# Patient Record
Sex: FEMALE | Race: WHITE | NOT HISPANIC OR LATINO | Employment: FULL TIME | URBAN - METROPOLITAN AREA
[De-identification: names, ages, dates, MRNs, and addresses within clinical notes are randomized per-mention and may not be internally consistent; named-entity substitution may affect disease eponyms.]

---

## 2020-10-05 ENCOUNTER — DOCUMENTATION ONLY (OUTPATIENT)
Dept: GYNECOLOGIC ONCOLOGY | Facility: CLINIC | Age: 55
End: 2020-10-05

## 2020-10-05 NOTE — PROGRESS NOTES
"55 yr old para referred from Dr. Stearns at [facility] for a recent diagnosis of at least a IIIb rectal carcinoma and subsequent finding of a rectovaginal mass with VB that has resolved.    Per Dr. Stearns's note of 10/2/2020, "patient has been having occasional constipation and associated rectal pain/fullness over the last few years. Screening colonoscopy 8/28/2020 revealed a single sessile 1.4 cm nonbleeding polyp in the mid rectum at 8 cm from the anus. There was an infiltrative, ulcerated and fungating mass at the distal rectum, measuring 3 cm, causing partial obstruction. Polyp showed tubular adenoma with focal HG dysplasia. Distal rectal mass biopsy c/w adenocarcinoma with ulceration".     MMR normal/proficient.    "Endorses mild vaginal spotting about 2 weeks ago which progressed to vaginal bleeding x 4-5 days. Not post coital and she is ."    CEA is 31.3    9/15/2020 CT C/A/P  4mm R lung nodule and additional region of somewhat nodular atelectasis in the LLL up to 5mm.  2 cm ill-defined region in posterior R lobe of liver present on prior CT imaging 2017. 3.5 cm regional fatty sparing in posteroinferior most aspect of the R lobe of liver. No significant lymph node enlargement in upper abdomen. Few stable lymph nodes in central mesentery unchanged from 2017.  Impression, no CT evidence for metastatic disease.    9/29/2020 MRI pelvis  Low stenotic, partially circumferential rectal carcinoma measuring 3.6 cm in length with max thickness 1.7 cm. Distal portion of the tumor from the anal verge measured approx 3.2 cm with partial involvement of the muscularis propria. No evidence of fat plane separation within the posterior wall of the vagina. Largest R presacral node measuring approx 1 x 0.7 cm, another 7 x 5 mm, another 8 x 5 mm.  Uterus 6 x 2.9 x 5.6 cm with 2.1 cm fibroid.  In the posterior wall of the vagina is a mass measuring approx 4.8 x 2.2 x 3.2 cm without connection with the rectal mass. Differential " includes vaginal leiomyoma but cannot rule out vaginal carcinoma or LMS.    She is referred today for evaluation and possible biopsy of rectovaginal mass. Referring provider plans for chemo RT and she has been referred to Rad Onc (Dr. Martino).    Medical comorbidities include DM, HTN, hyperthyroidism and sleep apnea (CPAP). BMI 34    No prior pelvic or abdominal surgeries.    Family history for father - prostate cancer (dec). Mother with breast cancer (44, dec). MGM and PGM also with unknown cancer.

## 2020-10-06 ENCOUNTER — OFFICE VISIT (OUTPATIENT)
Dept: GYNECOLOGIC ONCOLOGY | Facility: CLINIC | Age: 55
End: 2020-10-06
Payer: COMMERCIAL

## 2020-10-06 VITALS
SYSTOLIC BLOOD PRESSURE: 139 MMHG | BODY MASS INDEX: 34.11 KG/M2 | DIASTOLIC BLOOD PRESSURE: 74 MMHG | HEART RATE: 78 BPM | WEIGHT: 205 LBS

## 2020-10-06 DIAGNOSIS — E11.9 TYPE 2 DIABETES MELLITUS WITHOUT COMPLICATION, WITHOUT LONG-TERM CURRENT USE OF INSULIN: ICD-10-CM

## 2020-10-06 DIAGNOSIS — E89.0 POSTOPERATIVE HYPOTHYROIDISM: ICD-10-CM

## 2020-10-06 DIAGNOSIS — N89.8 VAGINAL MASS: ICD-10-CM

## 2020-10-06 DIAGNOSIS — C20 RECTAL CANCER: ICD-10-CM

## 2020-10-06 DIAGNOSIS — E05.90 HYPERTHYROIDISM: ICD-10-CM

## 2020-10-06 PROBLEM — I10 HYPERTENSIVE DISORDER: Status: ACTIVE | Noted: 2018-02-23

## 2020-10-06 PROCEDURE — 99999 PR PBB SHADOW E&M-EST. PATIENT-LVL III: ICD-10-PCS | Mod: PBBFAC,,, | Performed by: OBSTETRICS & GYNECOLOGY

## 2020-10-06 PROCEDURE — 3008F BODY MASS INDEX DOCD: CPT | Mod: CPTII,S$GLB,, | Performed by: OBSTETRICS & GYNECOLOGY

## 2020-10-06 PROCEDURE — 99999 PR PBB SHADOW E&M-EST. PATIENT-LVL III: CPT | Mod: PBBFAC,,, | Performed by: OBSTETRICS & GYNECOLOGY

## 2020-10-06 PROCEDURE — 3008F PR BODY MASS INDEX (BMI) DOCUMENTED: ICD-10-PCS | Mod: CPTII,S$GLB,, | Performed by: OBSTETRICS & GYNECOLOGY

## 2020-10-06 PROCEDURE — 99205 PR OFFICE/OUTPT VISIT, NEW, LEVL V, 60-74 MIN: ICD-10-PCS | Mod: S$GLB,,, | Performed by: OBSTETRICS & GYNECOLOGY

## 2020-10-06 PROCEDURE — 99205 OFFICE O/P NEW HI 60 MIN: CPT | Mod: S$GLB,,, | Performed by: OBSTETRICS & GYNECOLOGY

## 2020-10-06 NOTE — LETTER
October 6, 2020      Cayetano Stearns MD  [address]  [place] [address]           [facility] 2nd [facility]  [address]  [place] [address]  Phone: [phone]          Patient: Meron Lamar   MR Number: [id number]   YOB: 1965   Date of Visit: 10/6/2020       Dear Dr. Cayetano Stearns:    Thank you for referring Meron Lamar to me for evaluation. Attached you will find relevant portions of my assessment and plan of care.    If you have questions, please do not hesitate to call me. I look forward to following Meron Lamar along with you.    Sincerely,    Abimael Nelson MD    Enclosure  CC:  No Recipients    If you would like to receive this communication electronically, please contact [email or web address] or [phone] to request more information on [employer] Link access.    For providers and/or their staff who would like to refer a patient to [facility], please contact us through our one-stop-shop provider referral line, [facility], at [phone].    If you feel you have received this communication in error or would no longer like to receive these types of communications, please e-mail [email or web address]

## 2020-10-06 NOTE — PROGRESS NOTES
"Subjective:       Patient ID: Meron Lamar is a 55 y.o. female.    Chief Complaint: vaginal mass (Referred by Daryn)    HPI     55 yr old para referred from Dr. Cayetano Stearns at [facility] for a recent diagnosis of at least a IIIb rectal carcinoma and subsequent finding of a rectovaginal mass with VB that has resolved.    Seen by her Ob-Gyn Dr. Javan Moore in July 24, 2020 for [employer] and exam was painful and she had vaginal discharge.   Pap showed atypical endometrial cells. Was to have a hysteroscopic D&C.     Then colonoscopy was done.      Per Dr. Stearns's note of 10/2/2020, "patient has been having occasional constipation and associated rectal pain/fullness over the last few years. Screening colonoscopy 8/28/2020 revealed a single sessile 1.4 cm nonbleeding polyp in the mid rectum at 8 cm from the anus. There was an infiltrative, ulcerated and fungating mass at the distal rectum, measuring 3 cm, causing partial obstruction. Polyp showed tubular adenoma with focal HG dysplasia. Distal rectal mass biopsy c/w adenocarcinoma with ulceration".      MMR normal/proficient.     "Endorses mild vaginal spotting about 2 weeks ago which progressed to vaginal bleeding x 4-5 days. Not post coital and she is ."     CEA is 31.3     9/15/2020 CT C/A/P  4mm R lung nodule and additional region of somewhat nodular atelectasis in the LLL up to 5mm.  2 cm ill-defined region in posterior R lobe of liver present on prior CT imaging 2017. 3.5 cm regional fatty sparing in posteroinferior most aspect of the R lobe of liver. No significant lymph node enlargement in upper abdomen. Few stable lymph nodes in central mesentery unchanged from 2017.  Impression, no CT evidence for metastatic disease.     9/29/2020 MRI pelvis  Low stenotic, partially circumferential rectal carcinoma measuring 3.6 cm in length with max thickness 1.7 cm. Distal portion of the tumor from the anal verge measured approx 3.2 cm with partial involvement of the muscularis " propria. No evidence of fat plane separation within the posterior wall of the vagina. Largest R presacral node measuring approx 1 x 0.7 cm, another 7 x 5 mm, another 8 x 5 mm.  Uterus 6 x 2.9 x 5.6 cm with 2.1 cm fibroid.  In the posterior wall of the vagina is a mass measuring approx 4.8 x 2.2 x 3.2 cm without connection with the rectal mass. Differential includes vaginal leiomyoma but cannot rule out vaginal carcinoma or LMS.     She is referred today for evaluation and possible biopsy of rectovaginal mass. Referring provider plans for chemo RT and she has been referred to Rad Onc (Dr. Matrino).     Medical comorbidities include DM, HTN, hyperthyroidism and sleep apnea (CPAP). BMI 34     No prior pelvic or abdominal surgeries.     Family history for father - prostate cancer (dec). Mother with breast cancer (44, dec). MGM and PGM also with unknown cancer.    Nulliparous. Not sexually active at present.      Review of Systems   Constitutional: Positive for appetite change (decreased). Negative for chills, fatigue and fever.   Eyes: Negative for visual disturbance.   Respiratory: Negative for cough, shortness of breath and wheezing.    Cardiovascular: Negative for chest pain, palpitations and leg swelling.   Gastrointestinal: Positive for abdominal pain and constipation. Negative for abdominal distention, diarrhea, nausea and vomiting.   Genitourinary: Negative for difficulty urinating, dysuria, frequency, genital sores, hematuria, pelvic pain, urgency, vaginal bleeding, vaginal discharge and vaginal pain.   Musculoskeletal: Positive for back pain. Negative for gait problem and neck stiffness.   Skin: Negative for rash.   Neurological: Negative for seizures and weakness.   Hematological: Negative for adenopathy. Does not bruise/bleed easily.   Psychiatric/Behavioral: The patient is nervous/anxious.        Objective:   /74   Pulse 78   Wt 93 kg (205 lb)   BMI 34.11 kg/m²      Physical Exam  Constitutional:        Appearance: Normal appearance.   HENT:      Head: Normocephalic.   Eyes:      Conjunctiva/sclera: Conjunctivae normal.   Cardiovascular:      Rate and Rhythm: Normal rate and regular rhythm.   Pulmonary:      Effort: Pulmonary effort is normal.      Breath sounds: Normal breath sounds.   Abdominal:      General: Abdomen is flat. There is no distension.      Palpations: Abdomen is soft. There is no mass.      Tenderness: There is no abdominal tenderness. There is no guarding or rebound.      Hernia: No hernia is present.   Genitourinary:     Comments: Bimanual exam:  Vulva: no lesions. Normal appearance  Urethra: Normal size and location. No lesions  Bladder: No masses or tenderness.  Vagina: friable, very vascular mass at intoitus. Unable to insert speculum. Bright red bleeding which stopped.   Cervix: unable to visualize  Uterus: unable to palpate. Unable to perform BM/RV exam  Adnexa: no masses.  Rectovaginal: Unable to perform. Tight anus.       Skin:     General: Skin is warm and dry.   Neurological:      Mental Status: She is alert and oriented to person, place, and time.   Psychiatric:         Attention and Perception: Attention normal.         Mood and Affect: Affect is flat.         Speech: Speech normal.         Behavior: Behavior normal.         Thought Content: Thought content normal.         Assessment:       1. Rectal cancer    2. Vaginal mass    3. Type 2 diabetes mellitus without complication, without long-term current use of insulin    4. Hyperthyroidism    5. Postoperative hypothyroidism        Plan:   Rectal cancer    Vaginal mass    Type 2 diabetes mellitus without complication, without long-term current use of insulin    Hyperthyroidism    Postoperative hypothyroidism      I told the patient that this is a very vascular friable mass.  I suspect that it is metastatic from her rectal cancer.  However, I told her that the only way I can be certain will be within exam under anesthesia and a  biopsy.    I suspect that the atypical endometrial cells were actually from a posterior vaginal wall mass that was un appreciated at the time of her exam.  The patient has a very narrow introitus and requires a narrow speculum with her exams.  I suspect that this is why an office endometrial biopsy was not performed and the plan was for the patient to go to the operating room for a hysteroscopic D and C for atypical endometrial cells.    Consents were signed.    I have also spoken with Dr. Mele Jacobson [facility], who has also spoken with the referring physician Dr. Cayetano Stearns.  Our plans will be for an exam under anesthesia with biopsies of the vaginal mass as well as biopsies of the rectal mass.    This will be coordinated with Dr. Jacobson in his OR.    I also called and spoke with Dr. Cayetano Stearns and explained to him that I suspect that this vaginal mass is metastatic from her colon cancer.      Total encounter time of 60 minutes.  45 minutes of face to face time spent in examining and counseling the patient and answering questions. 15 minutes spent in review of records prior to seeing patient.

## 2020-10-07 ENCOUNTER — PATIENT MESSAGE (OUTPATIENT)
Dept: SURGERY | Facility: CLINIC | Age: 55
End: 2020-10-07

## 2020-10-07 DIAGNOSIS — N89.8 VAGINAL MASS: ICD-10-CM

## 2020-10-07 DIAGNOSIS — Z01.818 PRE-OP EVALUATION: Primary | ICD-10-CM

## 2020-10-07 DIAGNOSIS — C20 RECTAL CANCER: Primary | ICD-10-CM

## 2020-10-08 ENCOUNTER — TELEPHONE (OUTPATIENT)
Dept: SURGERY | Facility: CLINIC | Age: 55
End: 2020-10-08

## 2020-10-09 ENCOUNTER — TELEPHONE (OUTPATIENT)
Dept: SURGERY | Facility: CLINIC | Age: 55
End: 2020-10-09

## 2020-10-09 ENCOUNTER — OFFICE VISIT (OUTPATIENT)
Dept: SURGERY | Facility: CLINIC | Age: 55
End: 2020-10-09
Payer: COMMERCIAL

## 2020-10-09 ENCOUNTER — LAB VISIT (OUTPATIENT)
Dept: LAB | Facility: HOSPITAL | Age: 55
End: 2020-10-09
Attending: COLON & RECTAL SURGERY
Payer: COMMERCIAL

## 2020-10-09 ENCOUNTER — LAB VISIT (OUTPATIENT)
Dept: SURGERY | Facility: CLINIC | Age: 55
End: 2020-10-09
Payer: COMMERCIAL

## 2020-10-09 DIAGNOSIS — C20 RECTAL CANCER: ICD-10-CM

## 2020-10-09 DIAGNOSIS — C20 RECTAL CANCER: Primary | ICD-10-CM

## 2020-10-09 DIAGNOSIS — Z01.818 PRE-OP EVALUATION: ICD-10-CM

## 2020-10-09 LAB
CEA SERPL-MCNC: 32.9 NG/ML (ref 0–5)
SARS-COV-2 RNA RESP QL NAA+PROBE: NOT DETECTED

## 2020-10-09 PROCEDURE — 99999 PR PBB SHADOW E&M-EST. PATIENT-LVL I: CPT | Mod: PBBFAC,,, | Performed by: COLON & RECTAL SURGERY

## 2020-10-09 PROCEDURE — 36415 COLL VENOUS BLD VENIPUNCTURE: CPT

## 2020-10-09 PROCEDURE — 99205 OFFICE O/P NEW HI 60 MIN: CPT | Mod: S$GLB,,, | Performed by: COLON & RECTAL SURGERY

## 2020-10-09 PROCEDURE — 99999 PR PBB SHADOW E&M-EST. PATIENT-LVL I: ICD-10-PCS | Mod: PBBFAC,,, | Performed by: COLON & RECTAL SURGERY

## 2020-10-09 PROCEDURE — 99205 PR OFFICE/OUTPT VISIT, NEW, LEVL V, 60-74 MIN: ICD-10-PCS | Mod: S$GLB,,, | Performed by: COLON & RECTAL SURGERY

## 2020-10-09 PROCEDURE — U0003 INFECTIOUS AGENT DETECTION BY NUCLEIC ACID (DNA OR RNA); SEVERE ACUTE RESPIRATORY SYNDROME CORONAVIRUS 2 (SARS-COV-2) (CORONAVIRUS DISEASE [COVID-19]), AMPLIFIED PROBE TECHNIQUE, MAKING USE OF HIGH THROUGHPUT TECHNOLOGIES AS DESCRIBED BY CMS-2020-01-R: HCPCS

## 2020-10-09 PROCEDURE — 82378 CARCINOEMBRYONIC ANTIGEN: CPT

## 2020-10-09 RX ORDER — ACETAMINOPHEN 325 MG/1
325 TABLET ORAL EVERY 6 HOURS PRN
COMMUNITY
End: 2022-09-30

## 2020-10-09 NOTE — H&P (VIEW-ONLY)
"CRS Office Visit History and Physical    Referring Md:   Aaareferral Self  No address on file    SUBJECTIVE:     Chief Complaint: rectal cancer    History of Present Illness:  The patient is new patient to this practice.   Course is as follows:  Patient is a 55 y.o. female presents with locally advanced rectal cancer.     8/28/20:  Colonoscopy revealed a single sessile 1.4 cm nonbleeding polyp in the mid rectum at 8 cm from the anus. There was an infiltrative, ulcerated and fungating mass at the distal rectum, measuring 3 cm, causing partial obstruction. This was her first colonoscopy  Pathology: Polyp showed tubular adenoma with focal HG dysplasia. Distal rectal mass biopsy c/w adenocarcinoma with ulceration"   - MMR normal/proficient.     Staging:  - CEA is 31.3  - 9/15/2020 CT C/A/P  4mm R lung nodule and additional region of somewhat nodular atelectasis in the LLL up to 5mm.  2 cm ill-defined region in posterior R lobe of liver present on prior CT imaging 2017. 3.5 cm regional fatty sparing in posteroinferior most aspect of the R lobe of liver. No significant lymph node enlargement in upper abdomen. Few stable lymph nodes in central mesentery unchanged from 2017.  Impression, no CT evidence for metastatic disease.   - 9/29/2020 MRI pelvis  Low stenotic, partially circumferential rectal carcinoma measuring 3.6 cm in length with max thickness 1.7 cm. Distal portion of the tumor from the anal verge measured approx 3.2 cm with partial involvement of the muscularis propria. No evidence of fat plane separation within the posterior wall of the vagina. Largest R presacral node measuring approx 1 x 0.7 cm, another 7 x 5 mm, another 8 x 5 mm.  Uterus 6 x 2.9 x 5.6 cm with 2.1 cm fibroid.  In the posterior wall of the vagina is a mass measuring approx 4.8 x 2.2 x 3.2 cm without connection with the rectal mass. Differential includes vaginal leiomyoma but cannot rule out vaginal carcinoma or LMS.      Medical comorbidities " include DM, HTN, hyperthyroidism and sleep apnea (CPAP). BMI 34     No prior abdominal surgeries.  She has had a prior anal fistula versus fissure surgery 20+ years ago.  At that time, she had a flexible sigmoidoscopy that was normal.    Functionally, she is active and performs all of her activities of daily living.  Nonsmoker.  No significant weight loss.  Historically, she had 3 bowel movements per day.  No fecal incontinence.     Family history for father - prostate cancer (dec). Mother with breast cancer (44, dec). MGM and PGM also with unknown cancer.      Review of patient's allergies indicates:  No Known Allergies    Past Medical History:   Diagnosis Date    Colon cancer Oc t 2020    rectal     Depression     Diabetes mellitus     pre diabetic    Hypertension     Postoperative hypothyroidism 10/6/2020    Rectal cancer 10/6/2020    Rectal cancer     Renal disorder     kidney stone    Sleep apnea     c pap machine in use    Thyroid disease      Past Surgical History:   Procedure Laterality Date    ESOPHAGOGASTRODUODENOSCOPY      FISTULA REPAIR      anal fistula    FUSION OF CERVICAL SPINE BY POSTERIOR APPROACH      anterior and posterior     Family History   Problem Relation Age of Onset    Breast cancer Mother 44    Cancer Mother     Heart disease Father     Hyperlipidemia Father     Prostate cancer Father     Breast cancer Maternal Grandmother     Cancer Paternal Grandmother         ? female origin    Ovarian cancer Neg Hx     Uterine cancer Neg Hx     Colon cancer Neg Hx      Social History     Tobacco Use    Smoking status: Never Smoker    Smokeless tobacco: Never Used   Substance Use Topics    Alcohol use: Never     Frequency: Never    Drug use: Never        Review of Systems:  Review of Systems   Constitutional: Negative for chills, diaphoresis, fever, malaise/fatigue and weight loss.   HENT: Negative for congestion.    Respiratory: Negative for shortness of breath.     Cardiovascular: Negative for chest pain and leg swelling.   Gastrointestinal: Positive for blood in stool and constipation. Negative for abdominal pain, nausea and vomiting.   Genitourinary: Negative for dysuria.   Musculoskeletal: Negative for back pain and myalgias.   Skin: Negative for rash.   Neurological: Negative for dizziness and weakness.   Endo/Heme/Allergies: Does not bruise/bleed easily.   Psychiatric/Behavioral: Negative for depression.       OBJECTIVE:     Vital Signs (Most Recent)  There were no vitals taken for this visit.    Physical Exam:  General: White female in no distress   Neuro: alert and oriented x 4.  Moves all extremities.     HEENT: no icterus.  Trachea midline  Respiratory: respirations are even and unlabored  Cardiac: regular rate  Abdomen:  Soft, nontender, no masses  Extremities: Warm dry and intact  Skin: no rashes  Anorectal:  Deferred    Labs: H&H 11 and 33.  Alb 4.5.  Normal renal function    Imaging: above.  Images are being loaded.      ASSESSMENT/PLAN:     Diagnoses and all orders for this visit:    Rectal cancer        55-year-old woman with locally advanced rectal cancer.  Unable to see the MRI today.  Per the report, there is a mass involving the rectovaginal septum that is questionably separate from the rectal mass.  Long discussion today regarding rectal cancer.  Discussed that there are multiple modalities of treatment for rectal cancer including chemotherapy, radiation, and surgery.  We will plan for exam under anesthesia on Monday for further evaluation.  She can be in lithotomy position under mac anesthesia.  Biopsies can be taken of the vaginal mass to confirm that this is related to her underlying rectal cancer.  With locally invasive rectal cancer, she would be an ideal candidate for total neoadjuvant therapy with long course chemo radiotherapy followed by systemic chemotherapy.  We discussed that there is a chance clinical complete response with avoidance of  surgery.  We also discussed the total neoadjuvant therapy would likely improve her odds of receiving all of her treatment.  In regards to her surgery, we discussed that she may require abdominal perineal resection with posterior vaginectomy.  However, surgical planning will be altered based on her exam under anesthesia on Monday in viewing her MRI.  Once those images are received, we can plan to present her in multidisciplinary tumor conference as well.    RAHUL Jacobson MD, FACS, FASCRS  Staff Surgeon  Colon & Rectal Surgery

## 2020-10-11 ENCOUNTER — ANESTHESIA EVENT (OUTPATIENT)
Dept: SURGERY | Facility: HOSPITAL | Age: 55
End: 2020-10-11
Payer: COMMERCIAL

## 2020-10-11 NOTE — ANESTHESIA PREPROCEDURE EVALUATION
[facility]  Anesthesia Pre-Operative Evaluation         Patient Name: Meron Lamar  YOB: 1965  MRN: [id number]    SUBJECTIVE:     Pre-operative evaluation for Procedure(s) (LRB):  Exam under anesthesia, flex sig, lithotomy, MAC ok (N/A)     10/11/2020    Meron Lamar is a 55 y.o. female w/ a significant PMHx of rectal cancer, type 2 diabetes not on insulin, htn.     Patient now presents for the above procedure(s).      LDA: None documented.       Prev airway: None documented.    Drips: None documented.      Patient Active Problem List   Diagnosis    Rectal cancer    Vaginal mass    Type 2 diabetes mellitus without complication, without long-term current use of insulin    Hypertensive disorder    Postoperative hypothyroidism       Review of patient's allergies indicates:  No Known Allergies    Current Inpatient Medications:      No current facility-administered medications on file prior to encounter.      Current Outpatient Medications on File Prior to Encounter   Medication Sig Dispense Refill    acetaminophen (TYLENOL) 325 MG tablet Take 325 mg by mouth every 6 (six) hours as needed for Pain.      diphenhydrAMINE-acetaminophen (TYLENOL PM)  mg Tab Take 1 tablet by mouth nightly.      ibuprofen (ADVIL,MOTRIN) 200 MG tablet Take 200 mg by mouth every 6 (six) hours as needed for Pain.      levothyroxine (SYNTHROID) 25 MCG tablet Take 25 mcg by mouth before breakfast.      melatonin 5 mg TbIE Take 1 tablet by mouth every evening.      metFORMIN (GLUCOPHAGE) 500 MG tablet Take 500 mg by mouth 2 (two) times daily with meals.      metoprolol succinate (TOPROL-XL) 100 MG 24 hr tablet Take 100 mg by mouth once daily.      telmisartan-hydrochlorothiazide (MICARDIS HCT) 80-25 mg per tablet Take 1 tablet by mouth once daily.         Past Surgical History:   Procedure Laterality Date    ESOPHAGOGASTRODUODENOSCOPY      FISTULA REPAIR      anal fistula    FUSION OF CERVICAL  SPINE BY POSTERIOR APPROACH      anterior and posterior       Social History     Socioeconomic History    Marital status: Single     Spouse name: Not on file    Number of children: Not on file    Years of education: Not on file    Highest education level: Not on file   Occupational History    Not on file   Social Needs    Financial resource strain: Not on file    Food insecurity     Worry: Not on file     Inability: Not on file    Transportation needs     Medical: Not on file     Non-medical: Not on file   Tobacco Use    Smoking status: Never Smoker    Smokeless tobacco: Never Used   Substance and Sexual Activity    Alcohol use: Never     Frequency: Never    Drug use: Never    Sexual activity: Not Currently   Lifestyle    Physical activity     Days per week: Not on file     Minutes per session: Not on file    Stress: Not on file   Relationships    Social connections     Talks on phone: Not on file     Gets together: Not on file     Attends Church service: Not on file     Active member of club or organization: Not on file     Attends meetings of clubs or organizations: Not on file     Relationship status: Not on file   Other Topics Concern    Not on file   Social History Narrative    Not on file       OBJECTIVE:     Vital Signs Range (Last 24H):         Significant Labs:  Lab Results   Component Value Date    WBC 9.10 08/26/2020    HGB 10.6 (L) 08/26/2020    HCT 33.3 (L) 08/26/2020     (H) 08/26/2020    ALT 29 08/26/2020    AST 39 (H) 08/26/2020     08/26/2020    K 4.0 08/26/2020     08/26/2020    CREATININE 0.70 08/26/2020    BUN 20 (H) 08/26/2020    CO2 28 08/26/2020       Diagnostic Studies: No relevant studies.    EKG:   Results for orders placed or performed during the hospital encounter of 08/26/20   EKG 12-lead    Collection Time: 08/26/20 12:40 PM    Narrative    Test Reason : Z01.818,I10, PREOP    Vent. Rate : 065 BPM     Atrial Rate : 065 BPM     P-R Int : 144 ms           QRS Dur : 086 ms      QT Int : 430 ms       P-R-T Axes : 043 058 056 degrees     QTc Int : 447 ms    Normal sinus rhythm  Normal ECG  No previous ECGs available  Confirmed by Adan Devi MD ([id number]) on 8/26/2020 1:59:40 PM    Referred By: ESHA HENRY           Confirmed By:Adan Devi MD       2D ECHO:  TTE:  No results found for this or any previous visit.    ELBA:  No results found for this or any previous visit.    ASSESSMENT/PLAN:         Anesthesia Evaluation    I have reviewed the Patient Summary Reports.    I have reviewed the Nursing Notes.    I have reviewed the Medications.     Review of Systems  Anesthesia Hx:  No problems with previous Anesthesia  History of prior surgery of interest to airway management or planning: Denies Family Hx of Anesthesia complications.   Denies Personal Hx of Anesthesia complications.   Social:  Non-Smoker, No Alcohol Use    Hematology/Oncology:        Current/Recent Cancer.   Cardiovascular:   Exercise tolerance: good Hypertension ECG has been reviewed. 8/2020 EKG NSR Hypertension, Essential Hypertension , Pt in optimal range, systolic < 120 and diastolic < 80, Well Controlled on Rx , Recent typical clinic B/P of 113/61    Pulmonary:   Sleep Apnea, CPAP    Renal/:   renal calculi    Hepatic/GI:  Hepatic/GI Normal    Neurological:  Neurology Normal    Endocrine:   Diabetes, well controlled, type 2  Diabetes, Type 2 Diabetes for 1 years , controlled by diet, oral hypoglycemics.        Physical Exam  General:  Obesity    Airway/Jaw/Neck:  Airway Findings: Mouth Opening: Small, but > 3cm Tongue: Normal  General Airway Assessment: Adult  Mallampati: III  Improves to II with phonation.  TM Distance: Normal, at least 6 cm  Jaw/Neck Findings:  Neck ROM: Normal ROM      Dental:  Dental Findings: In tact   Chest/Lungs:  Chest/Lungs Findings: Clear to auscultation, Normal Respiratory Rate     Heart/Vascular:  Heart Findings: Rate: Normal  Rhythm: Regular Rhythm        Mental  Status:  Mental Status Findings:  Cooperative, Alert and Oriented         Anesthesia Plan  Type of Anesthesia, risks & benefits discussed:  Anesthesia Type:  general, MAC  Patient's Preference:   Intra-op Monitoring Plan: standard ASA monitors  Intra-op Monitoring Plan Comments:   Post Op Pain Control Plan: multimodal analgesia, IV/PO Opioids PRN and per primary service following discharge from PACU  Post Op Pain Control Plan Comments:   Induction:   IV  Beta Blocker:  Patient is on a Beta-Blocker and has received one dose within the past 24 hours (No further documentation required).       Informed Consent: Patient understands risks and agrees with Anesthesia plan.  Questions answered. Anesthesia consent signed with patient.  ASA Score: 3     Day of Surgery Review of History & Physical: I have interviewed and examined the patient. I have reviewed the patient's H&P dated:            Ready For Surgery From Anesthesia Perspective.

## 2020-10-12 ENCOUNTER — ANESTHESIA (OUTPATIENT)
Dept: SURGERY | Facility: HOSPITAL | Age: 55
End: 2020-10-12
Payer: COMMERCIAL

## 2020-10-12 ENCOUNTER — HOSPITAL ENCOUNTER (OUTPATIENT)
Facility: HOSPITAL | Age: 55
Discharge: HOME | End: 2020-10-12
Attending: COLON & RECTAL SURGERY | Admitting: COLON & RECTAL SURGERY
Payer: COMMERCIAL

## 2020-10-12 VITALS
RESPIRATION RATE: 15 BRPM | HEIGHT: 65 IN | HEART RATE: 65 BPM | DIASTOLIC BLOOD PRESSURE: 65 MMHG | SYSTOLIC BLOOD PRESSURE: 124 MMHG | TEMPERATURE: 97 F | OXYGEN SATURATION: 96 % | BODY MASS INDEX: 33.82 KG/M2 | WEIGHT: 203 LBS

## 2020-10-12 DIAGNOSIS — C20 RECTAL CANCER: Primary | ICD-10-CM

## 2020-10-12 LAB
POCT GLUCOSE: 124 MG/DL (ref 70–110)
POCT GLUCOSE: 133 MG/DL (ref 70–110)

## 2020-10-12 PROCEDURE — 45330 DIAGNOSTIC SIGMOIDOSCOPY: CPT | Mod: ,,, | Performed by: COLON & RECTAL SURGERY

## 2020-10-12 PROCEDURE — 37000009 HC ANESTHESIA EA ADD 15 MINS: Performed by: COLON & RECTAL SURGERY

## 2020-10-12 PROCEDURE — 71000015 HC POSTOP RECOV 1ST HR: Performed by: COLON & RECTAL SURGERY

## 2020-10-12 PROCEDURE — 57410 PELVIC EXAMINATION: CPT | Mod: ,,, | Performed by: OBSTETRICS & GYNECOLOGY

## 2020-10-12 PROCEDURE — 25000003 PHARM REV CODE 250: Performed by: STUDENT IN AN ORGANIZED HEALTH CARE EDUCATION/TRAINING PROGRAM

## 2020-10-12 PROCEDURE — 45330 PR SIGMOIDOSCOPY,DIAG2STIC: ICD-10-PCS | Mod: ,,, | Performed by: COLON & RECTAL SURGERY

## 2020-10-12 PROCEDURE — D9220A PRA ANESTHESIA: Mod: ,,, | Performed by: ANESTHESIOLOGY

## 2020-10-12 PROCEDURE — 57410 PR PELVIC EXAMINATION W ANESTH: ICD-10-PCS | Mod: ,,, | Performed by: OBSTETRICS & GYNECOLOGY

## 2020-10-12 PROCEDURE — 82962 GLUCOSE BLOOD TEST: CPT | Performed by: COLON & RECTAL SURGERY

## 2020-10-12 PROCEDURE — 63600175 PHARM REV CODE 636 W HCPCS: Performed by: STUDENT IN AN ORGANIZED HEALTH CARE EDUCATION/TRAINING PROGRAM

## 2020-10-12 PROCEDURE — 25000003 PHARM REV CODE 250: Performed by: COLON & RECTAL SURGERY

## 2020-10-12 PROCEDURE — 36000705 HC OR TIME LEV I EA ADD 15 MIN: Performed by: COLON & RECTAL SURGERY

## 2020-10-12 PROCEDURE — 27201423 OPTIME MED/SURG SUP & DEVICES STERILE SUPPLY: Performed by: COLON & RECTAL SURGERY

## 2020-10-12 PROCEDURE — 37000008 HC ANESTHESIA 1ST 15 MINUTES: Performed by: COLON & RECTAL SURGERY

## 2020-10-12 PROCEDURE — 36000704 HC OR TIME LEV I 1ST 15 MIN: Performed by: COLON & RECTAL SURGERY

## 2020-10-12 PROCEDURE — 71000044 HC DOSC ROUTINE RECOVERY FIRST HOUR: Performed by: COLON & RECTAL SURGERY

## 2020-10-12 PROCEDURE — D9220A PRA ANESTHESIA: ICD-10-PCS | Mod: ,,, | Performed by: ANESTHESIOLOGY

## 2020-10-12 RX ORDER — KETAMINE HCL IN 0.9 % NACL 50 MG/5 ML
SYRINGE (ML) INTRAVENOUS
Status: DISCONTINUED | OUTPATIENT
Start: 2020-10-12 | End: 2020-10-12

## 2020-10-12 RX ORDER — SODIUM CHLORIDE 0.9 % (FLUSH) 0.9 %
10 SYRINGE (ML) INJECTION
Status: DISCONTINUED | OUTPATIENT
Start: 2020-10-12 | End: 2020-10-12 | Stop reason: HOSPADM

## 2020-10-12 RX ORDER — ACETAMINOPHEN 500 MG
1000 TABLET ORAL
Status: COMPLETED | OUTPATIENT
Start: 2020-10-12 | End: 2020-10-12

## 2020-10-12 RX ORDER — PROPOFOL 10 MG/ML
VIAL (ML) INTRAVENOUS
Status: DISCONTINUED | OUTPATIENT
Start: 2020-10-12 | End: 2020-10-12

## 2020-10-12 RX ORDER — FENTANYL CITRATE 50 UG/ML
25 INJECTION, SOLUTION INTRAMUSCULAR; INTRAVENOUS EVERY 5 MIN PRN
Status: DISCONTINUED | OUTPATIENT
Start: 2020-10-12 | End: 2020-10-12 | Stop reason: HOSPADM

## 2020-10-12 RX ORDER — SODIUM CHLORIDE 9 MG/ML
INJECTION, SOLUTION INTRAVENOUS CONTINUOUS
Status: DISCONTINUED | OUTPATIENT
Start: 2020-10-12 | End: 2020-10-12 | Stop reason: HOSPADM

## 2020-10-12 RX ORDER — HYDROCODONE BITARTRATE AND ACETAMINOPHEN 5; 325 MG/1; MG/1
1 TABLET ORAL EVERY 4 HOURS PRN
Status: DISCONTINUED | OUTPATIENT
Start: 2020-10-12 | End: 2020-10-12 | Stop reason: HOSPADM

## 2020-10-12 RX ORDER — MIDAZOLAM HYDROCHLORIDE 1 MG/ML
INJECTION, SOLUTION INTRAMUSCULAR; INTRAVENOUS
Status: DISCONTINUED | OUTPATIENT
Start: 2020-10-12 | End: 2020-10-12

## 2020-10-12 RX ORDER — HYDROCODONE BITARTRATE AND ACETAMINOPHEN 5; 325 MG/1; MG/1
1 TABLET ORAL EVERY 6 HOURS PRN
Qty: 20 TABLET | Refills: 0 | Status: ON HOLD | OUTPATIENT
Start: 2020-10-12 | End: 2021-04-09 | Stop reason: HOSPADM

## 2020-10-12 RX ORDER — ONDANSETRON 2 MG/ML
INJECTION INTRAMUSCULAR; INTRAVENOUS
Status: DISCONTINUED | OUTPATIENT
Start: 2020-10-12 | End: 2020-10-12

## 2020-10-12 RX ORDER — FENTANYL CITRATE 50 UG/ML
INJECTION, SOLUTION INTRAMUSCULAR; INTRAVENOUS
Status: DISCONTINUED | OUTPATIENT
Start: 2020-10-12 | End: 2020-10-12

## 2020-10-12 RX ORDER — OXYCODONE HYDROCHLORIDE 5 MG/1
10 TABLET ORAL EVERY 4 HOURS PRN
Status: DISCONTINUED | OUTPATIENT
Start: 2020-10-12 | End: 2020-10-12 | Stop reason: HOSPADM

## 2020-10-12 RX ORDER — PROPOFOL 10 MG/ML
VIAL (ML) INTRAVENOUS CONTINUOUS PRN
Status: DISCONTINUED | OUTPATIENT
Start: 2020-10-12 | End: 2020-10-12

## 2020-10-12 RX ORDER — LIDOCAINE HYDROCHLORIDE 10 MG/ML
1 INJECTION, SOLUTION EPIDURAL; INFILTRATION; INTRACAUDAL; PERINEURAL ONCE
Status: COMPLETED | OUTPATIENT
Start: 2020-10-12 | End: 2020-10-12

## 2020-10-12 RX ADMIN — MIDAZOLAM HYDROCHLORIDE 2 MG: 1 INJECTION, SOLUTION INTRAMUSCULAR; INTRAVENOUS at 07:10

## 2020-10-12 RX ADMIN — ACETAMINOPHEN 1000 MG: 500 TABLET ORAL at 06:10

## 2020-10-12 RX ADMIN — FENTANYL CITRATE 100 MCG: 50 INJECTION, SOLUTION INTRAMUSCULAR; INTRAVENOUS at 07:10

## 2020-10-12 RX ADMIN — ONDANSETRON 4 MG: 2 INJECTION, SOLUTION INTRAMUSCULAR; INTRAVENOUS at 07:10

## 2020-10-12 RX ADMIN — FENTANYL CITRATE 25 MCG: 50 INJECTION, SOLUTION INTRAMUSCULAR; INTRAVENOUS at 07:10

## 2020-10-12 RX ADMIN — Medication 47 MG: at 07:10

## 2020-10-12 RX ADMIN — FENTANYL CITRATE 25 MCG: 50 INJECTION, SOLUTION INTRAMUSCULAR; INTRAVENOUS at 08:10

## 2020-10-12 RX ADMIN — PROPOFOL 100 MCG/KG/MIN: 10 INJECTION, EMULSION INTRAVENOUS at 07:10

## 2020-10-12 RX ADMIN — HYDROCODONE BITARTRATE AND ACETAMINOPHEN 1 TABLET: 5; 325 TABLET ORAL at 08:10

## 2020-10-12 RX ADMIN — LIDOCAINE HYDROCHLORIDE 0.5 MG: 10 INJECTION, SOLUTION EPIDURAL; INFILTRATION; INTRACAUDAL at 06:10

## 2020-10-12 RX ADMIN — FENTANYL CITRATE 25 MCG: 50 INJECTION INTRAMUSCULAR; INTRAVENOUS at 08:10

## 2020-10-12 RX ADMIN — PROPOFOL 30 MG: 10 INJECTION, EMULSION INTRAVENOUS at 07:10

## 2020-10-12 RX ADMIN — SODIUM CHLORIDE: 0.9 INJECTION, SOLUTION INTRAVENOUS at 06:10

## 2020-10-12 NOTE — PLAN OF CARE
Plan of care reviewed with patient/family. Discharge instruction given; verbalizes understanding. Handouts given. Vital signs stable. Burning made tolerable with PRN medications. Tolerated procedure/ anesthesia well. Consents in chart.

## 2020-10-12 NOTE — DISCHARGE INSTRUCTIONS
Anal Surgery Post Op Instructions:    You had an examination under anesthesia and flexible sigmoidoscopy performed today.  Everything went well.  The vaginal mass is directly related to the rectal cancer, so it is just one issue that is causing everything.  The tumor is large, but it is surgically removable (which is good news).  We will plan for radiation and chemotherapy to shrink the tumor down.  I will see you 6 weeks after radiation has been completed.       Wound care:  Expect some bleeding over the next few days from the exam.  Please wear a pad to help with drainage.     You have no activity restrictions.   No dietary restrictions.    Medications:  You can take tylenol and Ibuprofen form pain as needed.  A narcotic pain medication has been prescribed if needed.      Please take miralax 1 capful at night to prevent constipation associated with narcotic pain medications.      Follow up:  Return to clinic in 6 weeks after radiation.     RAHUL Jacobson MD  Staff Surgeon  Colon & Rectal Surgery

## 2020-10-12 NOTE — BRIEF OP NOTE
[facility]  Brief Operative Note    SUMMARY     Surgery Date: 10/12/2020     Surgeon(s) and Role:     * Blake Jacobson MD - Primary     * Silvio Garnett MD - Fellow     * Abimael Nelson MD - Co-Surgeon    Assisting Surgeon: None    Pre-op Diagnosis:  Rectal cancer [C20]  Vaginal mass [N89.8]    Post-op Diagnosis:  Post-Op Diagnosis Codes:     * Rectal cancer [C20]     * Vaginal mass [N89.8]    Procedure(s) (LRB):  Exam under anesthesia, flex sig (N/A)    Anesthesia: General/MAC    Description of Procedure:  Exam under anesthesia.  Flexible sigmoidoscopy by Dr. Jacobson.    Description of the findings of the procedure:  There is a friable posterior vaginal wall mass.  This measures approximately 7 cm and extends from 3:00 a.m. on the right vaginal wall to approximately 7:00 a.m. on the left vaginal wall.  This mass is visible at the introitus.  It extends superiorly for 7 cm.  There is no visible cervix.  There is no palpable cervix.  There is palpable tumor in the rectum on the anterior rectal wall.  The rectal mass feels fixed to the left pelvic sidewall.    Estimated Blood Loss:  20 mL    Estimated Blood Loss has been documented.         Specimens:   Specimen (12h ago, onward)    None          [id number]

## 2020-10-12 NOTE — ANESTHESIA POSTPROCEDURE EVALUATION
Anesthesia Post Evaluation    Patient: Meron Lamar    Procedure(s) Performed: Procedure(s) (LRB):  Exam under anesthesia, flex sig (N/A)    Final Anesthesia Type: general    Patient location during evaluation: PACU  Patient participation: Yes- Able to Participate  Level of consciousness: awake and alert and oriented  Post-procedure vital signs: reviewed and stable  Pain management: adequate  Airway patency: patent    PONV status at discharge: No PONV  Anesthetic complications: no      Cardiovascular status: blood pressure returned to baseline, hemodynamically stable and stable  Respiratory status: unassisted, room air and spontaneous ventilation  Hydration status: euvolemic  Follow-up not needed.          Vitals Value Taken Time   /61 10/12/20 0846   Temp 36.3 °C (97.3 °F) 10/12/20 0800   Pulse 70 10/12/20 0853   Resp 13 10/12/20 0853   SpO2 99 % 10/12/20 0853   Vitals shown include unvalidated device data.      No case tracking events are documented in the log.      Pain/Shanti Score: Pain Rating Prior to Med Admin: 8 (10/12/2020  8:42 AM)  Shanti Score: 10 (10/12/2020  8:45 AM)

## 2020-10-12 NOTE — TRANSFER OF CARE
"Anesthesia Transfer of Care Note    Patient: Meron Lamar    Procedure(s) Performed: Procedure(s) (LRB):  Exam under anesthesia, flex sig (N/A)    Patient location: PACU    Anesthesia Type: general    Transport from OR: Transported from OR on 6-10 L/min O2 by face mask with adequate spontaneous ventilation    Post pain: adequate analgesia    Post assessment: no apparent anesthetic complications    Post vital signs: stable    Level of consciousness: awake and alert    Nausea/Vomiting: no nausea/vomiting    Complications: none    Transfer of care protocol was followed      Last vitals:   Visit Vitals  BP (P) 136/77 (BP Location: Right arm, Patient Position: Lying)   Pulse (P) 80   Temp (P) 36.3 °C (97.3 °F) (Oral)   Resp (P) 18   Ht 5' 5" (1.651 m)   Wt 92.1 kg (203 lb)   SpO2 (P) 100%   Breastfeeding No   BMI 33.78 kg/m²     "

## 2020-10-12 NOTE — OP NOTE
DARLENE LAMAR  [id number]  [id number]    OPERATIVE NOTE:    DATE OF OPERATION: 10/12/2020    PREOPERATIVE DIAGNOSIS:  Rectal cancer    POSTOPERATIVE DIAGNOSIS:  Locally invasive rectal cancer    PROCEDURE PERFORMED:  Exam under anesthesia with flexible sigmoidoscopy    ATTENDING SURGEON: RAHUL Jacobson MD    CO-ATTENDING SURGEON: Abimael Nelson MD   - the need for co-surgeon was medically necessary secondary to vaginal involvement with a mass in the rectovaginal septum with preoperative planning as GYN Oncology would be necessary for potential vaginectomy    RESIDENT/ FELLOW SURGEON: Mariola    ANESTHESIA: MAC    ESTIMATED BLOOD LOSS:  10 mL    FINDINGS:  1.  Locally invasive rectal cancer.  Digital exam demonstrated tumor in the posterior vaginal wall.  Anteriorly was clear.  Lateral sidewalls felt clear on the anterior aspect of the lateral sidewall.  Digital exam of the anus demonstrated large near circumferential rectal mass mostly located anteriorly and extending onto the patient's left side.  Felt tethered to the left levators.  Mass was continuous with the posterior vaginal mass.  Mass was ulcerated in the central aspect anteriorly.    SPECIMENS:  None    COMPLICATIONS:  None apparent    DISPOSITION: PACU    CONDITION: good    INDICATION FOR PROCEDURE:  Darlene Lamar is a 55 y.o. female who was found to have a mass in the rectovaginal septum secondary to a rectal adenocarcinoma.  MRI was performed.  Report demonstrated 2 distinct masses.  In order to clarify her anatomy, exam under anesthesia was recommended.  She would not tolerate exam in the office.    DESCRIPTION OF PROCEDURE:   After informed consent was reviewed, the patient was taken to the operating room and placed into lithotomy position.  She was placed under mac anesthesia.  A time-out was then performed.    Vaginal exam demonstrated a large mass in the posterior aspect the vagina.  This was contiguous with a rectal mass that was mostly  anteriorly and extended out to the left side.  Findings are as above.  No biopsies were taken as the masses were clearly contiguous.  Cervical os was difficult to decipher.  Following exam under anesthesia, flexible sigmoidoscopy was performed.  The mass was able to be traversed.  The mass measured from 8 cm to the dentate line.  It was mostly anteriorly extending onto the left side.  Nonobstructive.  Contact bleeding.  In regards to future surgery, will plan for her to undergo total neoadjuvant therapy.  I am concerned that with tumor response, she is prone to developed a rectovaginal fistula.  Based on today's exam, there is no contraindication to resection.  The mass appears overall resectable.  This would require a abdominal perineal resection since it is tethered to the levators as well as to the vagina.  She would need a posterior vaginectomy with flap reconstruction.  I will plan to see her 6 weeks after completion of chemo radiotherapy.  At that time, she can be examined in the office.  In the absence of a clinical complete response, I will set her up to be seen by GYN Oncology as well as Plastic surgery for evaluation for reconstruction      RAHUL Jacobson MD, FACS, FASCRS  Staff Surgeon  Colon & Rectal Surgery

## 2020-10-12 NOTE — BRIEF OP NOTE
[facility]  Brief Operative Note    Surgery Date: 10/12/2020     Surgeon(s) and Role:     * Blake Jacobson MD - Primary     * Silvio Garnett MD - Fellow     * Abimael Nelson MD - Co-Surgeon    Assisting Surgeon: None    Pre-op Diagnosis:  Rectal cancer [C20]  Vaginal mass [N89.8]    Post-op Diagnosis:  Post-Op Diagnosis Codes:     * Rectal cancer [C20]     * Vaginal mass [N89.8]    Procedure(s) (LRB):  Exam under anesthesia, flex sig (N/A)    Anesthesia: General/MAC    Description of the findings of the procedure(s): nearly circumferential rectal mass spanning from dentate to 8cm, partially obstructing but could be traversed endoscopically, mass invades into and through anterior wall of vagina.    Estimated Blood Loss: * No values recorded between 10/12/2020  7:33 AM and 10/12/2020  7:56 AM *         Specimens:   Specimen (12h ago, onward)    None            Discharge Note    OUTCOME: Patient tolerated treatment/procedure well without complication and is now ready for discharge.    DISPOSITION: Home or Self Care    FINAL DIAGNOSIS:  Rectal Cancer    FOLLOWUP: In clinic    DISCHARGE INSTRUCTIONS:  No discharge procedures on file.

## 2020-10-12 NOTE — OP NOTE
[facility]  Surgery Department  Operative Note    SUMMARY     Patient: Meron Lamar    Medical Record: [id number]    Date of Procedure: 10/12/2020     Surgeon: Surgeon(s) and Role:     * Blake Jacobson MD - Primary     * Silvio Garnett MD - Fellow     * Abimael Nelson MD - Co-Surgeon    Assisting Surgeon: None    Pre-Operative Diagnosis: Rectal cancer [C20]  Vaginal mass [N89.8]    Post-Operative Diagnosis: Post-Op Diagnosis Codes:     * Rectal cancer [C20]     * Vaginal mass [N89.8]    Procedure: Procedure(s) (LRB):  Exam under anesthesia, flex sig (N/A)    EBL:  20 mL    Total Fluid:  500 mL    Urine Output:  Not catheterized    Drains:  None    Operative History:  Patient referred with a rectal carcinoma.  On her MRI of the pelvis it appear that there was a separate vaginal mass that was not contiguous with the rectal carcinoma.  Dr. Jacobson and I bring her to the operating room for exam under anesthesia.  I was unable to do a pelvic exam in the office due to a narrow vaginal introitus.    Operative Findings:  There is friable tumor visible at the introitus.  This mass extends superiorly for a distance of 7 cm.  It extends from the left vaginal wall at 3:00  to approximately the 7:00 position on the left vaginal wall.  The mass is friable.  Rectal exam confirms an anterior rectal wall lesion that is contiguous with this vaginal mass consistent with extension in to the vagina.  The rectal mass appears fixed to the left pelvic sidewall.    Procedure in Detail:  Patient was brought to the operating room and after induction with general anesthesia was placed in yellow fin stirrups.  After time-out identifying patient and procedure the vulva was inspected and there was visible tumor at the introitus.  An exam under anesthesia shows that this tumor extends superiorly for approximately 7 cm.  It extends from the left vaginal wall at 3:00 to the right vaginal wall at 7:00.    Rectal  examination confirms that there is tumor in the rectum at this level and extends into the vagina.  The tumor feels fixed to the left pelvic sidewall.    At this point Dr. Jacobson performed the flexible sigmoidoscopy.    Given that this is contiguous with the rectal tumor I did not biopsy the vaginal tumor.  However, there was increased bleeding from it and this was controlled with the use of Monsel's solution.    Patient's legs were brought back down to a straight position and she was awakened and taken to the recovery room in stable condition.

## 2020-10-21 ENCOUNTER — DOCUMENTATION ONLY (OUTPATIENT)
Dept: SURGERY | Facility: HOSPITAL | Age: 55
End: 2020-10-21

## 2020-10-21 NOTE — PROGRESS NOTES
Multidisciplinary Rectal Cancer Conference - Evaluation and Recommendation Summary  10/21/2020  Meron Lamar  [id number]  55 y.o. female    1. Evaluation    Colonoscopy 8/28/20 with 3cm ulcerated, fungating mass in distal rectum. EUA performed with possible extension to posterior vaginal wall.    MRI date: 9/29/20    Tumor Location in Rectum: lower third    Indication of Sphincter Involvement:  Uninvolved    Pretreatment Circumferential Resection Margin (CRM) status:  Involved    Pretreatment (clinical) AJCC Stage: IV  Stage I  [] I: T1N0M0  [] I: T2N0M0  Stage II  [] IIA: T3N0M0  [] IIB U0aB2L9  [] IIC: I6cN4P7  Stage III  [] IIIA: T1-2N1M0  [] IIIA: G1V5pG8  [] IIIB: T3-Z6fM2N4  [] IIIB: T2-3N2aM0  [] IIIB: T1-2N2bM0  [] IIIC: F0zX0vK4  [] IIIC: T3-0tF0dG9  [] IIIC: Y2fX6-4P1   Stage IV  [x] IV: S8-6Y2-1X1y-b    CEA level:   Lab Results   Component Value Date    CEA 32.9 (H) 10/09/2020       2. Treatment Recommendation    Neoadjuvant Therapy Recommendation: Short Course Chemoradiotherapy  (given presence of possible liver mets on PET, proceed with short course instead of long course prior to PATRICK). Patient is pending liver biopsy for confirmation/tissue diagnosis.     Type and Duration of Neoadjuvant Therapy Recommended: PATRICK    Anticipated date and type of surgical procedure:  Abdominoperineal Resection (APR)

## 2020-12-29 ENCOUNTER — TELEPHONE (OUTPATIENT)
Dept: SURGERY | Facility: CLINIC | Age: 55
End: 2020-12-29

## 2020-12-29 DIAGNOSIS — C20 RECTAL CANCER: Primary | ICD-10-CM

## 2020-12-29 NOTE — TELEPHONE ENCOUNTER
Spoke with patient. Stated that next Friday was good for appointments. Informed patient that MRI would need to be done at [facility] due to diagnosis of Rectal Cancer. Patient stated understanding. Let patient know I will contact imaging center to get MRI scheduled for Friday 1/8 at 1pm. I called [phone] and [phone]  and no answer. Will continue trying.

## 2020-12-29 NOTE — TELEPHONE ENCOUNTER
Left message to return call regarding appts- Dr Jacobson requested to have patient have MRI and CT scan and appointment with him by next week (1/8). Will message patient through the portal.

## 2021-01-04 RX ORDER — TELMISARTAN 80 MG/1
80 TABLET ORAL DAILY
COMMUNITY
Start: 2020-12-08

## 2021-01-04 RX ORDER — NEOMYCIN SULFATE, POLYMYXIN B SULFATE AND HYDROCORTISONE 10; 3.5; 1 MG/ML; MG/ML; [USP'U]/ML
SUSPENSION/ DROPS AURICULAR (OTIC)
COMMUNITY
End: 2021-03-18

## 2021-01-04 RX ORDER — AMOXICILLIN 875 MG/1
TABLET, FILM COATED ORAL
Status: ON HOLD | COMMUNITY
End: 2021-01-15 | Stop reason: HOSPADM

## 2021-01-04 RX ORDER — METHYLPREDNISOLONE 16 MG/1
TABLET ORAL
COMMUNITY
End: 2021-03-18

## 2021-01-04 RX ORDER — POTASSIUM CHLORIDE 750 MG/1
20 CAPSULE, EXTENDED RELEASE ORAL DAILY
COMMUNITY
Start: 2020-12-21 | End: 2021-08-13 | Stop reason: CLARIF

## 2021-01-04 RX ORDER — METOPROLOL SUCCINATE 100 MG/1
TABLET, EXTENDED RELEASE ORAL
Status: ON HOLD | COMMUNITY
End: 2021-01-13 | Stop reason: SDUPTHER

## 2021-01-04 RX ORDER — PROMETHAZINE HYDROCHLORIDE 12.5 MG/1
12.5 TABLET ORAL EVERY 6 HOURS PRN
COMMUNITY
Start: 2020-10-07 | End: 2021-08-13 | Stop reason: CLARIF

## 2021-01-04 RX ORDER — METFORMIN HYDROCHLORIDE 1000 MG/1
TABLET ORAL
Status: ON HOLD | COMMUNITY
Start: 2020-11-06 | End: 2021-01-13 | Stop reason: DRUGHIGH

## 2021-01-04 RX ORDER — FERROUS SULFATE 325(65) MG
1 TABLET ORAL DAILY
COMMUNITY
Start: 2020-12-01 | End: 2021-09-15

## 2021-01-04 RX ORDER — LIDOCAINE AND PRILOCAINE 25; 25 MG/G; MG/G
CREAM TOPICAL
COMMUNITY
Start: 2020-10-28 | End: 2021-07-22

## 2021-01-04 RX ORDER — AZITHROMYCIN 250 MG/1
TABLET, FILM COATED ORAL
Status: ON HOLD | COMMUNITY
End: 2021-01-15 | Stop reason: HOSPADM

## 2021-01-04 RX ORDER — BROMPHENIRAMINE MALEATE, PSEUDOEPHEDRINE HYDROCHLORIDE, AND DEXTROMETHORPHAN HYDROBROMIDE 2; 30; 10 MG/5ML; MG/5ML; MG/5ML
SYRUP ORAL
COMMUNITY
End: 2021-03-18

## 2021-01-04 RX ORDER — TRIAMCINOLONE ACETONIDE 1 MG/G
CREAM TOPICAL
COMMUNITY
End: 2021-07-22

## 2021-01-04 RX ORDER — TELMISARTAN AND HYDROCHLORTHIAZIDE 40; 12.5 MG/1; MG/1
1 TABLET ORAL
COMMUNITY
Start: 2020-11-06 | End: 2021-03-18

## 2021-01-04 RX ORDER — HYDROCODONE BITARTRATE AND ACETAMINOPHEN 5; 325 MG/1; MG/1
1 TABLET ORAL
COMMUNITY
Start: 2020-11-06 | End: 2021-01-11 | Stop reason: CLARIF

## 2021-01-04 RX ORDER — METFORMIN HYDROCHLORIDE 500 MG/1
TABLET, EXTENDED RELEASE ORAL
COMMUNITY
Start: 2020-12-21 | End: 2021-01-11 | Stop reason: CLARIF

## 2021-01-04 RX ORDER — TELMISARTAN AND HYDROCHLORTHIAZIDE 80; 25 MG/1; MG/1
TABLET ORAL
COMMUNITY
End: 2021-03-18

## 2021-01-04 RX ORDER — DOCUSATE SODIUM 100 MG/1
CAPSULE, LIQUID FILLED ORAL
Status: ON HOLD | COMMUNITY
Start: 2020-10-07 | End: 2021-08-20 | Stop reason: HOSPADM

## 2021-01-04 RX ORDER — IBUPROFEN 200 MG
TABLET ORAL
COMMUNITY
End: 2021-07-22 | Stop reason: SDUPTHER

## 2021-01-04 RX ORDER — LEVOTHYROXINE SODIUM ANHYDROUS 100 UG/5ML
INJECTION, POWDER, LYOPHILIZED, FOR SOLUTION INTRAVENOUS
COMMUNITY
Start: 2020-11-06 | End: 2021-03-18

## 2021-01-04 RX ORDER — FUROSEMIDE 20 MG/1
20 TABLET ORAL
COMMUNITY
End: 2021-03-18

## 2021-01-04 RX ORDER — ONDANSETRON 4 MG/1
TABLET, ORALLY DISINTEGRATING ORAL
COMMUNITY
Start: 2020-10-07 | End: 2021-09-15

## 2021-01-04 RX ORDER — NITROFURANTOIN (MACROCRYSTALS) 100 MG/1
CAPSULE ORAL
Status: ON HOLD | COMMUNITY
Start: 2020-11-11 | End: 2021-01-15 | Stop reason: HOSPADM

## 2021-01-04 RX ORDER — LEVOTHYROXINE SODIUM 25 UG/1
TABLET ORAL
Status: ON HOLD | COMMUNITY
End: 2021-01-13 | Stop reason: SDUPTHER

## 2021-01-04 RX ORDER — MELATONIN 3 MG
LOZENGE ORAL
COMMUNITY
Start: 2020-11-06 | End: 2021-07-22

## 2021-01-04 RX ORDER — AMLODIPINE BESYLATE 10 MG/1
TABLET ORAL
Status: ON HOLD | COMMUNITY
End: 2021-01-13

## 2021-01-04 RX ORDER — TELMISARTAN AND HYDROCHLORTHIAZIDE 80; 12.5 MG/1; MG/1
TABLET ORAL
COMMUNITY
End: 2021-03-18

## 2021-01-04 RX ORDER — ESCITALOPRAM OXALATE 10 MG/1
10 TABLET ORAL NIGHTLY
COMMUNITY
Start: 2020-12-01

## 2021-01-04 RX ORDER — AMLODIPINE BESYLATE 5 MG/1
5 TABLET ORAL DAILY
COMMUNITY
Start: 2020-12-08

## 2021-01-04 RX ORDER — MELATONIN 5 MG
1 CAPSULE ORAL
COMMUNITY
End: 2021-07-22

## 2021-01-08 ENCOUNTER — OFFICE VISIT (OUTPATIENT)
Dept: SURGERY | Facility: CLINIC | Age: 56
End: 2021-01-08
Payer: COMMERCIAL

## 2021-01-08 ENCOUNTER — HOSPITAL ENCOUNTER (OUTPATIENT)
Dept: RADIOLOGY | Facility: HOSPITAL | Age: 56
Discharge: HOME | End: 2021-01-08
Attending: COLON & RECTAL SURGERY
Payer: COMMERCIAL

## 2021-01-08 VITALS
BODY MASS INDEX: 31.12 KG/M2 | SYSTOLIC BLOOD PRESSURE: 185 MMHG | WEIGHT: 187 LBS | HEART RATE: 111 BPM | DIASTOLIC BLOOD PRESSURE: 82 MMHG

## 2021-01-08 DIAGNOSIS — Z71.89 ENCOUNTER FOR OSTOMY CARE EDUCATION: Primary | ICD-10-CM

## 2021-01-08 DIAGNOSIS — C20 RECTAL CANCER: ICD-10-CM

## 2021-01-08 DIAGNOSIS — N82.3 RECTOVAGINAL FISTULA: Primary | ICD-10-CM

## 2021-01-08 DIAGNOSIS — Z01.818 PREOP TESTING: Primary | ICD-10-CM

## 2021-01-08 PROCEDURE — 99999 PR PBB SHADOW E&M-EST. PATIENT-LVL III: ICD-10-PCS | Mod: PBBFAC,,, | Performed by: NURSE PRACTITIONER

## 2021-01-08 PROCEDURE — 3079F DIAST BP 80-89 MM HG: CPT | Mod: CPTII,S$GLB,, | Performed by: COLON & RECTAL SURGERY

## 2021-01-08 PROCEDURE — 99215 OFFICE O/P EST HI 40 MIN: CPT | Mod: S$GLB,,, | Performed by: COLON & RECTAL SURGERY

## 2021-01-08 PROCEDURE — 25500020 PHARM REV CODE 255: Performed by: COLON & RECTAL SURGERY

## 2021-01-08 PROCEDURE — 99999 PR PBB SHADOW E&M-EST. PATIENT-LVL V: ICD-10-PCS | Mod: PBBFAC,,, | Performed by: COLON & RECTAL SURGERY

## 2021-01-08 PROCEDURE — 71260 CT THORAX DX C+: CPT | Mod: 26,,, | Performed by: RADIOLOGY

## 2021-01-08 PROCEDURE — 3079F PR MOST RECENT DIASTOLIC BLOOD PRESSURE 80-89 MM HG: ICD-10-PCS | Mod: CPTII,S$GLB,, | Performed by: COLON & RECTAL SURGERY

## 2021-01-08 PROCEDURE — 72197 MRI PELVIS W/O & W/DYE: CPT | Mod: 26,,, | Performed by: RADIOLOGY

## 2021-01-08 PROCEDURE — 74177 CT CHEST ABDOMEN PELVIS WITH CONTRAST (XPD): ICD-10-PCS | Mod: 26,,, | Performed by: RADIOLOGY

## 2021-01-08 PROCEDURE — 1125F AMNT PAIN NOTED PAIN PRSNT: CPT | Mod: S$GLB,,, | Performed by: COLON & RECTAL SURGERY

## 2021-01-08 PROCEDURE — 99024 PR POST-OP FOLLOW-UP VISIT: ICD-10-PCS | Mod: S$GLB,,, | Performed by: NURSE PRACTITIONER

## 2021-01-08 PROCEDURE — 72197 MRI PELVIS W/O & W/DYE: CPT | Mod: TC

## 2021-01-08 PROCEDURE — 99999 PR PBB SHADOW E&M-EST. PATIENT-LVL III: CPT | Mod: PBBFAC,,, | Performed by: NURSE PRACTITIONER

## 2021-01-08 PROCEDURE — 72197 MRI RECTAL CANCER W W/O CONTRAST: ICD-10-PCS | Mod: 26,,, | Performed by: RADIOLOGY

## 2021-01-08 PROCEDURE — 1125F PR PAIN SEVERITY QUANTIFIED, PAIN PRESENT: ICD-10-PCS | Mod: S$GLB,,, | Performed by: COLON & RECTAL SURGERY

## 2021-01-08 PROCEDURE — 74177 CT ABD & PELVIS W/CONTRAST: CPT | Mod: TC

## 2021-01-08 PROCEDURE — 3008F PR BODY MASS INDEX (BMI) DOCUMENTED: ICD-10-PCS | Mod: CPTII,S$GLB,, | Performed by: COLON & RECTAL SURGERY

## 2021-01-08 PROCEDURE — 3077F SYST BP >= 140 MM HG: CPT | Mod: CPTII,S$GLB,, | Performed by: COLON & RECTAL SURGERY

## 2021-01-08 PROCEDURE — 99024 POSTOP FOLLOW-UP VISIT: CPT | Mod: S$GLB,,, | Performed by: NURSE PRACTITIONER

## 2021-01-08 PROCEDURE — A9585 GADOBUTROL INJECTION: HCPCS | Performed by: COLON & RECTAL SURGERY

## 2021-01-08 PROCEDURE — 99999 PR PBB SHADOW E&M-EST. PATIENT-LVL V: CPT | Mod: PBBFAC,,, | Performed by: COLON & RECTAL SURGERY

## 2021-01-08 PROCEDURE — 99215 PR OFFICE/OUTPT VISIT, EST, LEVL V, 40-54 MIN: ICD-10-PCS | Mod: S$GLB,,, | Performed by: COLON & RECTAL SURGERY

## 2021-01-08 PROCEDURE — 3077F PR MOST RECENT SYSTOLIC BLOOD PRESSURE >= 140 MM HG: ICD-10-PCS | Mod: CPTII,S$GLB,, | Performed by: COLON & RECTAL SURGERY

## 2021-01-08 PROCEDURE — 71260 CT CHEST ABDOMEN PELVIS WITH CONTRAST (XPD): ICD-10-PCS | Mod: 26,,, | Performed by: RADIOLOGY

## 2021-01-08 PROCEDURE — 3008F BODY MASS INDEX DOCD: CPT | Mod: CPTII,S$GLB,, | Performed by: COLON & RECTAL SURGERY

## 2021-01-08 PROCEDURE — 74177 CT ABD & PELVIS W/CONTRAST: CPT | Mod: 26,,, | Performed by: RADIOLOGY

## 2021-01-08 RX ORDER — GADOBUTROL 604.72 MG/ML
10 INJECTION INTRAVENOUS
Status: COMPLETED | OUTPATIENT
Start: 2021-01-08 | End: 2021-01-08

## 2021-01-08 RX ADMIN — IOHEXOL 15 ML: 350 INJECTION, SOLUTION INTRAVENOUS at 02:01

## 2021-01-08 RX ADMIN — IOHEXOL 15 ML: 350 INJECTION, SOLUTION INTRAVENOUS at 03:01

## 2021-01-08 RX ADMIN — GADOBUTROL 10 ML: 604.72 INJECTION INTRAVENOUS at 02:01

## 2021-01-08 RX ADMIN — IOHEXOL 100 ML: 350 INJECTION, SOLUTION INTRAVENOUS at 03:01

## 2021-01-10 ENCOUNTER — CLINICAL SUPPORT (OUTPATIENT)
Dept: URGENT CARE | Facility: CLINIC | Age: 56
DRG: 330 | End: 2021-01-10
Payer: COMMERCIAL

## 2021-01-10 DIAGNOSIS — Z01.818 PREOP TESTING: ICD-10-CM

## 2021-01-10 PROCEDURE — U0003 INFECTIOUS AGENT DETECTION BY NUCLEIC ACID (DNA OR RNA); SEVERE ACUTE RESPIRATORY SYNDROME CORONAVIRUS 2 (SARS-COV-2) (CORONAVIRUS DISEASE [COVID-19]), AMPLIFIED PROBE TECHNIQUE, MAKING USE OF HIGH THROUGHPUT TECHNOLOGIES AS DESCRIBED BY CMS-2020-01-R: HCPCS

## 2021-01-11 ENCOUNTER — TUMOR BOARD CONFERENCE (OUTPATIENT)
Dept: SURGERY | Facility: CLINIC | Age: 56
End: 2021-01-11

## 2021-01-11 LAB — SARS-COV-2 RNA RESP QL NAA+PROBE: NOT DETECTED

## 2021-01-12 ENCOUNTER — ANESTHESIA EVENT (OUTPATIENT)
Dept: SURGERY | Facility: HOSPITAL | Age: 56
DRG: 330 | End: 2021-01-12
Payer: COMMERCIAL

## 2021-01-12 ENCOUNTER — TELEPHONE (OUTPATIENT)
Dept: SURGERY | Facility: CLINIC | Age: 56
End: 2021-01-12

## 2021-01-13 ENCOUNTER — ANESTHESIA (OUTPATIENT)
Dept: SURGERY | Facility: HOSPITAL | Age: 56
DRG: 330 | End: 2021-01-13
Payer: COMMERCIAL

## 2021-01-13 ENCOUNTER — HOSPITAL ENCOUNTER (INPATIENT)
Facility: HOSPITAL | Age: 56
LOS: 2 days | Discharge: HOME | DRG: 330 | End: 2021-01-15
Attending: COLON & RECTAL SURGERY | Admitting: COLON & RECTAL SURGERY
Payer: COMMERCIAL

## 2021-01-13 DIAGNOSIS — N82.3 RECTOVAGINAL FISTULA: Primary | ICD-10-CM

## 2021-01-13 LAB
ABO + RH BLD: NORMAL
BLD GP AB SCN CELLS X3 SERPL QL: NORMAL
ESTIMATED AVG GLUCOSE: 103 MG/DL (ref 68–131)
HBA1C MFR BLD HPLC: 5.2 % (ref 4–5.6)
POCT GLUCOSE: 127 MG/DL (ref 70–110)
POCT GLUCOSE: 129 MG/DL (ref 70–110)
POCT GLUCOSE: 179 MG/DL (ref 70–110)
POCT GLUCOSE: 190 MG/DL (ref 70–110)

## 2021-01-13 PROCEDURE — 25000003 PHARM REV CODE 250: Performed by: STUDENT IN AN ORGANIZED HEALTH CARE EDUCATION/TRAINING PROGRAM

## 2021-01-13 PROCEDURE — 63600175 PHARM REV CODE 636 W HCPCS: Performed by: STUDENT IN AN ORGANIZED HEALTH CARE EDUCATION/TRAINING PROGRAM

## 2021-01-13 PROCEDURE — 71000015 HC POSTOP RECOV 1ST HR: Performed by: COLON & RECTAL SURGERY

## 2021-01-13 PROCEDURE — 63600175 PHARM REV CODE 636 W HCPCS: Performed by: NURSE PRACTITIONER

## 2021-01-13 PROCEDURE — 36000711: Performed by: COLON & RECTAL SURGERY

## 2021-01-13 PROCEDURE — 83036 HEMOGLOBIN GLYCOSYLATED A1C: CPT

## 2021-01-13 PROCEDURE — 86900 BLOOD TYPING SEROLOGIC ABO: CPT

## 2021-01-13 PROCEDURE — 71000033 HC RECOVERY, INTIAL HOUR: Performed by: COLON & RECTAL SURGERY

## 2021-01-13 PROCEDURE — 25000003 PHARM REV CODE 250: Performed by: COLON & RECTAL SURGERY

## 2021-01-13 PROCEDURE — 44206 PR LAP,SURG,COLECTOMY,W/END COLOST & CLOSUR: ICD-10-PCS | Mod: ,,, | Performed by: COLON & RECTAL SURGERY

## 2021-01-13 PROCEDURE — 99900035 HC TECH TIME PER 15 MIN (STAT)

## 2021-01-13 PROCEDURE — 25000003 PHARM REV CODE 250: Performed by: NURSE PRACTITIONER

## 2021-01-13 PROCEDURE — 71000016 HC POSTOP RECOV ADDL HR: Performed by: COLON & RECTAL SURGERY

## 2021-01-13 PROCEDURE — 37000008 HC ANESTHESIA 1ST 15 MINUTES: Performed by: COLON & RECTAL SURGERY

## 2021-01-13 PROCEDURE — 44206 LAP PART COLECTOMY W/STOMA: CPT | Mod: ,,, | Performed by: COLON & RECTAL SURGERY

## 2021-01-13 PROCEDURE — 94761 N-INVAS EAR/PLS OXIMETRY MLT: CPT

## 2021-01-13 PROCEDURE — 27201423 OPTIME MED/SURG SUP & DEVICES STERILE SUPPLY: Performed by: COLON & RECTAL SURGERY

## 2021-01-13 PROCEDURE — S0030 INJECTION, METRONIDAZOLE: HCPCS | Performed by: NURSE PRACTITIONER

## 2021-01-13 PROCEDURE — 82962 GLUCOSE BLOOD TEST: CPT | Performed by: COLON & RECTAL SURGERY

## 2021-01-13 PROCEDURE — D9220A PRA ANESTHESIA: Mod: ,,, | Performed by: ANESTHESIOLOGY

## 2021-01-13 PROCEDURE — 63600175 PHARM REV CODE 636 W HCPCS

## 2021-01-13 PROCEDURE — D9220A PRA ANESTHESIA: ICD-10-PCS | Mod: ,,, | Performed by: ANESTHESIOLOGY

## 2021-01-13 PROCEDURE — 20600001 HC STEP DOWN PRIVATE ROOM

## 2021-01-13 PROCEDURE — 36000710: Performed by: COLON & RECTAL SURGERY

## 2021-01-13 PROCEDURE — 37000009 HC ANESTHESIA EA ADD 15 MINS: Performed by: COLON & RECTAL SURGERY

## 2021-01-13 PROCEDURE — 36415 COLL VENOUS BLD VENIPUNCTURE: CPT

## 2021-01-13 PROCEDURE — 94799 UNLISTED PULMONARY SVC/PX: CPT

## 2021-01-13 RX ORDER — ONDANSETRON 2 MG/ML
INJECTION INTRAMUSCULAR; INTRAVENOUS
Status: DISCONTINUED | OUTPATIENT
Start: 2021-01-13 | End: 2021-01-13

## 2021-01-13 RX ORDER — FENTANYL CITRATE 50 UG/ML
25 INJECTION, SOLUTION INTRAMUSCULAR; INTRAVENOUS EVERY 5 MIN PRN
Status: DISCONTINUED | OUTPATIENT
Start: 2021-01-13 | End: 2021-01-13 | Stop reason: HOSPADM

## 2021-01-13 RX ORDER — FENTANYL CITRATE 50 UG/ML
INJECTION, SOLUTION INTRAMUSCULAR; INTRAVENOUS
Status: DISCONTINUED | OUTPATIENT
Start: 2021-01-13 | End: 2021-01-13

## 2021-01-13 RX ORDER — SODIUM CHLORIDE 9 MG/ML
INJECTION, SOLUTION INTRAVENOUS CONTINUOUS
Status: DISCONTINUED | OUTPATIENT
Start: 2021-01-13 | End: 2021-01-13

## 2021-01-13 RX ORDER — ONDANSETRON 2 MG/ML
4 INJECTION INTRAMUSCULAR; INTRAVENOUS EVERY 12 HOURS PRN
Status: DISCONTINUED | OUTPATIENT
Start: 2021-01-13 | End: 2021-01-15 | Stop reason: HOSPADM

## 2021-01-13 RX ORDER — GLUCAGON 1 MG
1 KIT INJECTION
Status: DISCONTINUED | OUTPATIENT
Start: 2021-01-13 | End: 2021-01-15 | Stop reason: HOSPADM

## 2021-01-13 RX ORDER — SODIUM CHLORIDE 9 MG/ML
INJECTION, SOLUTION INTRAVENOUS CONTINUOUS PRN
Status: DISCONTINUED | OUTPATIENT
Start: 2021-01-13 | End: 2021-01-13

## 2021-01-13 RX ORDER — SODIUM CHLORIDE 9 MG/ML
INJECTION, SOLUTION INTRAVENOUS
Status: COMPLETED | OUTPATIENT
Start: 2021-01-13 | End: 2021-01-13

## 2021-01-13 RX ORDER — TRAMADOL HYDROCHLORIDE 50 MG/1
50 TABLET ORAL EVERY 6 HOURS PRN
Status: DISCONTINUED | OUTPATIENT
Start: 2021-01-13 | End: 2021-01-15 | Stop reason: HOSPADM

## 2021-01-13 RX ORDER — IBUPROFEN 400 MG/1
800 TABLET ORAL EVERY 8 HOURS
Status: DISCONTINUED | OUTPATIENT
Start: 2021-01-14 | End: 2021-01-15 | Stop reason: HOSPADM

## 2021-01-13 RX ORDER — ACETAMINOPHEN 650 MG/20.3ML
975 LIQUID ORAL
Status: COMPLETED | OUTPATIENT
Start: 2021-01-13 | End: 2021-01-13

## 2021-01-13 RX ORDER — LEVOTHYROXINE SODIUM 25 UG/1
25 TABLET ORAL
Status: DISCONTINUED | OUTPATIENT
Start: 2021-01-14 | End: 2021-01-15 | Stop reason: HOSPADM

## 2021-01-13 RX ORDER — MUPIROCIN 20 MG/G
OINTMENT TOPICAL 2 TIMES DAILY
Status: DISCONTINUED | OUTPATIENT
Start: 2021-01-13 | End: 2021-01-15 | Stop reason: HOSPADM

## 2021-01-13 RX ORDER — SODIUM CHLORIDE 0.9 % (FLUSH) 0.9 %
10 SYRINGE (ML) INJECTION
Status: DISCONTINUED | OUTPATIENT
Start: 2021-01-13 | End: 2021-01-15 | Stop reason: HOSPADM

## 2021-01-13 RX ORDER — MIDAZOLAM HYDROCHLORIDE 1 MG/ML
INJECTION INTRAMUSCULAR; INTRAVENOUS
Status: DISCONTINUED | OUTPATIENT
Start: 2021-01-13 | End: 2021-01-13

## 2021-01-13 RX ORDER — OXYCODONE HYDROCHLORIDE 5 MG/1
5 TABLET ORAL EVERY 4 HOURS PRN
Status: DISCONTINUED | OUTPATIENT
Start: 2021-01-13 | End: 2021-01-15 | Stop reason: HOSPADM

## 2021-01-13 RX ORDER — METRONIDAZOLE 500 MG/100ML
500 INJECTION, SOLUTION INTRAVENOUS
Status: COMPLETED | OUTPATIENT
Start: 2021-01-13 | End: 2021-01-13

## 2021-01-13 RX ORDER — SODIUM CHLORIDE 9 MG/ML
INJECTION, SOLUTION INTRAVENOUS CONTINUOUS
Status: DISCONTINUED | OUTPATIENT
Start: 2021-01-13 | End: 2021-01-15

## 2021-01-13 RX ORDER — KETAMINE HCL IN 0.9 % NACL 50 MG/5 ML
SYRINGE (ML) INTRAVENOUS
Status: DISCONTINUED | OUTPATIENT
Start: 2021-01-13 | End: 2021-01-13

## 2021-01-13 RX ORDER — HYDROMORPHONE HYDROCHLORIDE 1 MG/ML
0.2 INJECTION, SOLUTION INTRAMUSCULAR; INTRAVENOUS; SUBCUTANEOUS EVERY 5 MIN PRN
Status: DISCONTINUED | OUTPATIENT
Start: 2021-01-13 | End: 2021-01-13 | Stop reason: HOSPADM

## 2021-01-13 RX ORDER — BUPIVACAINE HYDROCHLORIDE 2.5 MG/ML
INJECTION, SOLUTION EPIDURAL; INFILTRATION; INTRACAUDAL
Status: DISCONTINUED | OUTPATIENT
Start: 2021-01-13 | End: 2021-01-13 | Stop reason: HOSPADM

## 2021-01-13 RX ORDER — ESCITALOPRAM OXALATE 10 MG/1
10 TABLET ORAL NIGHTLY
Status: DISCONTINUED | OUTPATIENT
Start: 2021-01-13 | End: 2021-01-15 | Stop reason: HOSPADM

## 2021-01-13 RX ORDER — TRIPROLIDINE/PSEUDOEPHEDRINE 2.5MG-60MG
600 TABLET ORAL
Status: COMPLETED | OUTPATIENT
Start: 2021-01-13 | End: 2021-01-13

## 2021-01-13 RX ORDER — LIDOCAINE HYDROCHLORIDE 10 MG/ML
1 INJECTION, SOLUTION EPIDURAL; INFILTRATION; INTRACAUDAL; PERINEURAL
Status: COMPLETED | OUTPATIENT
Start: 2021-01-13 | End: 2021-01-13

## 2021-01-13 RX ORDER — PROCHLORPERAZINE EDISYLATE 5 MG/ML
INJECTION INTRAMUSCULAR; INTRAVENOUS
Status: COMPLETED
Start: 2021-01-13 | End: 2021-01-13

## 2021-01-13 RX ORDER — INSULIN ASPART 100 [IU]/ML
0-5 INJECTION, SOLUTION INTRAVENOUS; SUBCUTANEOUS
Status: DISCONTINUED | OUTPATIENT
Start: 2021-01-13 | End: 2021-01-15 | Stop reason: HOSPADM

## 2021-01-13 RX ORDER — PROCHLORPERAZINE EDISYLATE 5 MG/ML
2.5 INJECTION INTRAMUSCULAR; INTRAVENOUS ONCE
Status: COMPLETED | OUTPATIENT
Start: 2021-01-13 | End: 2021-01-13

## 2021-01-13 RX ORDER — PROPOFOL 10 MG/ML
VIAL (ML) INTRAVENOUS
Status: DISCONTINUED | OUTPATIENT
Start: 2021-01-13 | End: 2021-01-13

## 2021-01-13 RX ORDER — NEOSTIGMINE METHYLSULFATE 0.5 MG/ML
INJECTION, SOLUTION INTRAVENOUS
Status: DISCONTINUED | OUTPATIENT
Start: 2021-01-13 | End: 2021-01-13

## 2021-01-13 RX ORDER — GABAPENTIN 300 MG/1
300 CAPSULE ORAL 3 TIMES DAILY
Status: DISCONTINUED | OUTPATIENT
Start: 2021-01-13 | End: 2021-08-16

## 2021-01-13 RX ORDER — OXYCODONE HYDROCHLORIDE 10 MG/1
10 TABLET ORAL EVERY 4 HOURS PRN
Status: DISCONTINUED | OUTPATIENT
Start: 2021-01-13 | End: 2021-01-15 | Stop reason: HOSPADM

## 2021-01-13 RX ORDER — AMLODIPINE BESYLATE 5 MG/1
5 TABLET ORAL DAILY
Status: DISCONTINUED | OUTPATIENT
Start: 2021-01-13 | End: 2021-01-15 | Stop reason: HOSPADM

## 2021-01-13 RX ORDER — GABAPENTIN 300 MG/1
300 CAPSULE ORAL
Status: COMPLETED | OUTPATIENT
Start: 2021-01-13 | End: 2021-01-13

## 2021-01-13 RX ORDER — MUPIROCIN 20 MG/G
1 OINTMENT TOPICAL
Status: COMPLETED | OUTPATIENT
Start: 2021-01-13 | End: 2021-01-13

## 2021-01-13 RX ORDER — ACETAMINOPHEN 10 MG/ML
1000 INJECTION, SOLUTION INTRAVENOUS EVERY 8 HOURS
Status: COMPLETED | OUTPATIENT
Start: 2021-01-13 | End: 2021-01-14

## 2021-01-13 RX ORDER — IBUPROFEN 200 MG
16 TABLET ORAL
Status: DISCONTINUED | OUTPATIENT
Start: 2021-01-13 | End: 2021-01-15 | Stop reason: HOSPADM

## 2021-01-13 RX ORDER — PHENYLEPHRINE HYDROCHLORIDE 10 MG/ML
INJECTION INTRAVENOUS
Status: DISCONTINUED | OUTPATIENT
Start: 2021-01-13 | End: 2021-01-13

## 2021-01-13 RX ORDER — ROCURONIUM BROMIDE 10 MG/ML
INJECTION, SOLUTION INTRAVENOUS
Status: DISCONTINUED | OUTPATIENT
Start: 2021-01-13 | End: 2021-01-13

## 2021-01-13 RX ORDER — LIDOCAINE HYDROCHLORIDE 20 MG/ML
INJECTION INTRAVENOUS
Status: DISCONTINUED | OUTPATIENT
Start: 2021-01-13 | End: 2021-01-13

## 2021-01-13 RX ORDER — IBUPROFEN 200 MG
24 TABLET ORAL
Status: DISCONTINUED | OUTPATIENT
Start: 2021-01-13 | End: 2021-01-15 | Stop reason: HOSPADM

## 2021-01-13 RX ORDER — DEXAMETHASONE SODIUM PHOSPHATE 4 MG/ML
INJECTION, SOLUTION INTRA-ARTICULAR; INTRALESIONAL; INTRAMUSCULAR; INTRAVENOUS; SOFT TISSUE
Status: DISCONTINUED | OUTPATIENT
Start: 2021-01-13 | End: 2021-01-13

## 2021-01-13 RX ORDER — ENOXAPARIN SODIUM 100 MG/ML
40 INJECTION SUBCUTANEOUS EVERY 24 HOURS
Status: DISCONTINUED | OUTPATIENT
Start: 2021-01-14 | End: 2021-01-15 | Stop reason: HOSPADM

## 2021-01-13 RX ORDER — HEPARIN SODIUM 5000 [USP'U]/ML
5000 INJECTION, SOLUTION INTRAVENOUS; SUBCUTANEOUS EVERY 8 HOURS
Status: COMPLETED | OUTPATIENT
Start: 2021-01-13 | End: 2021-01-13

## 2021-01-13 RX ORDER — METOPROLOL TARTRATE 1 MG/ML
5 INJECTION, SOLUTION INTRAVENOUS EVERY 6 HOURS
Status: DISCONTINUED | OUTPATIENT
Start: 2021-01-13 | End: 2021-01-14

## 2021-01-13 RX ORDER — GABAPENTIN 300 MG/1
300 CAPSULE ORAL 3 TIMES DAILY
Status: DISCONTINUED | OUTPATIENT
Start: 2021-01-13 | End: 2021-01-13

## 2021-01-13 RX ORDER — ACETAMINOPHEN 500 MG
1000 TABLET ORAL EVERY 8 HOURS
Status: DISCONTINUED | OUTPATIENT
Start: 2021-01-14 | End: 2021-01-15 | Stop reason: HOSPADM

## 2021-01-13 RX ADMIN — NEOSTIGMINE METHYLSULFATE 5 MG: 0.5 INJECTION INTRAVENOUS at 01:01

## 2021-01-13 RX ADMIN — Medication 10 MG: at 12:01

## 2021-01-13 RX ADMIN — ESCITALOPRAM OXALATE 10 MG: 10 TABLET ORAL at 09:01

## 2021-01-13 RX ADMIN — OXYCODONE HYDROCHLORIDE 10 MG: 10 TABLET ORAL at 09:01

## 2021-01-13 RX ADMIN — PHENYLEPHRINE HYDROCHLORIDE 100 MCG: 10 INJECTION INTRAVENOUS at 12:01

## 2021-01-13 RX ADMIN — DEXAMETHASONE SODIUM PHOSPHATE 4 MG: 4 INJECTION, SOLUTION INTRAMUSCULAR; INTRAVENOUS at 12:01

## 2021-01-13 RX ADMIN — SODIUM CHLORIDE, SODIUM GLUCONATE, SODIUM ACETATE, POTASSIUM CHLORIDE, MAGNESIUM CHLORIDE, SODIUM PHOSPHATE, DIBASIC, AND POTASSIUM PHOSPHATE: .53; .5; .37; .037; .03; .012; .00082 INJECTION, SOLUTION INTRAVENOUS at 12:01

## 2021-01-13 RX ADMIN — ROCURONIUM BROMIDE 40 MG: 10 INJECTION, SOLUTION INTRAVENOUS at 11:01

## 2021-01-13 RX ADMIN — IBUPROFEN 800 MG: 800 INJECTION INTRAVENOUS at 08:01

## 2021-01-13 RX ADMIN — HYDROMORPHONE HYDROCHLORIDE 0.2 MG: 1 INJECTION, SOLUTION INTRAMUSCULAR; INTRAVENOUS; SUBCUTANEOUS at 02:01

## 2021-01-13 RX ADMIN — GABAPENTIN 300 MG: 300 CAPSULE ORAL at 02:01

## 2021-01-13 RX ADMIN — GABAPENTIN 300 MG: 300 CAPSULE ORAL at 09:01

## 2021-01-13 RX ADMIN — FENTANYL CITRATE 100 MCG: 50 INJECTION, SOLUTION INTRAMUSCULAR; INTRAVENOUS at 11:01

## 2021-01-13 RX ADMIN — OXYCODONE HYDROCHLORIDE 10 MG: 10 TABLET ORAL at 02:01

## 2021-01-13 RX ADMIN — Medication 10 MG: at 11:01

## 2021-01-13 RX ADMIN — Medication 20 MG: at 01:01

## 2021-01-13 RX ADMIN — LIDOCAINE HYDROCHLORIDE 0.3 MG: 10 INJECTION, SOLUTION EPIDURAL; INFILTRATION; INTRACAUDAL at 10:01

## 2021-01-13 RX ADMIN — PROCHLORPERAZINE EDISYLATE 2.5 MG: 5 INJECTION INTRAMUSCULAR; INTRAVENOUS at 02:01

## 2021-01-13 RX ADMIN — PROPOFOL 160 MG: 10 INJECTION, EMULSION INTRAVENOUS at 11:01

## 2021-01-13 RX ADMIN — PROMETHAZINE HYDROCHLORIDE 6.25 MG: 25 INJECTION INTRAMUSCULAR; INTRAVENOUS at 05:01

## 2021-01-13 RX ADMIN — MUPIROCIN: 20 OINTMENT TOPICAL at 09:01

## 2021-01-13 RX ADMIN — TRAMADOL HYDROCHLORIDE 50 MG: 50 TABLET, COATED ORAL at 05:01

## 2021-01-13 RX ADMIN — PHENYLEPHRINE HYDROCHLORIDE 200 MCG: 10 INJECTION INTRAVENOUS at 12:01

## 2021-01-13 RX ADMIN — ROCURONIUM BROMIDE 40 MG: 10 INJECTION, SOLUTION INTRAVENOUS at 12:01

## 2021-01-13 RX ADMIN — HEPARIN SODIUM 5000 UNITS: 5000 INJECTION INTRAVENOUS; SUBCUTANEOUS at 09:01

## 2021-01-13 RX ADMIN — ONDANSETRON 4 MG: 2 INJECTION INTRAMUSCULAR; INTRAVENOUS at 02:01

## 2021-01-13 RX ADMIN — LIDOCAINE HYDROCHLORIDE 80 MG: 20 INJECTION, SOLUTION INTRAVENOUS at 11:01

## 2021-01-13 RX ADMIN — ACETAMINOPHEN 1000 MG: 10 INJECTION, SOLUTION INTRAVENOUS at 02:01

## 2021-01-13 RX ADMIN — METRONIDAZOLE 500 MG: 500 INJECTION, SOLUTION INTRAVENOUS at 11:01

## 2021-01-13 RX ADMIN — METOROPROLOL TARTRATE 5 MG: 5 INJECTION, SOLUTION INTRAVENOUS at 06:01

## 2021-01-13 RX ADMIN — ROCURONIUM BROMIDE 20 MG: 10 INJECTION, SOLUTION INTRAVENOUS at 01:01

## 2021-01-13 RX ADMIN — MIDAZOLAM HYDROCHLORIDE 2 MG: 1 INJECTION, SOLUTION INTRAMUSCULAR; INTRAVENOUS at 11:01

## 2021-01-13 RX ADMIN — IBUPROFEN 600 MG: 100 SUSPENSION ORAL at 09:01

## 2021-01-13 RX ADMIN — ONDANSETRON 4 MG: 2 INJECTION, SOLUTION INTRAMUSCULAR; INTRAVENOUS at 01:01

## 2021-01-13 RX ADMIN — CEFTRIAXONE 2 G: 2 INJECTION, SOLUTION INTRAVENOUS at 12:01

## 2021-01-13 RX ADMIN — SODIUM CHLORIDE 10 ML/HR: 0.9 INJECTION, SOLUTION INTRAVENOUS at 10:01

## 2021-01-13 RX ADMIN — SODIUM CHLORIDE 40 ML/HR: 0.9 INJECTION, SOLUTION INTRAVENOUS at 03:01

## 2021-01-13 RX ADMIN — HYDROMORPHONE HYDROCHLORIDE 0.2 MG: 1 INJECTION, SOLUTION INTRAMUSCULAR; INTRAVENOUS; SUBCUTANEOUS at 03:01

## 2021-01-13 RX ADMIN — ACETAMINOPHEN 650 MG: 160 SOLUTION ORAL at 09:01

## 2021-01-13 RX ADMIN — MUPIROCIN 1 G: 20 OINTMENT TOPICAL at 09:01

## 2021-01-13 RX ADMIN — ACETAMINOPHEN 1000 MG: 10 INJECTION, SOLUTION INTRAVENOUS at 09:01

## 2021-01-13 RX ADMIN — SODIUM CHLORIDE 40 ML/HR: 0.9 INJECTION, SOLUTION INTRAVENOUS at 02:01

## 2021-01-13 RX ADMIN — SODIUM CHLORIDE: 0.9 INJECTION, SOLUTION INTRAVENOUS at 10:01

## 2021-01-14 LAB
ANION GAP SERPL CALC-SCNC: 12 MMOL/L (ref 8–16)
BASOPHILS # BLD AUTO: 0.03 K/UL (ref 0–0.2)
BASOPHILS NFR BLD: 0.3 % (ref 0–1.9)
BUN SERPL-MCNC: 20 MG/DL (ref 6–20)
CALCIUM SERPL-MCNC: 8.9 MG/DL (ref 8.7–10.5)
CHLORIDE SERPL-SCNC: 107 MMOL/L (ref 95–110)
CO2 SERPL-SCNC: 22 MMOL/L (ref 23–29)
CREAT SERPL-MCNC: 0.8 MG/DL (ref 0.5–1.4)
DIFFERENTIAL METHOD: ABNORMAL
EOSINOPHIL # BLD AUTO: 0 K/UL (ref 0–0.5)
EOSINOPHIL NFR BLD: 0 % (ref 0–8)
ERYTHROCYTE [DISTWIDTH] IN BLOOD BY AUTOMATED COUNT: 21.4 % (ref 11.5–14.5)
EST. GFR  (AFRICAN AMERICAN): >60 ML/MIN/1.73 M^2
EST. GFR  (NON AFRICAN AMERICAN): >60 ML/MIN/1.73 M^2
GLUCOSE SERPL-MCNC: 84 MG/DL (ref 70–110)
HCT VFR BLD AUTO: 31 % (ref 37–48.5)
HGB BLD-MCNC: 9.2 G/DL (ref 12–16)
IMM GRANULOCYTES # BLD AUTO: 0.03 K/UL (ref 0–0.04)
IMM GRANULOCYTES NFR BLD AUTO: 0.3 % (ref 0–0.5)
LYMPHOCYTES # BLD AUTO: 0.7 K/UL (ref 1–4.8)
LYMPHOCYTES NFR BLD: 6 % (ref 18–48)
MAGNESIUM SERPL-MCNC: 2 MG/DL (ref 1.6–2.6)
MCH RBC QN AUTO: 24.3 PG (ref 27–31)
MCHC RBC AUTO-ENTMCNC: 29.7 G/DL (ref 32–36)
MCV RBC AUTO: 82 FL (ref 82–98)
MONOCYTES # BLD AUTO: 0.6 K/UL (ref 0.3–1)
MONOCYTES NFR BLD: 5.1 % (ref 4–15)
NEUTROPHILS # BLD AUTO: 9.6 K/UL (ref 1.8–7.7)
NEUTROPHILS NFR BLD: 88.3 % (ref 38–73)
NRBC BLD-RTO: 0 /100 WBC
PHOSPHATE SERPL-MCNC: 4 MG/DL (ref 2.7–4.5)
PLATELET # BLD AUTO: 204 K/UL (ref 150–350)
PMV BLD AUTO: 9.6 FL (ref 9.2–12.9)
POCT GLUCOSE: 118 MG/DL (ref 70–110)
POCT GLUCOSE: 96 MG/DL (ref 70–110)
POCT GLUCOSE: 96 MG/DL (ref 70–110)
POCT GLUCOSE: 97 MG/DL (ref 70–110)
POTASSIUM SERPL-SCNC: 3.8 MMOL/L (ref 3.5–5.1)
RBC # BLD AUTO: 3.78 M/UL (ref 4–5.4)
SODIUM SERPL-SCNC: 141 MMOL/L (ref 136–145)
WBC # BLD AUTO: 10.89 K/UL (ref 3.9–12.7)

## 2021-01-14 PROCEDURE — 97161 PT EVAL LOW COMPLEX 20 MIN: CPT

## 2021-01-14 PROCEDURE — 80048 BASIC METABOLIC PNL TOTAL CA: CPT

## 2021-01-14 PROCEDURE — 83735 ASSAY OF MAGNESIUM: CPT

## 2021-01-14 PROCEDURE — 20600001 HC STEP DOWN PRIVATE ROOM

## 2021-01-14 PROCEDURE — 85025 COMPLETE CBC W/AUTO DIFF WBC: CPT

## 2021-01-14 PROCEDURE — 97165 OT EVAL LOW COMPLEX 30 MIN: CPT

## 2021-01-14 PROCEDURE — 25000003 PHARM REV CODE 250: Performed by: STUDENT IN AN ORGANIZED HEALTH CARE EDUCATION/TRAINING PROGRAM

## 2021-01-14 PROCEDURE — 63600175 PHARM REV CODE 636 W HCPCS: Performed by: STUDENT IN AN ORGANIZED HEALTH CARE EDUCATION/TRAINING PROGRAM

## 2021-01-14 PROCEDURE — 25000003 PHARM REV CODE 250: Performed by: NURSE PRACTITIONER

## 2021-01-14 PROCEDURE — 97110 THERAPEUTIC EXERCISES: CPT

## 2021-01-14 PROCEDURE — 36415 COLL VENOUS BLD VENIPUNCTURE: CPT

## 2021-01-14 PROCEDURE — 84100 ASSAY OF PHOSPHORUS: CPT

## 2021-01-14 PROCEDURE — 97530 THERAPEUTIC ACTIVITIES: CPT

## 2021-01-14 RX ORDER — SODIUM CHLORIDE, SODIUM LACTATE, POTASSIUM CHLORIDE, CALCIUM CHLORIDE 600; 310; 30; 20 MG/100ML; MG/100ML; MG/100ML; MG/100ML
INJECTION, SOLUTION INTRAVENOUS CONTINUOUS
Status: DISCONTINUED | OUTPATIENT
Start: 2021-01-14 | End: 2021-01-14

## 2021-01-14 RX ORDER — METOPROLOL SUCCINATE 100 MG/1
100 TABLET, EXTENDED RELEASE ORAL NIGHTLY
Status: DISCONTINUED | OUTPATIENT
Start: 2021-01-14 | End: 2021-01-15 | Stop reason: HOSPADM

## 2021-01-14 RX ADMIN — METOPROLOL SUCCINATE 100 MG: 100 TABLET, EXTENDED RELEASE ORAL at 08:01

## 2021-01-14 RX ADMIN — AMLODIPINE BESYLATE 5 MG: 5 TABLET ORAL at 08:01

## 2021-01-14 RX ADMIN — OXYCODONE HYDROCHLORIDE 10 MG: 10 TABLET ORAL at 10:01

## 2021-01-14 RX ADMIN — LEVOTHYROXINE SODIUM 25 MCG: 25 TABLET ORAL at 06:01

## 2021-01-14 RX ADMIN — MUPIROCIN: 20 OINTMENT TOPICAL at 08:01

## 2021-01-14 RX ADMIN — GABAPENTIN 300 MG: 300 CAPSULE ORAL at 08:01

## 2021-01-14 RX ADMIN — OXYCODONE HYDROCHLORIDE 10 MG: 10 TABLET ORAL at 06:01

## 2021-01-14 RX ADMIN — IBUPROFEN 800 MG: 400 TABLET, FILM COATED ORAL at 10:01

## 2021-01-14 RX ADMIN — METOROPROLOL TARTRATE 5 MG: 5 INJECTION, SOLUTION INTRAVENOUS at 01:01

## 2021-01-14 RX ADMIN — GABAPENTIN 300 MG: 300 CAPSULE ORAL at 03:01

## 2021-01-14 RX ADMIN — IBUPROFEN 800 MG: 400 TABLET, FILM COATED ORAL at 03:01

## 2021-01-14 RX ADMIN — ACETAMINOPHEN 1000 MG: 500 TABLET ORAL at 03:01

## 2021-01-14 RX ADMIN — OXYCODONE HYDROCHLORIDE 5 MG: 5 TABLET ORAL at 06:01

## 2021-01-14 RX ADMIN — TRAMADOL HYDROCHLORIDE 50 MG: 50 TABLET, COATED ORAL at 01:01

## 2021-01-14 RX ADMIN — METOROPROLOL TARTRATE 5 MG: 5 INJECTION, SOLUTION INTRAVENOUS at 06:01

## 2021-01-14 RX ADMIN — ACETAMINOPHEN 1000 MG: 500 TABLET ORAL at 10:01

## 2021-01-14 RX ADMIN — ENOXAPARIN SODIUM 40 MG: 40 INJECTION SUBCUTANEOUS at 05:01

## 2021-01-14 RX ADMIN — ESCITALOPRAM OXALATE 10 MG: 10 TABLET ORAL at 08:01

## 2021-01-14 RX ADMIN — OXYCODONE HYDROCHLORIDE 5 MG: 5 TABLET ORAL at 11:01

## 2021-01-14 RX ADMIN — ACETAMINOPHEN 1000 MG: 10 INJECTION, SOLUTION INTRAVENOUS at 06:01

## 2021-01-14 RX ADMIN — IBUPROFEN 800 MG: 800 INJECTION INTRAVENOUS at 08:01

## 2021-01-14 RX ADMIN — ONDANSETRON 4 MG: 2 INJECTION INTRAMUSCULAR; INTRAVENOUS at 08:01

## 2021-01-15 VITALS
RESPIRATION RATE: 20 BRPM | DIASTOLIC BLOOD PRESSURE: 79 MMHG | BODY MASS INDEX: 31.12 KG/M2 | OXYGEN SATURATION: 94 % | HEART RATE: 56 BPM | SYSTOLIC BLOOD PRESSURE: 138 MMHG | TEMPERATURE: 98 F | HEIGHT: 65 IN

## 2021-01-15 LAB — POCT GLUCOSE: 96 MG/DL (ref 70–110)

## 2021-01-15 PROCEDURE — 25000003 PHARM REV CODE 250: Performed by: STUDENT IN AN ORGANIZED HEALTH CARE EDUCATION/TRAINING PROGRAM

## 2021-01-15 PROCEDURE — 97116 GAIT TRAINING THERAPY: CPT

## 2021-01-15 RX ORDER — OXYCODONE HYDROCHLORIDE 5 MG/1
5 TABLET ORAL EVERY 4 HOURS PRN
Qty: 30 TABLET | Refills: 0 | Status: ON HOLD | OUTPATIENT
Start: 2021-01-15 | End: 2021-04-09 | Stop reason: SDUPTHER

## 2021-01-15 RX ADMIN — AMLODIPINE BESYLATE 5 MG: 5 TABLET ORAL at 08:01

## 2021-01-15 RX ADMIN — LEVOTHYROXINE SODIUM 25 MCG: 25 TABLET ORAL at 06:01

## 2021-01-15 RX ADMIN — OXYCODONE HYDROCHLORIDE 5 MG: 5 TABLET ORAL at 08:01

## 2021-01-15 RX ADMIN — ACETAMINOPHEN 1000 MG: 500 TABLET ORAL at 06:01

## 2021-01-15 RX ADMIN — GABAPENTIN 300 MG: 300 CAPSULE ORAL at 08:01

## 2021-01-15 RX ADMIN — IBUPROFEN 800 MG: 400 TABLET, FILM COATED ORAL at 06:01

## 2021-01-15 RX ADMIN — MUPIROCIN: 20 OINTMENT TOPICAL at 08:01

## 2021-01-18 PROCEDURE — G0180 PR HOME HEALTH MD CERTIFICATION: ICD-10-PCS | Mod: ,,, | Performed by: COLON & RECTAL SURGERY

## 2021-01-18 PROCEDURE — G0180 MD CERTIFICATION HHA PATIENT: HCPCS | Mod: ,,, | Performed by: COLON & RECTAL SURGERY

## 2021-01-22 ENCOUNTER — DOCUMENT SCAN (OUTPATIENT)
Dept: HOME HEALTH SERVICES | Facility: HOSPITAL | Age: 56
End: 2021-01-22
Payer: COMMERCIAL

## 2021-01-23 PROBLEM — C78.7 COLON CANCER METASTASIZED TO LIVER: Status: ACTIVE | Noted: 2020-10-06

## 2021-01-23 PROBLEM — C18.9 COLON CANCER METASTASIZED TO LIVER: Status: ACTIVE | Noted: 2020-10-06

## 2021-01-26 ENCOUNTER — TELEPHONE (OUTPATIENT)
Dept: SURGERY | Facility: CLINIC | Age: 56
End: 2021-01-26

## 2021-01-26 ENCOUNTER — PATIENT MESSAGE (OUTPATIENT)
Dept: SURGERY | Facility: CLINIC | Age: 56
End: 2021-01-26

## 2021-02-02 ENCOUNTER — OFFICE VISIT (OUTPATIENT)
Dept: WOUND CARE | Facility: CLINIC | Age: 56
End: 2021-02-02
Payer: COMMERCIAL

## 2021-02-02 ENCOUNTER — EXTERNAL HOME HEALTH (OUTPATIENT)
Dept: HOME HEALTH SERVICES | Facility: HOSPITAL | Age: 56
End: 2021-02-02
Payer: COMMERCIAL

## 2021-02-02 ENCOUNTER — OFFICE VISIT (OUTPATIENT)
Dept: SURGERY | Facility: CLINIC | Age: 56
End: 2021-02-02
Payer: COMMERCIAL

## 2021-02-02 DIAGNOSIS — Z71.89 ENCOUNTER FOR OSTOMY CARE EDUCATION: ICD-10-CM

## 2021-02-02 DIAGNOSIS — C78.7 COLON CANCER METASTASIZED TO LIVER: Primary | ICD-10-CM

## 2021-02-02 DIAGNOSIS — C18.9 COLON CANCER METASTASIZED TO LIVER: Primary | ICD-10-CM

## 2021-02-02 DIAGNOSIS — N82.3 RECTOVAGINAL FISTULA: ICD-10-CM

## 2021-02-02 DIAGNOSIS — Z43.3 ATTENTION TO COLOSTOMY: Primary | ICD-10-CM

## 2021-02-02 PROCEDURE — 99024 PR POST-OP FOLLOW-UP VISIT: ICD-10-PCS | Mod: S$GLB,,, | Performed by: CLINICAL NURSE SPECIALIST

## 2021-02-02 PROCEDURE — 99024 PR POST-OP FOLLOW-UP VISIT: ICD-10-PCS | Mod: S$GLB,,, | Performed by: COLON & RECTAL SURGERY

## 2021-02-02 PROCEDURE — 99024 POSTOP FOLLOW-UP VISIT: CPT | Mod: S$GLB,,, | Performed by: CLINICAL NURSE SPECIALIST

## 2021-02-02 PROCEDURE — 99999 PR PBB SHADOW E&M-EST. PATIENT-LVL II: ICD-10-PCS | Mod: PBBFAC,,, | Performed by: CLINICAL NURSE SPECIALIST

## 2021-02-02 PROCEDURE — 99024 POSTOP FOLLOW-UP VISIT: CPT | Mod: S$GLB,,, | Performed by: COLON & RECTAL SURGERY

## 2021-02-02 PROCEDURE — 99999 PR PBB SHADOW E&M-EST. PATIENT-LVL I: ICD-10-PCS | Mod: PBBFAC,,, | Performed by: COLON & RECTAL SURGERY

## 2021-02-02 PROCEDURE — 99999 PR PBB SHADOW E&M-EST. PATIENT-LVL II: CPT | Mod: PBBFAC,,, | Performed by: CLINICAL NURSE SPECIALIST

## 2021-02-02 PROCEDURE — 99999 PR PBB SHADOW E&M-EST. PATIENT-LVL I: CPT | Mod: PBBFAC,,, | Performed by: COLON & RECTAL SURGERY

## 2021-02-25 DIAGNOSIS — C18.9 COLON CANCER METASTASIZED TO LIVER: Primary | ICD-10-CM

## 2021-02-25 DIAGNOSIS — C78.7 COLON CANCER METASTASIZED TO LIVER: ICD-10-CM

## 2021-02-25 DIAGNOSIS — C18.9 COLON CANCER METASTASIZED TO LIVER: ICD-10-CM

## 2021-02-25 DIAGNOSIS — C78.7 COLON CANCER METASTASIZED TO LIVER: Primary | ICD-10-CM

## 2021-02-25 DIAGNOSIS — N82.3 RECTOVAGINAL FISTULA: Primary | ICD-10-CM

## 2021-02-25 RX ORDER — SODIUM CHLORIDE 9 MG/ML
INJECTION, SOLUTION INTRAVENOUS CONTINUOUS
Status: CANCELLED | OUTPATIENT
Start: 2021-02-25

## 2021-02-25 RX ORDER — ENOXAPARIN SODIUM 100 MG/ML
40 INJECTION SUBCUTANEOUS EVERY 24 HOURS
Status: CANCELLED | OUTPATIENT
Start: 2021-02-25

## 2021-02-25 RX ORDER — LIDOCAINE HYDROCHLORIDE 10 MG/ML
1 INJECTION, SOLUTION EPIDURAL; INFILTRATION; INTRACAUDAL; PERINEURAL ONCE
Status: CANCELLED | OUTPATIENT
Start: 2021-02-25 | End: 2021-02-25

## 2021-02-26 ENCOUNTER — OFFICE VISIT (OUTPATIENT)
Dept: WOUND CARE | Facility: CLINIC | Age: 56
End: 2021-02-26
Payer: COMMERCIAL

## 2021-02-26 DIAGNOSIS — Z43.3 ATTENTION TO COLOSTOMY: Primary | ICD-10-CM

## 2021-02-26 PROCEDURE — 99024 PR POST-OP FOLLOW-UP VISIT: ICD-10-PCS | Mod: S$GLB,,, | Performed by: CLINICAL NURSE SPECIALIST

## 2021-02-26 PROCEDURE — 99999 PR PBB SHADOW E&M-EST. PATIENT-LVL II: ICD-10-PCS | Mod: PBBFAC,,, | Performed by: CLINICAL NURSE SPECIALIST

## 2021-02-26 PROCEDURE — 99024 POSTOP FOLLOW-UP VISIT: CPT | Mod: S$GLB,,, | Performed by: CLINICAL NURSE SPECIALIST

## 2021-02-26 PROCEDURE — 99999 PR PBB SHADOW E&M-EST. PATIENT-LVL II: CPT | Mod: PBBFAC,,, | Performed by: CLINICAL NURSE SPECIALIST

## 2021-03-02 ENCOUNTER — TELEPHONE (OUTPATIENT)
Dept: SURGERY | Facility: CLINIC | Age: 56
End: 2021-03-02

## 2021-03-02 DIAGNOSIS — C18.9 COLON CANCER METASTASIZED TO LIVER: Primary | ICD-10-CM

## 2021-03-02 DIAGNOSIS — N82.3 RECTOVAGINAL FISTULA: ICD-10-CM

## 2021-03-02 DIAGNOSIS — C78.7 COLON CANCER METASTASIZED TO LIVER: Primary | ICD-10-CM

## 2021-03-08 ENCOUNTER — TELEPHONE (OUTPATIENT)
Dept: SURGERY | Facility: CLINIC | Age: 56
End: 2021-03-08

## 2021-03-10 ENCOUNTER — DOCUMENT SCAN (OUTPATIENT)
Dept: HOME HEALTH SERVICES | Facility: HOSPITAL | Age: 56
End: 2021-03-10
Payer: COMMERCIAL

## 2021-03-18 ENCOUNTER — HOSPITAL ENCOUNTER (OUTPATIENT)
Dept: CARDIOLOGY | Facility: CLINIC | Age: 56
Discharge: HOME | End: 2021-03-18
Payer: COMMERCIAL

## 2021-03-18 ENCOUNTER — OFFICE VISIT (OUTPATIENT)
Dept: SURGERY | Facility: CLINIC | Age: 56
End: 2021-03-18
Payer: COMMERCIAL

## 2021-03-18 VITALS
RESPIRATION RATE: 18 BRPM | HEART RATE: 80 BPM | BODY MASS INDEX: 30.71 KG/M2 | DIASTOLIC BLOOD PRESSURE: 72 MMHG | WEIGHT: 184.31 LBS | SYSTOLIC BLOOD PRESSURE: 132 MMHG | OXYGEN SATURATION: 98 % | HEIGHT: 65 IN

## 2021-03-18 DIAGNOSIS — C78.7 COLON CANCER METASTASIZED TO LIVER: Primary | ICD-10-CM

## 2021-03-18 DIAGNOSIS — C78.7 COLON CANCER METASTASIZED TO LIVER: ICD-10-CM

## 2021-03-18 DIAGNOSIS — C18.9 COLON CANCER METASTASIZED TO LIVER: ICD-10-CM

## 2021-03-18 DIAGNOSIS — C18.9 COLON CANCER METASTASIZED TO LIVER: Primary | ICD-10-CM

## 2021-03-18 PROCEDURE — 3078F PR MOST RECENT DIASTOLIC BLOOD PRESSURE < 80 MM HG: ICD-10-PCS | Mod: CPTII,S$GLB,, | Performed by: SURGERY

## 2021-03-18 PROCEDURE — 93010 ELECTROCARDIOGRAM REPORT: CPT | Mod: S$GLB,,, | Performed by: INTERNAL MEDICINE

## 2021-03-18 PROCEDURE — 3075F PR MOST RECENT SYSTOLIC BLOOD PRESS GE 130-139MM HG: ICD-10-PCS | Mod: CPTII,S$GLB,, | Performed by: SURGERY

## 2021-03-18 PROCEDURE — 3008F BODY MASS INDEX DOCD: CPT | Mod: CPTII,S$GLB,, | Performed by: SURGERY

## 2021-03-18 PROCEDURE — 3078F DIAST BP <80 MM HG: CPT | Mod: CPTII,S$GLB,, | Performed by: SURGERY

## 2021-03-18 PROCEDURE — 3075F SYST BP GE 130 - 139MM HG: CPT | Mod: CPTII,S$GLB,, | Performed by: SURGERY

## 2021-03-18 PROCEDURE — 1126F PR PAIN SEVERITY QUANTIFIED, NO PAIN PRESENT: ICD-10-PCS | Mod: S$GLB,,, | Performed by: SURGERY

## 2021-03-18 PROCEDURE — 3008F PR BODY MASS INDEX (BMI) DOCUMENTED: ICD-10-PCS | Mod: CPTII,S$GLB,, | Performed by: SURGERY

## 2021-03-18 PROCEDURE — 93005 EKG 12-LEAD: ICD-10-PCS | Mod: S$GLB,,, | Performed by: SURGERY

## 2021-03-18 PROCEDURE — 99999 PR PBB SHADOW E&M-EST. PATIENT-LVL III: CPT | Mod: PBBFAC,,, | Performed by: SURGERY

## 2021-03-18 PROCEDURE — 93010 EKG 12-LEAD: ICD-10-PCS | Mod: S$GLB,,, | Performed by: INTERNAL MEDICINE

## 2021-03-18 PROCEDURE — 93005 ELECTROCARDIOGRAM TRACING: CPT | Mod: S$GLB,,, | Performed by: SURGERY

## 2021-03-18 PROCEDURE — 99999 PR PBB SHADOW E&M-EST. PATIENT-LVL III: ICD-10-PCS | Mod: PBBFAC,,, | Performed by: SURGERY

## 2021-03-18 PROCEDURE — 1126F AMNT PAIN NOTED NONE PRSNT: CPT | Mod: S$GLB,,, | Performed by: SURGERY

## 2021-03-18 PROCEDURE — 99204 OFFICE O/P NEW MOD 45 MIN: CPT | Mod: S$GLB,,, | Performed by: SURGERY

## 2021-03-18 PROCEDURE — 99204 PR OFFICE/OUTPT VISIT, NEW, LEVL IV, 45-59 MIN: ICD-10-PCS | Mod: S$GLB,,, | Performed by: SURGERY

## 2021-03-18 RX ORDER — GABAPENTIN 100 MG/1
100 CAPSULE ORAL 2 TIMES DAILY
COMMUNITY
Start: 2021-02-24

## 2021-03-18 RX ORDER — DIPHENOXYLATE HYDROCHLORIDE AND ATROPINE SULFATE 2.5; .025 MG/1; MG/1
1 TABLET ORAL 4 TIMES DAILY PRN
Status: ON HOLD | COMMUNITY
Start: 2021-02-03 | End: 2021-08-20 | Stop reason: HOSPADM

## 2021-03-20 ENCOUNTER — ANESTHESIA EVENT (OUTPATIENT)
Dept: SURGERY | Facility: HOSPITAL | Age: 56
DRG: 406 | End: 2021-03-20
Payer: COMMERCIAL

## 2021-03-22 ENCOUNTER — PATIENT MESSAGE (OUTPATIENT)
Dept: INFECTIOUS DISEASES | Facility: CLINIC | Age: 56
End: 2021-03-22

## 2021-03-24 ENCOUNTER — RESEARCH ENCOUNTER (OUTPATIENT)
Dept: RESEARCH | Facility: HOSPITAL | Age: 56
End: 2021-03-24

## 2021-04-03 ENCOUNTER — HOSPITAL ENCOUNTER (OUTPATIENT)
Dept: PREADMISSION TESTING | Facility: HOSPITAL | Age: 56
Discharge: HOME | End: 2021-04-03
Attending: SURGERY
Payer: COMMERCIAL

## 2021-04-03 DIAGNOSIS — Z01.818 PRE-OP TESTING: ICD-10-CM

## 2021-04-03 LAB — SARS-COV-2 RNA RESP QL NAA+PROBE: NOT DETECTED

## 2021-04-03 PROCEDURE — U0005 INFEC AGEN DETEC AMPLI PROBE: HCPCS | Performed by: SURGERY

## 2021-04-03 PROCEDURE — U0003 INFECTIOUS AGENT DETECTION BY NUCLEIC ACID (DNA OR RNA); SEVERE ACUTE RESPIRATORY SYNDROME CORONAVIRUS 2 (SARS-COV-2) (CORONAVIRUS DISEASE [COVID-19]), AMPLIFIED PROBE TECHNIQUE, MAKING USE OF HIGH THROUGHPUT TECHNOLOGIES AS DESCRIBED BY CMS-2020-01-R: HCPCS | Performed by: SURGERY

## 2021-04-06 ENCOUNTER — HOSPITAL ENCOUNTER (INPATIENT)
Facility: HOSPITAL | Age: 56
LOS: 3 days | Discharge: HOME HEALTH | DRG: 406 | End: 2021-04-09
Attending: SURGERY | Admitting: SURGERY
Payer: COMMERCIAL

## 2021-04-06 ENCOUNTER — ANESTHESIA (OUTPATIENT)
Dept: SURGERY | Facility: HOSPITAL | Age: 56
DRG: 406 | End: 2021-04-06
Payer: COMMERCIAL

## 2021-04-06 DIAGNOSIS — C78.7 COLON CANCER METASTASIZED TO LIVER: Primary | ICD-10-CM

## 2021-04-06 DIAGNOSIS — N82.3 RECTOVAGINAL FISTULA: ICD-10-CM

## 2021-04-06 DIAGNOSIS — C18.9 COLON CANCER METASTASIZED TO LIVER: Primary | ICD-10-CM

## 2021-04-06 LAB
ABO + RH BLD: NORMAL
ALBUMIN SERPL BCP-MCNC: 3.2 G/DL (ref 3.5–5.2)
ALP SERPL-CCNC: 72 U/L (ref 55–135)
ALT SERPL W/O P-5'-P-CCNC: 282 U/L (ref 10–44)
ANION GAP SERPL CALC-SCNC: 7 MMOL/L (ref 8–16)
AST SERPL-CCNC: 470 U/L (ref 10–40)
BASOPHILS # BLD AUTO: 0.04 K/UL (ref 0–0.2)
BASOPHILS NFR BLD: 0.4 % (ref 0–1.9)
BILIRUB SERPL-MCNC: 1.4 MG/DL (ref 0.1–1)
BLD GP AB SCN CELLS X3 SERPL QL: NORMAL
BUN SERPL-MCNC: 12 MG/DL (ref 6–20)
CALCIUM SERPL-MCNC: 8.3 MG/DL (ref 8.7–10.5)
CHLORIDE SERPL-SCNC: 110 MMOL/L (ref 95–110)
CO2 SERPL-SCNC: 21 MMOL/L (ref 23–29)
CREAT SERPL-MCNC: 0.7 MG/DL (ref 0.5–1.4)
DIFFERENTIAL METHOD: ABNORMAL
EOSINOPHIL # BLD AUTO: 0.2 K/UL (ref 0–0.5)
EOSINOPHIL NFR BLD: 1.4 % (ref 0–8)
ERYTHROCYTE [DISTWIDTH] IN BLOOD BY AUTOMATED COUNT: 16.8 % (ref 11.5–14.5)
EST. GFR  (AFRICAN AMERICAN): >60 ML/MIN/1.73 M^2
EST. GFR  (NON AFRICAN AMERICAN): >60 ML/MIN/1.73 M^2
GLUCOSE SERPL-MCNC: 149 MG/DL (ref 70–110)
GLUCOSE SERPL-MCNC: 151 MG/DL (ref 70–110)
GLUCOSE SERPL-MCNC: 169 MG/DL (ref 70–110)
GLUCOSE SERPL-MCNC: 187 MG/DL (ref 70–110)
HCO3 UR-SCNC: 17.8 MMOL/L (ref 24–28)
HCO3 UR-SCNC: 19.5 MMOL/L (ref 24–28)
HCO3 UR-SCNC: 20.4 MMOL/L (ref 24–28)
HCT VFR BLD AUTO: 25.8 % (ref 37–48.5)
HCT VFR BLD CALC: 15 %PCV (ref 36–54)
HCT VFR BLD CALC: 23 %PCV (ref 36–54)
HCT VFR BLD CALC: 26 %PCV (ref 36–54)
HGB BLD-MCNC: 8 G/DL (ref 12–16)
IMM GRANULOCYTES # BLD AUTO: 0.09 K/UL (ref 0–0.04)
IMM GRANULOCYTES NFR BLD AUTO: 0.8 % (ref 0–0.5)
LYMPHOCYTES # BLD AUTO: 0.6 K/UL (ref 1–4.8)
LYMPHOCYTES NFR BLD: 5.5 % (ref 18–48)
MAGNESIUM SERPL-MCNC: 2.1 MG/DL (ref 1.6–2.6)
MCH RBC QN AUTO: 26.5 PG (ref 27–31)
MCHC RBC AUTO-ENTMCNC: 31 G/DL (ref 32–36)
MCV RBC AUTO: 85 FL (ref 82–98)
MONOCYTES # BLD AUTO: 0.5 K/UL (ref 0.3–1)
MONOCYTES NFR BLD: 4.3 % (ref 4–15)
NEUTROPHILS # BLD AUTO: 9.3 K/UL (ref 1.8–7.7)
NEUTROPHILS NFR BLD: 87.6 % (ref 38–73)
NRBC BLD-RTO: 0 /100 WBC
PCO2 BLDA: 34.1 MMHG (ref 35–45)
PCO2 BLDA: 41.4 MMHG (ref 35–45)
PCO2 BLDA: 43.2 MMHG (ref 35–45)
PH SMN: 7.26 [PH] (ref 7.35–7.45)
PH SMN: 7.3 [PH] (ref 7.35–7.45)
PH SMN: 7.33 [PH] (ref 7.35–7.45)
PHOSPHATE SERPL-MCNC: 5.1 MG/DL (ref 2.7–4.5)
PLATELET # BLD AUTO: 168 K/UL (ref 150–450)
PMV BLD AUTO: 9.1 FL (ref 9.2–12.9)
PO2 BLDA: 164 MMHG (ref 80–100)
PO2 BLDA: 180 MMHG (ref 80–100)
PO2 BLDA: 188 MMHG (ref 80–100)
POC BE: -6 MMOL/L
POC BE: -8 MMOL/L
POC BE: -8 MMOL/L
POC IONIZED CALCIUM: 0.96 MMOL/L (ref 1.06–1.42)
POC IONIZED CALCIUM: 1.12 MMOL/L (ref 1.06–1.42)
POC IONIZED CALCIUM: 1.15 MMOL/L (ref 1.06–1.42)
POC SATURATED O2: 100 % (ref 95–100)
POC SATURATED O2: 99 % (ref 95–100)
POC SATURATED O2: 99 % (ref 95–100)
POC TCO2: 19 MMOL/L (ref 23–27)
POC TCO2: 21 MMOL/L (ref 23–27)
POC TCO2: 22 MMOL/L (ref 23–27)
POCT GLUCOSE: 150 MG/DL (ref 70–110)
POCT GLUCOSE: 154 MG/DL (ref 70–110)
POTASSIUM BLD-SCNC: 4.3 MMOL/L (ref 3.5–5.1)
POTASSIUM BLD-SCNC: 4.4 MMOL/L (ref 3.5–5.1)
POTASSIUM BLD-SCNC: 4.5 MMOL/L (ref 3.5–5.1)
POTASSIUM SERPL-SCNC: 4.9 MMOL/L (ref 3.5–5.1)
PROT SERPL-MCNC: 5.5 G/DL (ref 6–8.4)
RBC # BLD AUTO: 3.02 M/UL (ref 4–5.4)
SAMPLE: ABNORMAL
SODIUM BLD-SCNC: 140 MMOL/L (ref 136–145)
SODIUM BLD-SCNC: 140 MMOL/L (ref 136–145)
SODIUM BLD-SCNC: 141 MMOL/L (ref 136–145)
SODIUM SERPL-SCNC: 138 MMOL/L (ref 136–145)
WBC # BLD AUTO: 10.65 K/UL (ref 3.9–12.7)

## 2021-04-06 PROCEDURE — 63600175 PHARM REV CODE 636 W HCPCS: Performed by: SURGERY

## 2021-04-06 PROCEDURE — 88377 M/PHMTRC ALYS ISHQUANT/SEMIQ: CPT | Performed by: PATHOLOGY

## 2021-04-06 PROCEDURE — 71000015 HC POSTOP RECOV 1ST HR: Performed by: SURGERY

## 2021-04-06 PROCEDURE — 36556 PR INSERT NON-TUNNEL CV CATH 5+ YRS OLD: ICD-10-PCS | Mod: 59,,, | Performed by: SURGERY

## 2021-04-06 PROCEDURE — 36556 INSERT NON-TUNNEL CV CATH: CPT | Mod: 59,,, | Performed by: SURGERY

## 2021-04-06 PROCEDURE — 25000003 PHARM REV CODE 250: Performed by: STUDENT IN AN ORGANIZED HEALTH CARE EDUCATION/TRAINING PROGRAM

## 2021-04-06 PROCEDURE — 27200677 HC TRANSDUCER MONITOR KIT SINGLE: Performed by: SURGERY

## 2021-04-06 PROCEDURE — 47379 PR RESECTION, LIVER, PARTIAL LOBECTOMY, LAPAROSCOPIC: ICD-10-PCS | Mod: ,,, | Performed by: SURGERY

## 2021-04-06 PROCEDURE — 76940 PR  US GUIDE, TISSUE ABLATION: ICD-10-PCS | Mod: 26,,, | Performed by: SURGERY

## 2021-04-06 PROCEDURE — 36000712 HC OR TIME LEV V 1ST 15 MIN: Performed by: SURGERY

## 2021-04-06 PROCEDURE — 88360 TUMOR IMMUNOHISTOCHEM/MANUAL: CPT | Mod: 91 | Performed by: PATHOLOGY

## 2021-04-06 PROCEDURE — 63600175 PHARM REV CODE 636 W HCPCS

## 2021-04-06 PROCEDURE — 37000008 HC ANESTHESIA 1ST 15 MINUTES: Performed by: SURGERY

## 2021-04-06 PROCEDURE — 86920 COMPATIBILITY TEST SPIN: CPT | Performed by: SURGERY

## 2021-04-06 PROCEDURE — 84100 ASSAY OF PHOSPHORUS: CPT | Performed by: STUDENT IN AN ORGANIZED HEALTH CARE EDUCATION/TRAINING PROGRAM

## 2021-04-06 PROCEDURE — 25000242 PHARM REV CODE 250 ALT 637 W/ HCPCS: Performed by: STUDENT IN AN ORGANIZED HEALTH CARE EDUCATION/TRAINING PROGRAM

## 2021-04-06 PROCEDURE — 88313 SPECIAL STAINS GROUP 2: CPT | Performed by: PATHOLOGY

## 2021-04-06 PROCEDURE — 99900035 HC TECH TIME PER 15 MIN (STAT)

## 2021-04-06 PROCEDURE — 25000003 PHARM REV CODE 250: Performed by: SURGERY

## 2021-04-06 PROCEDURE — 83735 ASSAY OF MAGNESIUM: CPT | Performed by: STUDENT IN AN ORGANIZED HEALTH CARE EDUCATION/TRAINING PROGRAM

## 2021-04-06 PROCEDURE — 37000009 HC ANESTHESIA EA ADD 15 MINS: Performed by: SURGERY

## 2021-04-06 PROCEDURE — 88307 PR  SURG PATH,LEVEL V: ICD-10-PCS | Mod: 26,,, | Performed by: PATHOLOGY

## 2021-04-06 PROCEDURE — 47370 LAPARO ABLATE LIVER TUMOR RF: CPT | Mod: ,,, | Performed by: SURGERY

## 2021-04-06 PROCEDURE — P9045 ALBUMIN (HUMAN), 5%, 250 ML: HCPCS | Mod: JG | Performed by: SURGERY

## 2021-04-06 PROCEDURE — 76940 US GUIDE TISSUE ABLATION: CPT | Mod: 26,,, | Performed by: SURGERY

## 2021-04-06 PROCEDURE — 81194 NTRK TRANSLOCATION ANALYSIS: CPT | Performed by: PATHOLOGY

## 2021-04-06 PROCEDURE — 94640 AIRWAY INHALATION TREATMENT: CPT

## 2021-04-06 PROCEDURE — 88313 PR  SPECIAL STAINS,GROUP II: ICD-10-PCS | Mod: 26,,, | Performed by: PATHOLOGY

## 2021-04-06 PROCEDURE — 36620 INSERTION CATHETER ARTERY: CPT | Mod: 59,,, | Performed by: SURGERY

## 2021-04-06 PROCEDURE — 88342 CHG IMMUNOCYTOCHEMISTRY: ICD-10-PCS | Mod: 26,,, | Performed by: PATHOLOGY

## 2021-04-06 PROCEDURE — 88307 TISSUE EXAM BY PATHOLOGIST: CPT | Mod: 26,,, | Performed by: PATHOLOGY

## 2021-04-06 PROCEDURE — 82962 GLUCOSE BLOOD TEST: CPT | Performed by: SURGERY

## 2021-04-06 PROCEDURE — 71000039 HC RECOVERY, EACH ADD'L HOUR: Performed by: SURGERY

## 2021-04-06 PROCEDURE — 88381 MICRODISSECTION MANUAL: CPT | Performed by: PATHOLOGY

## 2021-04-06 PROCEDURE — 86900 BLOOD TYPING SEROLOGIC ABO: CPT | Performed by: SURGERY

## 2021-04-06 PROCEDURE — 88313 SPECIAL STAINS GROUP 2: CPT | Mod: 26,,, | Performed by: PATHOLOGY

## 2021-04-06 PROCEDURE — 36620 PR INSERT CATH,ART,PERCUT,SHORTTERM: ICD-10-PCS | Mod: 59,,, | Performed by: SURGERY

## 2021-04-06 PROCEDURE — P9016 RBC LEUKOCYTES REDUCED: HCPCS | Performed by: SURGERY

## 2021-04-06 PROCEDURE — C1894 INTRO/SHEATH, NON-LASER: HCPCS | Performed by: SURGERY

## 2021-04-06 PROCEDURE — D9220A PRA ANESTHESIA: ICD-10-PCS | Mod: ,,, | Performed by: SURGERY

## 2021-04-06 PROCEDURE — C1751 CATH, INF, PER/CENT/MIDLINE: HCPCS | Performed by: SURGERY

## 2021-04-06 PROCEDURE — 63600175 PHARM REV CODE 636 W HCPCS: Performed by: STUDENT IN AN ORGANIZED HEALTH CARE EDUCATION/TRAINING PROGRAM

## 2021-04-06 PROCEDURE — 47379 UNLISTED LAPS PX LIVER: CPT | Mod: ,,, | Performed by: SURGERY

## 2021-04-06 PROCEDURE — 27201423 OPTIME MED/SURG SUP & DEVICES STERILE SUPPLY: Performed by: SURGERY

## 2021-04-06 PROCEDURE — 88307 TISSUE EXAM BY PATHOLOGIST: CPT | Performed by: PATHOLOGY

## 2021-04-06 PROCEDURE — 27201037 HC PRESSURE MONITORING SET UP

## 2021-04-06 PROCEDURE — 88377 M/PHMTRC ALYS ISHQUANT/SEMIQ: CPT | Mod: 91 | Performed by: PATHOLOGY

## 2021-04-06 PROCEDURE — 88342 IMHCHEM/IMCYTCHM 1ST ANTB: CPT | Mod: 26,,, | Performed by: PATHOLOGY

## 2021-04-06 PROCEDURE — 80053 COMPREHEN METABOLIC PANEL: CPT | Performed by: STUDENT IN AN ORGANIZED HEALTH CARE EDUCATION/TRAINING PROGRAM

## 2021-04-06 PROCEDURE — 94761 N-INVAS EAR/PLS OXIMETRY MLT: CPT

## 2021-04-06 PROCEDURE — 85025 COMPLETE CBC W/AUTO DIFF WBC: CPT | Performed by: SURGERY

## 2021-04-06 PROCEDURE — 36415 COLL VENOUS BLD VENIPUNCTURE: CPT | Performed by: STUDENT IN AN ORGANIZED HEALTH CARE EDUCATION/TRAINING PROGRAM

## 2021-04-06 PROCEDURE — 47370 PR LAP,ABLAT 1+ LIVER TUMOR(S),RADIOFREQ: ICD-10-PCS | Mod: ,,, | Performed by: SURGERY

## 2021-04-06 PROCEDURE — 71000033 HC RECOVERY, INTIAL HOUR: Performed by: SURGERY

## 2021-04-06 PROCEDURE — 36000713 HC OR TIME LEV V EA ADD 15 MIN: Performed by: SURGERY

## 2021-04-06 PROCEDURE — 88360 TUMOR IMMUNOHISTOCHEM/MANUAL: CPT | Performed by: PATHOLOGY

## 2021-04-06 PROCEDURE — D9220A PRA ANESTHESIA: Mod: ,,, | Performed by: SURGERY

## 2021-04-06 PROCEDURE — 20600001 HC STEP DOWN PRIVATE ROOM

## 2021-04-06 RX ORDER — ONDANSETRON 2 MG/ML
4 INJECTION INTRAMUSCULAR; INTRAVENOUS EVERY 6 HOURS PRN
Status: DISCONTINUED | OUTPATIENT
Start: 2021-04-06 | End: 2021-04-09 | Stop reason: HOSPADM

## 2021-04-06 RX ORDER — IBUPROFEN 200 MG
24 TABLET ORAL
Status: DISCONTINUED | OUTPATIENT
Start: 2021-04-06 | End: 2021-04-09 | Stop reason: HOSPADM

## 2021-04-06 RX ORDER — INSULIN ASPART 100 [IU]/ML
0-5 INJECTION, SOLUTION INTRAVENOUS; SUBCUTANEOUS
Status: DISCONTINUED | OUTPATIENT
Start: 2021-04-06 | End: 2021-04-09 | Stop reason: HOSPADM

## 2021-04-06 RX ORDER — PROPOFOL 10 MG/ML
VIAL (ML) INTRAVENOUS
Status: DISCONTINUED | OUTPATIENT
Start: 2021-04-06 | End: 2021-04-06

## 2021-04-06 RX ORDER — ONDANSETRON 2 MG/ML
INJECTION INTRAMUSCULAR; INTRAVENOUS
Status: DISCONTINUED | OUTPATIENT
Start: 2021-04-06 | End: 2021-04-06

## 2021-04-06 RX ORDER — GABAPENTIN 100 MG/1
100 CAPSULE ORAL 3 TIMES DAILY
Status: DISCONTINUED | OUTPATIENT
Start: 2021-04-06 | End: 2021-04-09 | Stop reason: HOSPADM

## 2021-04-06 RX ORDER — FENTANYL CITRATE 50 UG/ML
INJECTION, SOLUTION INTRAMUSCULAR; INTRAVENOUS
Status: DISCONTINUED | OUTPATIENT
Start: 2021-04-06 | End: 2021-04-06

## 2021-04-06 RX ORDER — HYDROMORPHONE HCL IN 0.9% NACL 6 MG/30 ML
PATIENT CONTROLLED ANALGESIA SYRINGE INTRAVENOUS CONTINUOUS
Status: DISCONTINUED | OUTPATIENT
Start: 2021-04-06 | End: 2021-04-07

## 2021-04-06 RX ORDER — BUPIVACAINE HYDROCHLORIDE 5 MG/ML
INJECTION, SOLUTION EPIDURAL; INTRACAUDAL
Status: DISCONTINUED | OUTPATIENT
Start: 2021-04-06 | End: 2021-04-06 | Stop reason: HOSPADM

## 2021-04-06 RX ORDER — LEVALBUTEROL INHALATION SOLUTION 0.63 MG/3ML
0.63 SOLUTION RESPIRATORY (INHALATION)
Status: COMPLETED | OUTPATIENT
Start: 2021-04-06 | End: 2021-04-08

## 2021-04-06 RX ORDER — ROCURONIUM BROMIDE 10 MG/ML
INJECTION, SOLUTION INTRAVENOUS
Status: DISCONTINUED | OUTPATIENT
Start: 2021-04-06 | End: 2021-04-06

## 2021-04-06 RX ORDER — KETAMINE HCL IN 0.9 % NACL 50 MG/5 ML
SYRINGE (ML) INTRAVENOUS
Status: DISCONTINUED | OUTPATIENT
Start: 2021-04-06 | End: 2021-04-06

## 2021-04-06 RX ORDER — GLUCAGON 1 MG
1 KIT INJECTION
Status: DISCONTINUED | OUTPATIENT
Start: 2021-04-06 | End: 2021-04-09 | Stop reason: HOSPADM

## 2021-04-06 RX ORDER — LEVOTHYROXINE SODIUM 25 UG/1
25 TABLET ORAL
Status: DISCONTINUED | OUTPATIENT
Start: 2021-04-07 | End: 2021-04-09 | Stop reason: HOSPADM

## 2021-04-06 RX ORDER — ACETAMINOPHEN 325 MG/1
650 TABLET ORAL EVERY 4 HOURS PRN
Status: DISCONTINUED | OUTPATIENT
Start: 2021-04-06 | End: 2021-04-09 | Stop reason: HOSPADM

## 2021-04-06 RX ORDER — SODIUM CHLORIDE 0.9 % (FLUSH) 0.9 %
10 SYRINGE (ML) INJECTION
Status: DISCONTINUED | OUTPATIENT
Start: 2021-04-06 | End: 2021-04-06 | Stop reason: HOSPADM

## 2021-04-06 RX ORDER — ENOXAPARIN SODIUM 100 MG/ML
40 INJECTION SUBCUTANEOUS EVERY 24 HOURS
Status: DISCONTINUED | OUTPATIENT
Start: 2021-04-06 | End: 2021-04-06

## 2021-04-06 RX ORDER — HYDROMORPHONE HCL IN 0.9% NACL 6 MG/30 ML
PATIENT CONTROLLED ANALGESIA SYRINGE INTRAVENOUS
Status: COMPLETED
Start: 2021-04-06 | End: 2021-04-06

## 2021-04-06 RX ORDER — SODIUM CHLORIDE 9 MG/ML
INJECTION, SOLUTION INTRAVENOUS CONTINUOUS
Status: DISCONTINUED | OUTPATIENT
Start: 2021-04-06 | End: 2021-04-07

## 2021-04-06 RX ORDER — DEXAMETHASONE SODIUM PHOSPHATE 4 MG/ML
INJECTION, SOLUTION INTRA-ARTICULAR; INTRALESIONAL; INTRAMUSCULAR; INTRAVENOUS; SOFT TISSUE
Status: DISCONTINUED | OUTPATIENT
Start: 2021-04-06 | End: 2021-04-06

## 2021-04-06 RX ORDER — FENTANYL CITRATE 50 UG/ML
25 INJECTION, SOLUTION INTRAMUSCULAR; INTRAVENOUS EVERY 5 MIN PRN
Status: COMPLETED | OUTPATIENT
Start: 2021-04-06 | End: 2021-04-06

## 2021-04-06 RX ORDER — ACETAMINOPHEN 10 MG/ML
1000 INJECTION, SOLUTION INTRAVENOUS ONCE
Status: COMPLETED | OUTPATIENT
Start: 2021-04-06 | End: 2021-04-06

## 2021-04-06 RX ORDER — ALBUMIN HUMAN 50 G/1000ML
SOLUTION INTRAVENOUS CONTINUOUS PRN
Status: DISCONTINUED | OUTPATIENT
Start: 2021-04-06 | End: 2021-04-06

## 2021-04-06 RX ORDER — DOCUSATE SODIUM 100 MG/1
100 CAPSULE, LIQUID FILLED ORAL 2 TIMES DAILY
Status: DISCONTINUED | OUTPATIENT
Start: 2021-04-06 | End: 2021-04-09 | Stop reason: HOSPADM

## 2021-04-06 RX ORDER — METOPROLOL SUCCINATE 100 MG/1
100 TABLET, EXTENDED RELEASE ORAL NIGHTLY
Status: DISCONTINUED | OUTPATIENT
Start: 2021-04-07 | End: 2021-04-09 | Stop reason: HOSPADM

## 2021-04-06 RX ORDER — LIDOCAINE HYDROCHLORIDE 10 MG/ML
1 INJECTION, SOLUTION EPIDURAL; INFILTRATION; INTRACAUDAL; PERINEURAL ONCE
Status: DISCONTINUED | OUTPATIENT
Start: 2021-04-06 | End: 2021-04-06

## 2021-04-06 RX ORDER — SODIUM CHLORIDE 9 MG/ML
INJECTION, SOLUTION INTRAVENOUS CONTINUOUS
Status: DISCONTINUED | OUTPATIENT
Start: 2021-04-06 | End: 2021-04-06

## 2021-04-06 RX ORDER — AMLODIPINE BESYLATE 5 MG/1
5 TABLET ORAL DAILY
Status: DISCONTINUED | OUTPATIENT
Start: 2021-04-07 | End: 2021-04-09 | Stop reason: HOSPADM

## 2021-04-06 RX ORDER — MIDAZOLAM HYDROCHLORIDE 1 MG/ML
INJECTION INTRAMUSCULAR; INTRAVENOUS
Status: DISCONTINUED | OUTPATIENT
Start: 2021-04-06 | End: 2021-04-06

## 2021-04-06 RX ORDER — NALOXONE HCL 0.4 MG/ML
0.02 VIAL (ML) INJECTION
Status: DISCONTINUED | OUTPATIENT
Start: 2021-04-06 | End: 2021-04-07

## 2021-04-06 RX ORDER — NEOSTIGMINE METHYLSULFATE 0.5 MG/ML
INJECTION, SOLUTION INTRAVENOUS
Status: DISCONTINUED | OUTPATIENT
Start: 2021-04-06 | End: 2021-04-06

## 2021-04-06 RX ORDER — HALOPERIDOL 5 MG/ML
0.5 INJECTION INTRAMUSCULAR EVERY 10 MIN PRN
Status: DISCONTINUED | OUTPATIENT
Start: 2021-04-06 | End: 2021-04-06 | Stop reason: HOSPADM

## 2021-04-06 RX ORDER — LIDOCAINE HCL/PF 100 MG/5ML
SYRINGE (ML) INTRAVENOUS
Status: DISCONTINUED | OUTPATIENT
Start: 2021-04-06 | End: 2021-04-06

## 2021-04-06 RX ORDER — PHENYLEPHRINE HYDROCHLORIDE 10 MG/ML
INJECTION INTRAVENOUS
Status: DISCONTINUED | OUTPATIENT
Start: 2021-04-06 | End: 2021-04-06

## 2021-04-06 RX ORDER — IBUPROFEN 200 MG
16 TABLET ORAL
Status: DISCONTINUED | OUTPATIENT
Start: 2021-04-06 | End: 2021-04-09 | Stop reason: HOSPADM

## 2021-04-06 RX ORDER — KETOROLAC TROMETHAMINE 30 MG/ML
15 INJECTION, SOLUTION INTRAMUSCULAR; INTRAVENOUS EVERY 6 HOURS
Status: DISCONTINUED | OUTPATIENT
Start: 2021-04-06 | End: 2021-04-09 | Stop reason: HOSPADM

## 2021-04-06 RX ORDER — ONDANSETRON 2 MG/ML
4 INJECTION INTRAMUSCULAR; INTRAVENOUS ONCE AS NEEDED
Status: DISCONTINUED | OUTPATIENT
Start: 2021-04-06 | End: 2021-04-06 | Stop reason: HOSPADM

## 2021-04-06 RX ORDER — HYDROMORPHONE HYDROCHLORIDE 1 MG/ML
0.2 INJECTION, SOLUTION INTRAMUSCULAR; INTRAVENOUS; SUBCUTANEOUS EVERY 5 MIN PRN
Status: DISCONTINUED | OUTPATIENT
Start: 2021-04-06 | End: 2021-04-06 | Stop reason: HOSPADM

## 2021-04-06 RX ORDER — ESCITALOPRAM OXALATE 10 MG/1
10 TABLET ORAL NIGHTLY
Status: DISCONTINUED | OUTPATIENT
Start: 2021-04-06 | End: 2021-04-09 | Stop reason: HOSPADM

## 2021-04-06 RX ADMIN — Medication: at 03:04

## 2021-04-06 RX ADMIN — FENTANYL CITRATE 25 MCG: 50 INJECTION, SOLUTION INTRAMUSCULAR; INTRAVENOUS at 03:04

## 2021-04-06 RX ADMIN — Medication 10 MG: at 11:04

## 2021-04-06 RX ADMIN — CALCIUM CHLORIDE 0.5 G: 100 INJECTION, SOLUTION INTRAVENOUS at 01:04

## 2021-04-06 RX ADMIN — CALCIUM CHLORIDE 0.5 G: 100 INJECTION, SOLUTION INTRAVENOUS at 11:04

## 2021-04-06 RX ADMIN — LEVALBUTEROL HYDROCHLORIDE 0.63 MG: 0.63 SOLUTION RESPIRATORY (INHALATION) at 07:04

## 2021-04-06 RX ADMIN — PROPOFOL 20 MG: 10 INJECTION, EMULSION INTRAVENOUS at 07:04

## 2021-04-06 RX ADMIN — ROCURONIUM BROMIDE 30 MG: 10 INJECTION, SOLUTION INTRAVENOUS at 07:04

## 2021-04-06 RX ADMIN — ROCURONIUM BROMIDE 50 MG: 10 INJECTION, SOLUTION INTRAVENOUS at 11:04

## 2021-04-06 RX ADMIN — PIPERACILLIN AND TAZOBACTAM 4.5 G: 4; .5 INJECTION, POWDER, LYOPHILIZED, FOR SOLUTION INTRAVENOUS; PARENTERAL at 08:04

## 2021-04-06 RX ADMIN — Medication: at 07:04

## 2021-04-06 RX ADMIN — NEOSTIGMINE METHYLSULFATE 5 MG: 0.5 INJECTION INTRAVENOUS at 02:04

## 2021-04-06 RX ADMIN — PIPERACILLIN AND TAZOBACTAM 4.5 G: 4; .5 INJECTION, POWDER, LYOPHILIZED, FOR SOLUTION INTRAVENOUS; PARENTERAL at 10:04

## 2021-04-06 RX ADMIN — PROPOFOL 130 MG: 10 INJECTION, EMULSION INTRAVENOUS at 07:04

## 2021-04-06 RX ADMIN — PHENYLEPHRINE HYDROCHLORIDE 100 MCG: 10 INJECTION INTRAVENOUS at 07:04

## 2021-04-06 RX ADMIN — Medication: at 05:04

## 2021-04-06 RX ADMIN — KETOROLAC TROMETHAMINE 15 MG: 30 INJECTION, SOLUTION INTRAMUSCULAR at 05:04

## 2021-04-06 RX ADMIN — ROCURONIUM BROMIDE 30 MG: 10 INJECTION, SOLUTION INTRAVENOUS at 10:04

## 2021-04-06 RX ADMIN — ROCURONIUM BROMIDE 50 MG: 10 INJECTION, SOLUTION INTRAVENOUS at 07:04

## 2021-04-06 RX ADMIN — FENTANYL CITRATE 100 MCG: 50 INJECTION, SOLUTION INTRAMUSCULAR; INTRAVENOUS at 07:04

## 2021-04-06 RX ADMIN — FENTANYL CITRATE 50 MCG: 50 INJECTION, SOLUTION INTRAMUSCULAR; INTRAVENOUS at 08:04

## 2021-04-06 RX ADMIN — SUGAMMADEX 200 MG: 100 INJECTION, SOLUTION INTRAVENOUS at 02:04

## 2021-04-06 RX ADMIN — Medication 10 MG: at 09:04

## 2021-04-06 RX ADMIN — LIDOCAINE HYDROCHLORIDE 100 MG: 20 INJECTION, SOLUTION INTRAVENOUS at 07:04

## 2021-04-06 RX ADMIN — SODIUM CHLORIDE, SODIUM GLUCONATE, SODIUM ACETATE, POTASSIUM CHLORIDE, MAGNESIUM CHLORIDE, SODIUM PHOSPHATE, DIBASIC, AND POTASSIUM PHOSPHATE: .53; .5; .37; .037; .03; .012; .00082 INJECTION, SOLUTION INTRAVENOUS at 10:04

## 2021-04-06 RX ADMIN — DEXAMETHASONE SODIUM PHOSPHATE 4 MG: 4 INJECTION, SOLUTION INTRAMUSCULAR; INTRAVENOUS at 08:04

## 2021-04-06 RX ADMIN — MIDAZOLAM HYDROCHLORIDE 2 MG: 1 INJECTION, SOLUTION INTRAMUSCULAR; INTRAVENOUS at 07:04

## 2021-04-06 RX ADMIN — SODIUM CHLORIDE: 0.9 INJECTION, SOLUTION INTRAVENOUS at 03:04

## 2021-04-06 RX ADMIN — FENTANYL CITRATE 25 MCG: 50 INJECTION, SOLUTION INTRAMUSCULAR; INTRAVENOUS at 02:04

## 2021-04-06 RX ADMIN — ACETAMINOPHEN 1000 MG: 10 INJECTION, SOLUTION INTRAVENOUS at 03:04

## 2021-04-06 RX ADMIN — PIPERACILLIN AND TAZOBACTAM 4.5 G: 4; .5 INJECTION, POWDER, LYOPHILIZED, FOR SOLUTION INTRAVENOUS; PARENTERAL at 01:04

## 2021-04-06 RX ADMIN — ROCURONIUM BROMIDE 20 MG: 10 INJECTION, SOLUTION INTRAVENOUS at 09:04

## 2021-04-06 RX ADMIN — ROCURONIUM BROMIDE 50 MG: 10 INJECTION, SOLUTION INTRAVENOUS at 12:04

## 2021-04-06 RX ADMIN — FENTANYL CITRATE 50 MCG: 50 INJECTION, SOLUTION INTRAMUSCULAR; INTRAVENOUS at 02:04

## 2021-04-06 RX ADMIN — ROCURONIUM BROMIDE 20 MG: 10 INJECTION, SOLUTION INTRAVENOUS at 08:04

## 2021-04-06 RX ADMIN — ESCITALOPRAM OXALATE 10 MG: 10 TABLET ORAL at 09:04

## 2021-04-06 RX ADMIN — ONDANSETRON 4 MG: 2 INJECTION, SOLUTION INTRAMUSCULAR; INTRAVENOUS at 01:04

## 2021-04-06 RX ADMIN — GLYCOPYRROLATE 0.6 MG: 0.2 INJECTION, SOLUTION INTRAMUSCULAR; INTRAVITREAL at 02:04

## 2021-04-06 RX ADMIN — CALCIUM CHLORIDE 0.5 G: 100 INJECTION, SOLUTION INTRAVENOUS at 12:04

## 2021-04-06 RX ADMIN — PHENYLEPHRINE HYDROCHLORIDE 200 MCG: 10 INJECTION INTRAVENOUS at 08:04

## 2021-04-06 RX ADMIN — PHENYLEPHRINE HYDROCHLORIDE 200 MCG: 10 INJECTION INTRAVENOUS at 10:04

## 2021-04-06 RX ADMIN — Medication 30 MG: at 07:04

## 2021-04-06 RX ADMIN — ONDANSETRON 4 MG: 2 INJECTION INTRAMUSCULAR; INTRAVENOUS at 07:04

## 2021-04-06 RX ADMIN — GABAPENTIN 100 MG: 100 CAPSULE ORAL at 03:04

## 2021-04-06 RX ADMIN — GABAPENTIN 100 MG: 100 CAPSULE ORAL at 09:04

## 2021-04-06 RX ADMIN — SODIUM CHLORIDE, SODIUM GLUCONATE, SODIUM ACETATE, POTASSIUM CHLORIDE, MAGNESIUM CHLORIDE, SODIUM PHOSPHATE, DIBASIC, AND POTASSIUM PHOSPHATE: .53; .5; .37; .037; .03; .012; .00082 INJECTION, SOLUTION INTRAVENOUS at 08:04

## 2021-04-06 RX ADMIN — ALBUMIN (HUMAN): 12.5 SOLUTION INTRAVENOUS at 11:04

## 2021-04-06 RX ADMIN — SODIUM CHLORIDE: 0.9 INJECTION, SOLUTION INTRAVENOUS at 06:04

## 2021-04-06 RX ADMIN — KETOROLAC TROMETHAMINE 15 MG: 30 INJECTION, SOLUTION INTRAMUSCULAR at 11:04

## 2021-04-06 RX ADMIN — DOCUSATE SODIUM 100 MG: 100 CAPSULE, LIQUID FILLED ORAL at 09:04

## 2021-04-07 LAB
ALBUMIN SERPL BCP-MCNC: 3.1 G/DL (ref 3.5–5.2)
ALP SERPL-CCNC: 73 U/L (ref 55–135)
ALT SERPL W/O P-5'-P-CCNC: 459 U/L (ref 10–44)
ANION GAP SERPL CALC-SCNC: 8 MMOL/L (ref 8–16)
ANION GAP SERPL CALC-SCNC: 8 MMOL/L (ref 8–16)
AST SERPL-CCNC: 689 U/L (ref 10–40)
BASOPHILS # BLD AUTO: 0.03 K/UL (ref 0–0.2)
BASOPHILS NFR BLD: 0.2 % (ref 0–1.9)
BILIRUB SERPL-MCNC: 0.5 MG/DL (ref 0.1–1)
BUN SERPL-MCNC: 19 MG/DL (ref 6–20)
BUN SERPL-MCNC: 22 MG/DL (ref 6–20)
CALCIUM SERPL-MCNC: 8.5 MG/DL (ref 8.7–10.5)
CALCIUM SERPL-MCNC: 8.6 MG/DL (ref 8.7–10.5)
CHLORIDE SERPL-SCNC: 108 MMOL/L (ref 95–110)
CHLORIDE SERPL-SCNC: 112 MMOL/L (ref 95–110)
CO2 SERPL-SCNC: 22 MMOL/L (ref 23–29)
CO2 SERPL-SCNC: 22 MMOL/L (ref 23–29)
CREAT SERPL-MCNC: 0.9 MG/DL (ref 0.5–1.4)
CREAT SERPL-MCNC: 0.9 MG/DL (ref 0.5–1.4)
DIFFERENTIAL METHOD: ABNORMAL
EOSINOPHIL # BLD AUTO: 0 K/UL (ref 0–0.5)
EOSINOPHIL NFR BLD: 0.1 % (ref 0–8)
ERYTHROCYTE [DISTWIDTH] IN BLOOD BY AUTOMATED COUNT: 16.5 % (ref 11.5–14.5)
EST. GFR  (AFRICAN AMERICAN): >60 ML/MIN/1.73 M^2
EST. GFR  (AFRICAN AMERICAN): >60 ML/MIN/1.73 M^2
EST. GFR  (NON AFRICAN AMERICAN): >60 ML/MIN/1.73 M^2
EST. GFR  (NON AFRICAN AMERICAN): >60 ML/MIN/1.73 M^2
GLUCOSE SERPL-MCNC: 108 MG/DL (ref 70–110)
GLUCOSE SERPL-MCNC: 115 MG/DL (ref 70–110)
HCT VFR BLD AUTO: 35.2 % (ref 37–48.5)
HGB BLD-MCNC: 11.1 G/DL (ref 12–16)
IMM GRANULOCYTES # BLD AUTO: 0.12 K/UL (ref 0–0.04)
IMM GRANULOCYTES NFR BLD AUTO: 0.7 % (ref 0–0.5)
LYMPHOCYTES # BLD AUTO: 0.6 K/UL (ref 1–4.8)
LYMPHOCYTES NFR BLD: 3.6 % (ref 18–48)
MAGNESIUM SERPL-MCNC: 2.1 MG/DL (ref 1.6–2.6)
MCH RBC QN AUTO: 27 PG (ref 27–31)
MCHC RBC AUTO-ENTMCNC: 31.5 G/DL (ref 32–36)
MCV RBC AUTO: 86 FL (ref 82–98)
MONOCYTES # BLD AUTO: 1.5 K/UL (ref 0.3–1)
MONOCYTES NFR BLD: 8.6 % (ref 4–15)
NEUTROPHILS # BLD AUTO: 15.3 K/UL (ref 1.8–7.7)
NEUTROPHILS NFR BLD: 86.8 % (ref 38–73)
NRBC BLD-RTO: 0 /100 WBC
PHOSPHATE SERPL-MCNC: 6 MG/DL (ref 2.7–4.5)
PLATELET # BLD AUTO: 225 K/UL (ref 150–450)
PMV BLD AUTO: 9.5 FL (ref 9.2–12.9)
POCT GLUCOSE: 100 MG/DL (ref 70–110)
POCT GLUCOSE: 104 MG/DL (ref 70–110)
POTASSIUM SERPL-SCNC: 4.7 MMOL/L (ref 3.5–5.1)
POTASSIUM SERPL-SCNC: 5.6 MMOL/L (ref 3.5–5.1)
PROT SERPL-MCNC: 5.4 G/DL (ref 6–8.4)
RBC # BLD AUTO: 4.11 M/UL (ref 4–5.4)
SODIUM SERPL-SCNC: 138 MMOL/L (ref 136–145)
SODIUM SERPL-SCNC: 142 MMOL/L (ref 136–145)
WBC # BLD AUTO: 17.58 K/UL (ref 3.9–12.7)

## 2021-04-07 PROCEDURE — 63600175 PHARM REV CODE 636 W HCPCS: Performed by: SURGERY

## 2021-04-07 PROCEDURE — 99900035 HC TECH TIME PER 15 MIN (STAT)

## 2021-04-07 PROCEDURE — 94640 AIRWAY INHALATION TREATMENT: CPT

## 2021-04-07 PROCEDURE — 25000003 PHARM REV CODE 250: Performed by: SURGERY

## 2021-04-07 PROCEDURE — 94664 DEMO&/EVAL PT USE INHALER: CPT

## 2021-04-07 PROCEDURE — 84100 ASSAY OF PHOSPHORUS: CPT | Performed by: STUDENT IN AN ORGANIZED HEALTH CARE EDUCATION/TRAINING PROGRAM

## 2021-04-07 PROCEDURE — 97165 OT EVAL LOW COMPLEX 30 MIN: CPT

## 2021-04-07 PROCEDURE — 85025 COMPLETE CBC W/AUTO DIFF WBC: CPT | Performed by: STUDENT IN AN ORGANIZED HEALTH CARE EDUCATION/TRAINING PROGRAM

## 2021-04-07 PROCEDURE — 25000003 PHARM REV CODE 250: Performed by: STUDENT IN AN ORGANIZED HEALTH CARE EDUCATION/TRAINING PROGRAM

## 2021-04-07 PROCEDURE — 97535 SELF CARE MNGMENT TRAINING: CPT

## 2021-04-07 PROCEDURE — 80053 COMPREHEN METABOLIC PANEL: CPT | Performed by: STUDENT IN AN ORGANIZED HEALTH CARE EDUCATION/TRAINING PROGRAM

## 2021-04-07 PROCEDURE — 97530 THERAPEUTIC ACTIVITIES: CPT

## 2021-04-07 PROCEDURE — 25000003 PHARM REV CODE 250

## 2021-04-07 PROCEDURE — 25000242 PHARM REV CODE 250 ALT 637 W/ HCPCS: Performed by: STUDENT IN AN ORGANIZED HEALTH CARE EDUCATION/TRAINING PROGRAM

## 2021-04-07 PROCEDURE — 27000221 HC OXYGEN, UP TO 24 HOURS

## 2021-04-07 PROCEDURE — 97161 PT EVAL LOW COMPLEX 20 MIN: CPT

## 2021-04-07 PROCEDURE — 80048 BASIC METABOLIC PNL TOTAL CA: CPT

## 2021-04-07 PROCEDURE — 27000646 HC AEROBIKA DEVICE

## 2021-04-07 PROCEDURE — 63600175 PHARM REV CODE 636 W HCPCS

## 2021-04-07 PROCEDURE — 63600175 PHARM REV CODE 636 W HCPCS: Performed by: STUDENT IN AN ORGANIZED HEALTH CARE EDUCATION/TRAINING PROGRAM

## 2021-04-07 PROCEDURE — 83735 ASSAY OF MAGNESIUM: CPT | Performed by: STUDENT IN AN ORGANIZED HEALTH CARE EDUCATION/TRAINING PROGRAM

## 2021-04-07 PROCEDURE — 20600001 HC STEP DOWN PRIVATE ROOM

## 2021-04-07 PROCEDURE — 25000003 PHARM REV CODE 250: Performed by: NURSE PRACTITIONER

## 2021-04-07 PROCEDURE — 94761 N-INVAS EAR/PLS OXIMETRY MLT: CPT

## 2021-04-07 RX ORDER — OXYCODONE HYDROCHLORIDE 10 MG/1
10 TABLET ORAL EVERY 4 HOURS PRN
Status: DISCONTINUED | OUTPATIENT
Start: 2021-04-07 | End: 2021-04-09 | Stop reason: HOSPADM

## 2021-04-07 RX ORDER — TALC
6 POWDER (GRAM) TOPICAL NIGHTLY PRN
Status: DISCONTINUED | OUTPATIENT
Start: 2021-04-07 | End: 2021-04-09 | Stop reason: HOSPADM

## 2021-04-07 RX ORDER — ENOXAPARIN SODIUM 100 MG/ML
40 INJECTION SUBCUTANEOUS EVERY 24 HOURS
Status: DISCONTINUED | OUTPATIENT
Start: 2021-04-07 | End: 2021-04-09

## 2021-04-07 RX ORDER — CALCIUM GLUCONATE 94 MG/ML
1 INJECTION, SOLUTION INTRAVENOUS ONCE
Status: DISCONTINUED | OUTPATIENT
Start: 2021-04-07 | End: 2021-04-07 | Stop reason: ALTCHOICE

## 2021-04-07 RX ORDER — MUPIROCIN 20 MG/G
OINTMENT TOPICAL 2 TIMES DAILY
Status: DISCONTINUED | OUTPATIENT
Start: 2021-04-07 | End: 2021-04-09 | Stop reason: HOSPADM

## 2021-04-07 RX ORDER — OXYCODONE HYDROCHLORIDE 5 MG/1
5 TABLET ORAL EVERY 4 HOURS PRN
Status: DISCONTINUED | OUTPATIENT
Start: 2021-04-07 | End: 2021-04-09 | Stop reason: HOSPADM

## 2021-04-07 RX ORDER — HYDROXYZINE HYDROCHLORIDE 25 MG/1
25 TABLET, FILM COATED ORAL 3 TIMES DAILY PRN
Status: DISCONTINUED | OUTPATIENT
Start: 2021-04-07 | End: 2021-04-09 | Stop reason: HOSPADM

## 2021-04-07 RX ORDER — HYDROMORPHONE HYDROCHLORIDE 1 MG/ML
1 INJECTION, SOLUTION INTRAMUSCULAR; INTRAVENOUS; SUBCUTANEOUS
Status: DISCONTINUED | OUTPATIENT
Start: 2021-04-07 | End: 2021-04-09 | Stop reason: HOSPADM

## 2021-04-07 RX ADMIN — ENOXAPARIN SODIUM 40 MG: 40 INJECTION SUBCUTANEOUS at 04:04

## 2021-04-07 RX ADMIN — LEVALBUTEROL HYDROCHLORIDE 0.63 MG: 0.63 SOLUTION RESPIRATORY (INHALATION) at 07:04

## 2021-04-07 RX ADMIN — HYDROXYZINE HYDROCHLORIDE 25 MG: 25 TABLET, FILM COATED ORAL at 02:04

## 2021-04-07 RX ADMIN — KETOROLAC TROMETHAMINE 15 MG: 30 INJECTION, SOLUTION INTRAMUSCULAR at 06:04

## 2021-04-07 RX ADMIN — AMLODIPINE BESYLATE 5 MG: 5 TABLET ORAL at 08:04

## 2021-04-07 RX ADMIN — Medication: at 12:04

## 2021-04-07 RX ADMIN — OXYCODONE HYDROCHLORIDE 10 MG: 10 TABLET ORAL at 10:04

## 2021-04-07 RX ADMIN — GABAPENTIN 100 MG: 100 CAPSULE ORAL at 02:04

## 2021-04-07 RX ADMIN — GABAPENTIN 100 MG: 100 CAPSULE ORAL at 08:04

## 2021-04-07 RX ADMIN — DOCUSATE SODIUM 100 MG: 100 CAPSULE, LIQUID FILLED ORAL at 08:04

## 2021-04-07 RX ADMIN — METOPROLOL SUCCINATE 100 MG: 100 TABLET, EXTENDED RELEASE ORAL at 08:04

## 2021-04-07 RX ADMIN — LEVOTHYROXINE SODIUM 25 MCG: 25 TABLET ORAL at 05:04

## 2021-04-07 RX ADMIN — LEVALBUTEROL HYDROCHLORIDE 0.63 MG: 0.63 SOLUTION RESPIRATORY (INHALATION) at 08:04

## 2021-04-07 RX ADMIN — OXYCODONE HYDROCHLORIDE 10 MG: 10 TABLET ORAL at 04:04

## 2021-04-07 RX ADMIN — MUPIROCIN: 20 OINTMENT TOPICAL at 08:04

## 2021-04-07 RX ADMIN — KETOROLAC TROMETHAMINE 15 MG: 30 INJECTION, SOLUTION INTRAMUSCULAR at 12:04

## 2021-04-07 RX ADMIN — LEVALBUTEROL HYDROCHLORIDE 0.63 MG: 0.63 SOLUTION RESPIRATORY (INHALATION) at 01:04

## 2021-04-07 RX ADMIN — KETOROLAC TROMETHAMINE 15 MG: 30 INJECTION, SOLUTION INTRAMUSCULAR at 05:04

## 2021-04-07 RX ADMIN — CALCIUM GLUCONATE 1000 MG: 98 INJECTION, SOLUTION INTRAVENOUS at 12:04

## 2021-04-07 RX ADMIN — MUPIROCIN: 20 OINTMENT TOPICAL at 12:04

## 2021-04-07 RX ADMIN — ESCITALOPRAM OXALATE 10 MG: 10 TABLET ORAL at 08:04

## 2021-04-08 LAB
ALBUMIN SERPL BCP-MCNC: 2.8 G/DL (ref 3.5–5.2)
ALP SERPL-CCNC: 91 U/L (ref 55–135)
ALT SERPL W/O P-5'-P-CCNC: 421 U/L (ref 10–44)
ANION GAP SERPL CALC-SCNC: 6 MMOL/L (ref 8–16)
AST SERPL-CCNC: 429 U/L (ref 10–40)
BASOPHILS # BLD AUTO: 0.04 K/UL (ref 0–0.2)
BASOPHILS NFR BLD: 0.5 % (ref 0–1.9)
BILIRUB SERPL-MCNC: 0.6 MG/DL (ref 0.1–1)
BUN SERPL-MCNC: 21 MG/DL (ref 6–20)
CALCIUM SERPL-MCNC: 8.2 MG/DL (ref 8.7–10.5)
CHLORIDE SERPL-SCNC: 109 MMOL/L (ref 95–110)
CO2 SERPL-SCNC: 22 MMOL/L (ref 23–29)
CREAT SERPL-MCNC: 0.8 MG/DL (ref 0.5–1.4)
DIFFERENTIAL METHOD: ABNORMAL
EOSINOPHIL # BLD AUTO: 0.1 K/UL (ref 0–0.5)
EOSINOPHIL NFR BLD: 1.4 % (ref 0–8)
ERYTHROCYTE [DISTWIDTH] IN BLOOD BY AUTOMATED COUNT: 16.1 % (ref 11.5–14.5)
EST. GFR  (AFRICAN AMERICAN): >60 ML/MIN/1.73 M^2
EST. GFR  (NON AFRICAN AMERICAN): >60 ML/MIN/1.73 M^2
GLUCOSE SERPL-MCNC: 101 MG/DL (ref 70–110)
HCT VFR BLD AUTO: 28.8 % (ref 37–48.5)
HGB BLD-MCNC: 9.1 G/DL (ref 12–16)
IMM GRANULOCYTES # BLD AUTO: 0.03 K/UL (ref 0–0.04)
IMM GRANULOCYTES NFR BLD AUTO: 0.4 % (ref 0–0.5)
LYMPHOCYTES # BLD AUTO: 0.6 K/UL (ref 1–4.8)
LYMPHOCYTES NFR BLD: 7.4 % (ref 18–48)
MAGNESIUM SERPL-MCNC: 1.9 MG/DL (ref 1.6–2.6)
MCH RBC QN AUTO: 27.8 PG (ref 27–31)
MCHC RBC AUTO-ENTMCNC: 31.6 G/DL (ref 32–36)
MCV RBC AUTO: 88 FL (ref 82–98)
MONOCYTES # BLD AUTO: 0.8 K/UL (ref 0.3–1)
MONOCYTES NFR BLD: 9.9 % (ref 4–15)
NEUTROPHILS # BLD AUTO: 6.7 K/UL (ref 1.8–7.7)
NEUTROPHILS NFR BLD: 80.4 % (ref 38–73)
NRBC BLD-RTO: 0 /100 WBC
PHOSPHATE SERPL-MCNC: 3.2 MG/DL (ref 2.7–4.5)
PLATELET # BLD AUTO: 145 K/UL (ref 150–450)
PMV BLD AUTO: 9.5 FL (ref 9.2–12.9)
POCT GLUCOSE: 106 MG/DL (ref 70–110)
POCT GLUCOSE: 117 MG/DL (ref 70–110)
POCT GLUCOSE: 91 MG/DL (ref 70–110)
POTASSIUM SERPL-SCNC: 4.7 MMOL/L (ref 3.5–5.1)
PROT SERPL-MCNC: 5 G/DL (ref 6–8.4)
RBC # BLD AUTO: 3.27 M/UL (ref 4–5.4)
SODIUM SERPL-SCNC: 137 MMOL/L (ref 136–145)
WBC # BLD AUTO: 8.36 K/UL (ref 3.9–12.7)

## 2021-04-08 PROCEDURE — 97530 THERAPEUTIC ACTIVITIES: CPT

## 2021-04-08 PROCEDURE — 83735 ASSAY OF MAGNESIUM: CPT | Performed by: STUDENT IN AN ORGANIZED HEALTH CARE EDUCATION/TRAINING PROGRAM

## 2021-04-08 PROCEDURE — 25000242 PHARM REV CODE 250 ALT 637 W/ HCPCS: Performed by: STUDENT IN AN ORGANIZED HEALTH CARE EDUCATION/TRAINING PROGRAM

## 2021-04-08 PROCEDURE — 63600175 PHARM REV CODE 636 W HCPCS: Performed by: STUDENT IN AN ORGANIZED HEALTH CARE EDUCATION/TRAINING PROGRAM

## 2021-04-08 PROCEDURE — 27000646 HC AEROBIKA DEVICE

## 2021-04-08 PROCEDURE — 80053 COMPREHEN METABOLIC PANEL: CPT | Performed by: STUDENT IN AN ORGANIZED HEALTH CARE EDUCATION/TRAINING PROGRAM

## 2021-04-08 PROCEDURE — 20600001 HC STEP DOWN PRIVATE ROOM

## 2021-04-08 PROCEDURE — 99900035 HC TECH TIME PER 15 MIN (STAT)

## 2021-04-08 PROCEDURE — 63600175 PHARM REV CODE 636 W HCPCS: Performed by: NURSE PRACTITIONER

## 2021-04-08 PROCEDURE — 94640 AIRWAY INHALATION TREATMENT: CPT

## 2021-04-08 PROCEDURE — 25000003 PHARM REV CODE 250: Performed by: NURSE PRACTITIONER

## 2021-04-08 PROCEDURE — 25000003 PHARM REV CODE 250: Performed by: STUDENT IN AN ORGANIZED HEALTH CARE EDUCATION/TRAINING PROGRAM

## 2021-04-08 PROCEDURE — 85025 COMPLETE CBC W/AUTO DIFF WBC: CPT | Performed by: STUDENT IN AN ORGANIZED HEALTH CARE EDUCATION/TRAINING PROGRAM

## 2021-04-08 PROCEDURE — 94664 DEMO&/EVAL PT USE INHALER: CPT

## 2021-04-08 PROCEDURE — 94761 N-INVAS EAR/PLS OXIMETRY MLT: CPT

## 2021-04-08 PROCEDURE — 63600175 PHARM REV CODE 636 W HCPCS: Performed by: SURGERY

## 2021-04-08 PROCEDURE — 84100 ASSAY OF PHOSPHORUS: CPT | Performed by: STUDENT IN AN ORGANIZED HEALTH CARE EDUCATION/TRAINING PROGRAM

## 2021-04-08 PROCEDURE — 25000003 PHARM REV CODE 250: Performed by: SURGERY

## 2021-04-08 PROCEDURE — 94645 CONT INHLJ TX EACH ADDL HOUR: CPT

## 2021-04-08 PROCEDURE — 36415 COLL VENOUS BLD VENIPUNCTURE: CPT | Performed by: STUDENT IN AN ORGANIZED HEALTH CARE EDUCATION/TRAINING PROGRAM

## 2021-04-08 RX ORDER — ENOXAPARIN SODIUM 100 MG/ML
40 INJECTION SUBCUTANEOUS DAILY
Qty: 5.6 ML | Refills: 0 | Status: SHIPPED | OUTPATIENT
Start: 2021-04-08 | End: 2021-04-23

## 2021-04-08 RX ORDER — DIPHENHYDRAMINE HCL 25 MG
25 CAPSULE ORAL NIGHTLY
Status: DISCONTINUED | OUTPATIENT
Start: 2021-04-08 | End: 2021-04-09 | Stop reason: HOSPADM

## 2021-04-08 RX ORDER — POLYETHYLENE GLYCOL 3350 17 G/17G
17 POWDER, FOR SOLUTION ORAL 2 TIMES DAILY
Status: DISCONTINUED | OUTPATIENT
Start: 2021-04-08 | End: 2021-04-09 | Stop reason: HOSPADM

## 2021-04-08 RX ADMIN — DOCUSATE SODIUM 100 MG: 100 CAPSULE, LIQUID FILLED ORAL at 08:04

## 2021-04-08 RX ADMIN — ENOXAPARIN SODIUM 40 MG: 40 INJECTION SUBCUTANEOUS at 05:04

## 2021-04-08 RX ADMIN — KETOROLAC TROMETHAMINE 15 MG: 30 INJECTION, SOLUTION INTRAMUSCULAR at 05:04

## 2021-04-08 RX ADMIN — GABAPENTIN 100 MG: 100 CAPSULE ORAL at 08:04

## 2021-04-08 RX ADMIN — OXYCODONE HYDROCHLORIDE 10 MG: 10 TABLET ORAL at 02:04

## 2021-04-08 RX ADMIN — METOPROLOL SUCCINATE 100 MG: 100 TABLET, EXTENDED RELEASE ORAL at 08:04

## 2021-04-08 RX ADMIN — OXYCODONE HYDROCHLORIDE 10 MG: 10 TABLET ORAL at 04:04

## 2021-04-08 RX ADMIN — LEVOTHYROXINE SODIUM 25 MCG: 25 TABLET ORAL at 05:04

## 2021-04-08 RX ADMIN — GABAPENTIN 100 MG: 100 CAPSULE ORAL at 02:04

## 2021-04-08 RX ADMIN — ESCITALOPRAM OXALATE 10 MG: 10 TABLET ORAL at 08:04

## 2021-04-08 RX ADMIN — DIPHENHYDRAMINE HYDROCHLORIDE 25 MG: 25 CAPSULE ORAL at 08:04

## 2021-04-08 RX ADMIN — MUPIROCIN: 20 OINTMENT TOPICAL at 08:04

## 2021-04-08 RX ADMIN — OXYCODONE HYDROCHLORIDE 10 MG: 10 TABLET ORAL at 06:04

## 2021-04-08 RX ADMIN — POLYETHYLENE GLYCOL 3350 17 G: 17 POWDER, FOR SOLUTION ORAL at 08:04

## 2021-04-08 RX ADMIN — MUPIROCIN: 20 OINTMENT TOPICAL at 02:04

## 2021-04-08 RX ADMIN — POLYETHYLENE GLYCOL 3350 17 G: 17 POWDER, FOR SOLUTION ORAL at 10:04

## 2021-04-08 RX ADMIN — LEVALBUTEROL HYDROCHLORIDE 0.63 MG: 0.63 SOLUTION RESPIRATORY (INHALATION) at 07:04

## 2021-04-08 RX ADMIN — KETOROLAC TROMETHAMINE 15 MG: 30 INJECTION, SOLUTION INTRAMUSCULAR at 11:04

## 2021-04-08 RX ADMIN — HYDROMORPHONE HYDROCHLORIDE 1 MG: 1 INJECTION, SOLUTION INTRAMUSCULAR; INTRAVENOUS; SUBCUTANEOUS at 01:04

## 2021-04-08 RX ADMIN — KETOROLAC TROMETHAMINE 15 MG: 30 INJECTION, SOLUTION INTRAMUSCULAR at 12:04

## 2021-04-08 RX ADMIN — LEVALBUTEROL HYDROCHLORIDE 0.63 MG: 0.63 SOLUTION RESPIRATORY (INHALATION) at 01:04

## 2021-04-09 VITALS
RESPIRATION RATE: 20 BRPM | HEIGHT: 65 IN | WEIGHT: 208.31 LBS | SYSTOLIC BLOOD PRESSURE: 167 MMHG | OXYGEN SATURATION: 95 % | TEMPERATURE: 99 F | DIASTOLIC BLOOD PRESSURE: 85 MMHG | HEART RATE: 78 BPM | BODY MASS INDEX: 34.71 KG/M2

## 2021-04-09 LAB
ALBUMIN SERPL BCP-MCNC: 2.9 G/DL (ref 3.5–5.2)
ALP SERPL-CCNC: 125 U/L (ref 55–135)
ALT SERPL W/O P-5'-P-CCNC: 332 U/L (ref 10–44)
ANION GAP SERPL CALC-SCNC: 8 MMOL/L (ref 8–16)
AST SERPL-CCNC: 271 U/L (ref 10–40)
BASOPHILS # BLD AUTO: 0.05 K/UL (ref 0–0.2)
BASOPHILS NFR BLD: 0.6 % (ref 0–1.9)
BILIRUB SERPL-MCNC: 0.7 MG/DL (ref 0.1–1)
BUN SERPL-MCNC: 17 MG/DL (ref 6–20)
CALCIUM SERPL-MCNC: 8.5 MG/DL (ref 8.7–10.5)
CHLORIDE SERPL-SCNC: 109 MMOL/L (ref 95–110)
CO2 SERPL-SCNC: 22 MMOL/L (ref 23–29)
CREAT SERPL-MCNC: 0.7 MG/DL (ref 0.5–1.4)
DIFFERENTIAL METHOD: ABNORMAL
EOSINOPHIL # BLD AUTO: 0.2 K/UL (ref 0–0.5)
EOSINOPHIL NFR BLD: 2.4 % (ref 0–8)
ERYTHROCYTE [DISTWIDTH] IN BLOOD BY AUTOMATED COUNT: 15.8 % (ref 11.5–14.5)
EST. GFR  (AFRICAN AMERICAN): >60 ML/MIN/1.73 M^2
EST. GFR  (NON AFRICAN AMERICAN): >60 ML/MIN/1.73 M^2
GLUCOSE SERPL-MCNC: 99 MG/DL (ref 70–110)
HCT VFR BLD AUTO: 31.1 % (ref 37–48.5)
HGB BLD-MCNC: 9.9 G/DL (ref 12–16)
IMM GRANULOCYTES # BLD AUTO: 0.05 K/UL (ref 0–0.04)
IMM GRANULOCYTES NFR BLD AUTO: 0.6 % (ref 0–0.5)
LYMPHOCYTES # BLD AUTO: 0.6 K/UL (ref 1–4.8)
LYMPHOCYTES NFR BLD: 7.8 % (ref 18–48)
MAGNESIUM SERPL-MCNC: 1.9 MG/DL (ref 1.6–2.6)
MCH RBC QN AUTO: 27.2 PG (ref 27–31)
MCHC RBC AUTO-ENTMCNC: 31.8 G/DL (ref 32–36)
MCV RBC AUTO: 85 FL (ref 82–98)
MONOCYTES # BLD AUTO: 0.7 K/UL (ref 0.3–1)
MONOCYTES NFR BLD: 8.3 % (ref 4–15)
NEUTROPHILS # BLD AUTO: 6.4 K/UL (ref 1.8–7.7)
NEUTROPHILS NFR BLD: 80.3 % (ref 38–73)
NRBC BLD-RTO: 0 /100 WBC
PHOSPHATE SERPL-MCNC: 2.7 MG/DL (ref 2.7–4.5)
PLATELET # BLD AUTO: 156 K/UL (ref 150–450)
PMV BLD AUTO: 9.7 FL (ref 9.2–12.9)
POCT GLUCOSE: 97 MG/DL (ref 70–110)
POTASSIUM SERPL-SCNC: 4.2 MMOL/L (ref 3.5–5.1)
PROT SERPL-MCNC: 5.6 G/DL (ref 6–8.4)
RBC # BLD AUTO: 3.64 M/UL (ref 4–5.4)
SODIUM SERPL-SCNC: 139 MMOL/L (ref 136–145)
WBC # BLD AUTO: 7.93 K/UL (ref 3.9–12.7)

## 2021-04-09 PROCEDURE — 25000003 PHARM REV CODE 250: Performed by: NURSE PRACTITIONER

## 2021-04-09 PROCEDURE — 63600175 PHARM REV CODE 636 W HCPCS: Performed by: STUDENT IN AN ORGANIZED HEALTH CARE EDUCATION/TRAINING PROGRAM

## 2021-04-09 PROCEDURE — 36415 COLL VENOUS BLD VENIPUNCTURE: CPT | Performed by: STUDENT IN AN ORGANIZED HEALTH CARE EDUCATION/TRAINING PROGRAM

## 2021-04-09 PROCEDURE — 83735 ASSAY OF MAGNESIUM: CPT | Performed by: STUDENT IN AN ORGANIZED HEALTH CARE EDUCATION/TRAINING PROGRAM

## 2021-04-09 PROCEDURE — 97116 GAIT TRAINING THERAPY: CPT | Mod: CQ

## 2021-04-09 PROCEDURE — 25000003 PHARM REV CODE 250: Performed by: STUDENT IN AN ORGANIZED HEALTH CARE EDUCATION/TRAINING PROGRAM

## 2021-04-09 PROCEDURE — 80053 COMPREHEN METABOLIC PANEL: CPT | Performed by: STUDENT IN AN ORGANIZED HEALTH CARE EDUCATION/TRAINING PROGRAM

## 2021-04-09 PROCEDURE — 84100 ASSAY OF PHOSPHORUS: CPT | Performed by: STUDENT IN AN ORGANIZED HEALTH CARE EDUCATION/TRAINING PROGRAM

## 2021-04-09 PROCEDURE — 85025 COMPLETE CBC W/AUTO DIFF WBC: CPT | Performed by: STUDENT IN AN ORGANIZED HEALTH CARE EDUCATION/TRAINING PROGRAM

## 2021-04-09 RX ORDER — OXYCODONE HYDROCHLORIDE 5 MG/1
5 TABLET ORAL EVERY 6 HOURS PRN
Qty: 30 TABLET | Refills: 0 | Status: SHIPPED | OUTPATIENT
Start: 2021-04-09 | End: 2021-08-13 | Stop reason: CLARIF

## 2021-04-09 RX ORDER — ENOXAPARIN SODIUM 100 MG/ML
40 INJECTION SUBCUTANEOUS EVERY 24 HOURS
Status: DISCONTINUED | OUTPATIENT
Start: 2021-04-09 | End: 2021-04-09 | Stop reason: HOSPADM

## 2021-04-09 RX ORDER — OXYCODONE HYDROCHLORIDE 5 MG/1
5 TABLET ORAL EVERY 6 HOURS PRN
Qty: 30 TABLET | Refills: 0 | Status: SHIPPED | OUTPATIENT
Start: 2021-04-09 | End: 2021-04-09 | Stop reason: SDUPTHER

## 2021-04-09 RX ADMIN — DOCUSATE SODIUM 100 MG: 100 CAPSULE, LIQUID FILLED ORAL at 09:04

## 2021-04-09 RX ADMIN — LEVOTHYROXINE SODIUM 25 MCG: 25 TABLET ORAL at 06:04

## 2021-04-09 RX ADMIN — POLYETHYLENE GLYCOL 3350 17 G: 17 POWDER, FOR SOLUTION ORAL at 09:04

## 2021-04-09 RX ADMIN — KETOROLAC TROMETHAMINE 15 MG: 30 INJECTION, SOLUTION INTRAMUSCULAR at 12:04

## 2021-04-09 RX ADMIN — MUPIROCIN: 20 OINTMENT TOPICAL at 09:04

## 2021-04-09 RX ADMIN — KETOROLAC TROMETHAMINE 15 MG: 30 INJECTION, SOLUTION INTRAMUSCULAR at 06:04

## 2021-04-09 RX ADMIN — GABAPENTIN 100 MG: 100 CAPSULE ORAL at 09:04

## 2021-04-09 RX ADMIN — AMLODIPINE BESYLATE 5 MG: 5 TABLET ORAL at 09:04

## 2021-04-10 LAB
BLD PROD TYP BPU: NORMAL
BLOOD UNIT EXPIRATION DATE: NORMAL
BLOOD UNIT TYPE CODE: 600
BLOOD UNIT TYPE: NORMAL
CODING SYSTEM: NORMAL
DISPENSE STATUS: NORMAL
NUM UNITS TRANS PACKED RBC: NORMAL

## 2021-04-10 PROCEDURE — G0180 MD CERTIFICATION HHA PATIENT: HCPCS | Mod: ,,, | Performed by: SURGERY

## 2021-04-10 PROCEDURE — G0180 PR HOME HEALTH MD CERTIFICATION: ICD-10-PCS | Mod: ,,, | Performed by: SURGERY

## 2021-04-16 ENCOUNTER — DOCUMENT SCAN (OUTPATIENT)
Dept: HOME HEALTH SERVICES | Facility: HOSPITAL | Age: 56
End: 2021-04-16
Payer: COMMERCIAL

## 2021-04-19 ENCOUNTER — OFFICE VISIT (OUTPATIENT)
Dept: SURGERY | Facility: CLINIC | Age: 56
End: 2021-04-19
Payer: COMMERCIAL

## 2021-04-19 VITALS
TEMPERATURE: 98 F | HEIGHT: 65 IN | WEIGHT: 196.63 LBS | BODY MASS INDEX: 32.76 KG/M2 | HEART RATE: 94 BPM | SYSTOLIC BLOOD PRESSURE: 162 MMHG | OXYGEN SATURATION: 99 % | DIASTOLIC BLOOD PRESSURE: 74 MMHG

## 2021-04-19 DIAGNOSIS — C18.9 COLON CANCER METASTASIZED TO LIVER: Primary | ICD-10-CM

## 2021-04-19 DIAGNOSIS — C78.7 COLON CANCER METASTASIZED TO LIVER: Primary | ICD-10-CM

## 2021-04-19 PROCEDURE — 99024 POSTOP FOLLOW-UP VISIT: CPT | Mod: S$GLB,,, | Performed by: SURGERY

## 2021-04-19 PROCEDURE — 1126F AMNT PAIN NOTED NONE PRSNT: CPT | Mod: S$GLB,,, | Performed by: SURGERY

## 2021-04-19 PROCEDURE — 99999 PR PBB SHADOW E&M-EST. PATIENT-LVL III: CPT | Mod: PBBFAC,,, | Performed by: SURGERY

## 2021-04-19 PROCEDURE — 3008F PR BODY MASS INDEX (BMI) DOCUMENTED: ICD-10-PCS | Mod: CPTII,S$GLB,, | Performed by: SURGERY

## 2021-04-19 PROCEDURE — 99999 PR PBB SHADOW E&M-EST. PATIENT-LVL III: ICD-10-PCS | Mod: PBBFAC,,, | Performed by: SURGERY

## 2021-04-19 PROCEDURE — 99024 PR POST-OP FOLLOW-UP VISIT: ICD-10-PCS | Mod: S$GLB,,, | Performed by: SURGERY

## 2021-04-19 PROCEDURE — 1126F PR PAIN SEVERITY QUANTIFIED, NO PAIN PRESENT: ICD-10-PCS | Mod: S$GLB,,, | Performed by: SURGERY

## 2021-04-19 PROCEDURE — 3008F BODY MASS INDEX DOCD: CPT | Mod: CPTII,S$GLB,, | Performed by: SURGERY

## 2021-04-20 ENCOUNTER — PATIENT MESSAGE (OUTPATIENT)
Dept: SURGERY | Facility: CLINIC | Age: 56
End: 2021-04-20

## 2021-04-21 ENCOUNTER — TELEPHONE (OUTPATIENT)
Dept: SURGERY | Facility: CLINIC | Age: 56
End: 2021-04-21

## 2021-04-22 ENCOUNTER — EXTERNAL HOME HEALTH (OUTPATIENT)
Dept: HOME HEALTH SERVICES | Facility: HOSPITAL | Age: 56
End: 2021-04-22
Payer: COMMERCIAL

## 2021-05-06 ENCOUNTER — PATIENT MESSAGE (OUTPATIENT)
Dept: RESEARCH | Facility: HOSPITAL | Age: 56
End: 2021-05-06

## 2021-05-21 ENCOUNTER — OFFICE VISIT (OUTPATIENT)
Dept: SURGERY | Facility: CLINIC | Age: 56
End: 2021-05-21
Payer: COMMERCIAL

## 2021-05-21 VITALS
HEART RATE: 86 BPM | SYSTOLIC BLOOD PRESSURE: 109 MMHG | HEIGHT: 65 IN | DIASTOLIC BLOOD PRESSURE: 70 MMHG | BODY MASS INDEX: 31.22 KG/M2 | WEIGHT: 187.38 LBS

## 2021-05-21 DIAGNOSIS — C20 RECTAL CANCER: Primary | ICD-10-CM

## 2021-05-21 DIAGNOSIS — N82.3 RECTOVAGINAL FISTULA: ICD-10-CM

## 2021-05-21 PROCEDURE — 99999 PR PBB SHADOW E&M-EST. PATIENT-LVL V: ICD-10-PCS | Mod: PBBFAC,,, | Performed by: COLON & RECTAL SURGERY

## 2021-05-21 PROCEDURE — 99215 PR OFFICE/OUTPT VISIT, EST, LEVL V, 40-54 MIN: ICD-10-PCS | Mod: S$GLB,,, | Performed by: COLON & RECTAL SURGERY

## 2021-05-21 PROCEDURE — 99215 OFFICE O/P EST HI 40 MIN: CPT | Mod: S$GLB,,, | Performed by: COLON & RECTAL SURGERY

## 2021-05-21 PROCEDURE — 1126F PR PAIN SEVERITY QUANTIFIED, NO PAIN PRESENT: ICD-10-PCS | Mod: S$GLB,,, | Performed by: COLON & RECTAL SURGERY

## 2021-05-21 PROCEDURE — 3008F BODY MASS INDEX DOCD: CPT | Mod: CPTII,S$GLB,, | Performed by: COLON & RECTAL SURGERY

## 2021-05-21 PROCEDURE — 99999 PR PBB SHADOW E&M-EST. PATIENT-LVL V: CPT | Mod: PBBFAC,,, | Performed by: COLON & RECTAL SURGERY

## 2021-05-21 PROCEDURE — 3008F PR BODY MASS INDEX (BMI) DOCUMENTED: ICD-10-PCS | Mod: CPTII,S$GLB,, | Performed by: COLON & RECTAL SURGERY

## 2021-05-21 PROCEDURE — 1126F AMNT PAIN NOTED NONE PRSNT: CPT | Mod: S$GLB,,, | Performed by: COLON & RECTAL SURGERY

## 2021-06-03 ENCOUNTER — OFFICE VISIT (OUTPATIENT)
Dept: PLASTIC SURGERY | Facility: CLINIC | Age: 56
End: 2021-06-03
Payer: COMMERCIAL

## 2021-06-03 VITALS — WEIGHT: 187.38 LBS | BODY MASS INDEX: 31.22 KG/M2 | HEIGHT: 65 IN

## 2021-06-03 DIAGNOSIS — C20 RECTAL CANCER: Primary | ICD-10-CM

## 2021-06-03 DIAGNOSIS — C18.9 COLON CANCER METASTASIZED TO LIVER: Primary | ICD-10-CM

## 2021-06-03 DIAGNOSIS — N82.3 RECTOVAGINAL FISTULA: ICD-10-CM

## 2021-06-03 DIAGNOSIS — C78.7 COLON CANCER METASTASIZED TO LIVER: Primary | ICD-10-CM

## 2021-06-03 DIAGNOSIS — C20 RECTAL CANCER: ICD-10-CM

## 2021-06-03 PROCEDURE — 3008F BODY MASS INDEX DOCD: CPT | Mod: CPTII,S$GLB,, | Performed by: SURGERY

## 2021-06-03 PROCEDURE — 3008F PR BODY MASS INDEX (BMI) DOCUMENTED: ICD-10-PCS | Mod: CPTII,S$GLB,, | Performed by: SURGERY

## 2021-06-03 PROCEDURE — 99203 PR OFFICE/OUTPT VISIT, NEW, LEVL III, 30-44 MIN: ICD-10-PCS | Mod: S$GLB,,, | Performed by: SURGERY

## 2021-06-03 PROCEDURE — 99999 PR PBB SHADOW E&M-EST. PATIENT-LVL IV: ICD-10-PCS | Mod: PBBFAC,,, | Performed by: SURGERY

## 2021-06-03 PROCEDURE — 1126F PR PAIN SEVERITY QUANTIFIED, NO PAIN PRESENT: ICD-10-PCS | Mod: S$GLB,,, | Performed by: SURGERY

## 2021-06-03 PROCEDURE — 99203 OFFICE O/P NEW LOW 30 MIN: CPT | Mod: S$GLB,,, | Performed by: SURGERY

## 2021-06-03 PROCEDURE — 1126F AMNT PAIN NOTED NONE PRSNT: CPT | Mod: S$GLB,,, | Performed by: SURGERY

## 2021-06-03 PROCEDURE — 99999 PR PBB SHADOW E&M-EST. PATIENT-LVL IV: CPT | Mod: PBBFAC,,, | Performed by: SURGERY

## 2021-06-07 ENCOUNTER — TELEPHONE (OUTPATIENT)
Dept: SURGERY | Facility: CLINIC | Age: 56
End: 2021-06-07

## 2021-06-07 ENCOUNTER — TELEPHONE (OUTPATIENT)
Dept: PLASTIC SURGERY | Facility: CLINIC | Age: 56
End: 2021-06-07

## 2021-06-08 ENCOUNTER — TELEPHONE (OUTPATIENT)
Dept: SURGERY | Facility: CLINIC | Age: 56
End: 2021-06-08

## 2021-06-10 ENCOUNTER — TELEPHONE (OUTPATIENT)
Dept: SURGERY | Facility: CLINIC | Age: 56
End: 2021-06-10

## 2021-06-16 LAB
FINAL PATHOLOGIC DIAGNOSIS: NORMAL
GROSS: NORMAL
Lab: NORMAL
SUPPLEMENTAL DIAGNOSIS: NORMAL

## 2021-06-22 ENCOUNTER — TELEPHONE (OUTPATIENT)
Dept: GYNECOLOGIC ONCOLOGY | Facility: CLINIC | Age: 56
End: 2021-06-22

## 2021-06-28 ENCOUNTER — PATIENT MESSAGE (OUTPATIENT)
Dept: GYNECOLOGIC ONCOLOGY | Facility: CLINIC | Age: 56
End: 2021-06-28

## 2021-06-28 ENCOUNTER — TELEPHONE (OUTPATIENT)
Dept: GYNECOLOGIC ONCOLOGY | Facility: CLINIC | Age: 56
End: 2021-06-28

## 2021-06-29 ENCOUNTER — TELEPHONE (OUTPATIENT)
Dept: SURGERY | Facility: CLINIC | Age: 56
End: 2021-06-29

## 2021-06-29 ENCOUNTER — HOSPITAL ENCOUNTER (OUTPATIENT)
Dept: RADIOLOGY | Facility: HOSPITAL | Age: 56
Discharge: HOME | End: 2021-06-29
Attending: COLON & RECTAL SURGERY
Payer: COMMERCIAL

## 2021-06-29 ENCOUNTER — HOSPITAL ENCOUNTER (OUTPATIENT)
Dept: RADIOLOGY | Facility: HOSPITAL | Age: 56
Discharge: HOME | End: 2021-06-29
Attending: SURGERY
Payer: COMMERCIAL

## 2021-06-29 ENCOUNTER — INFUSION (OUTPATIENT)
Dept: INFECTIOUS DISEASES | Facility: HOSPITAL | Age: 56
End: 2021-06-29
Attending: INTERNAL MEDICINE
Payer: COMMERCIAL

## 2021-06-29 DIAGNOSIS — N82.3 RECTOVAGINAL FISTULA: ICD-10-CM

## 2021-06-29 DIAGNOSIS — C20 RECTAL CANCER: ICD-10-CM

## 2021-06-29 LAB
CREAT SERPL-MCNC: 0.5 MG/DL (ref 0.5–1.4)
SAMPLE: NORMAL

## 2021-06-29 PROCEDURE — 71260 CT CHEST ABDOMEN PELVIS WITH CONTRAST (XPD): ICD-10-PCS | Mod: 26,,, | Performed by: RADIOLOGY

## 2021-06-29 PROCEDURE — 71260 CT THORAX DX C+: CPT | Mod: 26,,, | Performed by: RADIOLOGY

## 2021-06-29 PROCEDURE — 74177 CT ABD & PELVIS W/CONTRAST: CPT | Mod: 26,,, | Performed by: RADIOLOGY

## 2021-06-29 PROCEDURE — 74174 CTA ABD&PLVS W/CONTRAST: CPT | Mod: TC

## 2021-06-29 PROCEDURE — 74174 CTA ABD&PLVS W/CONTRAST: CPT | Mod: 26,,, | Performed by: RADIOLOGY

## 2021-06-29 PROCEDURE — 74177 CT CHEST ABDOMEN PELVIS WITH CONTRAST (XPD): ICD-10-PCS | Mod: 26,,, | Performed by: RADIOLOGY

## 2021-06-29 PROCEDURE — 72195 MRI RECTAL CANCER WITHOUT CONTRAST: ICD-10-PCS | Mod: 26,,, | Performed by: RADIOLOGY

## 2021-06-29 PROCEDURE — 25500020 PHARM REV CODE 255: Performed by: COLON & RECTAL SURGERY

## 2021-06-29 PROCEDURE — 72195 MRI PELVIS W/O DYE: CPT | Mod: 26,,, | Performed by: RADIOLOGY

## 2021-06-29 PROCEDURE — 74177 CT ABD & PELVIS W/CONTRAST: CPT | Mod: TC,59

## 2021-06-29 PROCEDURE — 72195 MRI PELVIS W/O DYE: CPT | Mod: TC

## 2021-06-29 PROCEDURE — 74174 CTA ABDOMEN AND PELVIS: ICD-10-PCS | Mod: 26,,, | Performed by: RADIOLOGY

## 2021-06-29 RX ADMIN — IOHEXOL 100 ML: 350 INJECTION, SOLUTION INTRAVENOUS at 03:06

## 2021-07-06 DIAGNOSIS — N82.3 RECTOVAGINAL FISTULA: ICD-10-CM

## 2021-07-06 DIAGNOSIS — C20 RECTAL CANCER: Primary | ICD-10-CM

## 2021-07-07 ENCOUNTER — PATIENT MESSAGE (OUTPATIENT)
Dept: GYNECOLOGIC ONCOLOGY | Facility: CLINIC | Age: 56
End: 2021-07-07

## 2021-07-07 ENCOUNTER — TELEPHONE (OUTPATIENT)
Dept: GYNECOLOGIC ONCOLOGY | Facility: CLINIC | Age: 56
End: 2021-07-07

## 2021-07-08 ENCOUNTER — TELEPHONE (OUTPATIENT)
Dept: SURGERY | Facility: CLINIC | Age: 56
End: 2021-07-08

## 2021-07-08 RX ORDER — POLYETHYLENE GLYCOL 3350 17 G/17G
POWDER, FOR SOLUTION ORAL
Qty: 1 BOTTLE | Refills: 0 | Status: ON HOLD | OUTPATIENT
Start: 2021-07-08 | End: 2021-08-16

## 2021-07-08 RX ORDER — NEOMYCIN SULFATE 500 MG/1
TABLET ORAL
Qty: 6 TABLET | Refills: 0 | Status: ON HOLD | OUTPATIENT
Start: 2021-07-08 | End: 2021-08-16

## 2021-07-08 RX ORDER — METRONIDAZOLE 500 MG/1
500 TABLET ORAL 3 TIMES DAILY
Qty: 3 TABLET | Refills: 0 | Status: SHIPPED | OUTPATIENT
Start: 2021-07-08 | End: 2021-07-09

## 2021-07-22 ENCOUNTER — OFFICE VISIT (OUTPATIENT)
Dept: GYNECOLOGIC ONCOLOGY | Facility: CLINIC | Age: 56
End: 2021-07-22
Payer: COMMERCIAL

## 2021-07-22 DIAGNOSIS — N89.8 VAGINAL MASS: ICD-10-CM

## 2021-07-22 DIAGNOSIS — N82.3 RECTOVAGINAL FISTULA: ICD-10-CM

## 2021-07-22 DIAGNOSIS — C20 RECTAL CANCER: Primary | ICD-10-CM

## 2021-07-22 PROCEDURE — 3044F HG A1C LEVEL LT 7.0%: CPT | Mod: CPTII,95,, | Performed by: OBSTETRICS & GYNECOLOGY

## 2021-07-22 PROCEDURE — 99215 PR OFFICE/OUTPT VISIT, EST, LEVL V, 40-54 MIN: ICD-10-PCS | Mod: 95,,, | Performed by: OBSTETRICS & GYNECOLOGY

## 2021-07-22 PROCEDURE — 99215 OFFICE O/P EST HI 40 MIN: CPT | Mod: 95,,, | Performed by: OBSTETRICS & GYNECOLOGY

## 2021-07-22 PROCEDURE — 3044F PR MOST RECENT HEMOGLOBIN A1C LEVEL <7.0%: ICD-10-PCS | Mod: CPTII,95,, | Performed by: OBSTETRICS & GYNECOLOGY

## 2021-07-26 ENCOUNTER — TELEPHONE (OUTPATIENT)
Dept: SURGERY | Facility: CLINIC | Age: 56
End: 2021-07-26

## 2021-07-30 ENCOUNTER — DOCUMENTATION ONLY (OUTPATIENT)
Dept: SURGERY | Facility: CLINIC | Age: 56
End: 2021-07-30

## 2021-08-02 ENCOUNTER — DOCUMENTATION ONLY (OUTPATIENT)
Dept: HEMATOLOGY/ONCOLOGY | Facility: CLINIC | Age: 56
End: 2021-08-02

## 2021-08-03 ENCOUNTER — TELEPHONE (OUTPATIENT)
Dept: SURGERY | Facility: CLINIC | Age: 56
End: 2021-08-03

## 2021-08-03 DIAGNOSIS — Z01.818 PRE-OP TESTING: ICD-10-CM

## 2021-08-05 ENCOUNTER — TELEPHONE (OUTPATIENT)
Dept: SURGERY | Facility: CLINIC | Age: 56
End: 2021-08-05

## 2021-08-13 ENCOUNTER — TELEPHONE (OUTPATIENT)
Dept: SURGERY | Facility: CLINIC | Age: 56
End: 2021-08-13

## 2021-08-13 ENCOUNTER — HOSPITAL ENCOUNTER (OUTPATIENT)
Dept: PREADMISSION TESTING | Facility: HOSPITAL | Age: 56
Discharge: HOME | End: 2021-08-13
Attending: COLON & RECTAL SURGERY
Payer: COMMERCIAL

## 2021-08-13 DIAGNOSIS — Z01.818 PRE-OP TESTING: ICD-10-CM

## 2021-08-13 LAB — SARS-COV-2 RNA RESP QL NAA+PROBE: NOT DETECTED

## 2021-08-13 PROCEDURE — U0005 INFEC AGEN DETEC AMPLI PROBE: HCPCS | Performed by: COLON & RECTAL SURGERY

## 2021-08-13 PROCEDURE — U0003 INFECTIOUS AGENT DETECTION BY NUCLEIC ACID (DNA OR RNA); SEVERE ACUTE RESPIRATORY SYNDROME CORONAVIRUS 2 (SARS-COV-2) (CORONAVIRUS DISEASE [COVID-19]), AMPLIFIED PROBE TECHNIQUE, MAKING USE OF HIGH THROUGHPUT TECHNOLOGIES AS DESCRIBED BY CMS-2020-01-R: HCPCS | Mod: 91 | Performed by: COLON & RECTAL SURGERY

## 2021-08-13 RX ORDER — METRONIDAZOLE 500 MG/1
500 TABLET ORAL
Status: ON HOLD | COMMUNITY
End: 2021-08-16

## 2021-08-16 ENCOUNTER — ANESTHESIA EVENT (OUTPATIENT)
Dept: SURGERY | Facility: HOSPITAL | Age: 56
DRG: 330 | End: 2021-08-16
Payer: COMMERCIAL

## 2021-08-16 ENCOUNTER — HOSPITAL ENCOUNTER (INPATIENT)
Facility: HOSPITAL | Age: 56
LOS: 4 days | Discharge: HOME HEALTH | DRG: 330 | End: 2021-08-20
Attending: COLON & RECTAL SURGERY | Admitting: COLON & RECTAL SURGERY
Payer: COMMERCIAL

## 2021-08-16 ENCOUNTER — ANESTHESIA (OUTPATIENT)
Dept: SURGERY | Facility: HOSPITAL | Age: 56
DRG: 330 | End: 2021-08-16
Payer: COMMERCIAL

## 2021-08-16 DIAGNOSIS — C20 RECTAL CANCER: ICD-10-CM

## 2021-08-16 LAB
ABO + RH BLD: NORMAL
ANION GAP SERPL CALC-SCNC: 12 MMOL/L (ref 8–16)
BLD GP AB SCN CELLS X3 SERPL QL: NORMAL
BUN SERPL-MCNC: 18 MG/DL (ref 6–20)
CALCIUM SERPL-MCNC: 7.8 MG/DL (ref 8.7–10.5)
CHLORIDE SERPL-SCNC: 112 MMOL/L (ref 95–110)
CO2 SERPL-SCNC: 17 MMOL/L (ref 23–29)
CREAT SERPL-MCNC: 0.8 MG/DL (ref 0.5–1.4)
EST. GFR  (AFRICAN AMERICAN): >60 ML/MIN/1.73 M^2
EST. GFR  (NON AFRICAN AMERICAN): >60 ML/MIN/1.73 M^2
GLUCOSE SERPL-MCNC: 141 MG/DL (ref 70–110)
GLUCOSE SERPL-MCNC: 145 MG/DL (ref 70–110)
GLUCOSE SERPL-MCNC: 149 MG/DL (ref 70–110)
GLUCOSE SERPL-MCNC: 158 MG/DL (ref 70–110)
GLUCOSE SERPL-MCNC: 163 MG/DL (ref 70–110)
GLUCOSE SERPL-MCNC: 172 MG/DL (ref 70–110)
HCO3 UR-SCNC: 18.9 MMOL/L (ref 24–28)
HCO3 UR-SCNC: 19 MMOL/L (ref 24–28)
HCO3 UR-SCNC: 19.1 MMOL/L (ref 24–28)
HCO3 UR-SCNC: 19.4 MMOL/L (ref 24–28)
HCO3 UR-SCNC: 20.1 MMOL/L (ref 24–28)
HCT VFR BLD CALC: 25 %PCV (ref 36–54)
HCT VFR BLD CALC: 26 %PCV (ref 36–54)
HCT VFR BLD CALC: 27 %PCV (ref 36–54)
HCT VFR BLD CALC: 30 %PCV (ref 36–54)
HCT VFR BLD CALC: 30 %PCV (ref 36–54)
PCO2 BLDA: 32.6 MMHG (ref 35–45)
PCO2 BLDA: 35.6 MMHG (ref 35–45)
PCO2 BLDA: 36.2 MMHG (ref 35–45)
PCO2 BLDA: 37.8 MMHG (ref 35–45)
PCO2 BLDA: 38.1 MMHG (ref 35–45)
PH SMN: 7.31 [PH] (ref 7.35–7.45)
PH SMN: 7.33 [PH] (ref 7.35–7.45)
PH SMN: 7.38 [PH] (ref 7.35–7.45)
PO2 BLDA: 101 MMHG (ref 80–100)
PO2 BLDA: 109 MMHG (ref 80–100)
PO2 BLDA: 136 MMHG (ref 80–100)
PO2 BLDA: 92 MMHG (ref 80–100)
PO2 BLDA: 94 MMHG (ref 80–100)
POC BE: -6 MMOL/L
POC BE: -6 MMOL/L
POC BE: -7 MMOL/L
POC IONIZED CALCIUM: 1.09 MMOL/L (ref 1.06–1.42)
POC IONIZED CALCIUM: 1.1 MMOL/L (ref 1.06–1.42)
POC IONIZED CALCIUM: 1.1 MMOL/L (ref 1.06–1.42)
POC IONIZED CALCIUM: 1.16 MMOL/L (ref 1.06–1.42)
POC IONIZED CALCIUM: 1.21 MMOL/L (ref 1.06–1.42)
POC SATURATED O2: 96 % (ref 95–100)
POC SATURATED O2: 97 % (ref 95–100)
POC SATURATED O2: 97 % (ref 95–100)
POC SATURATED O2: 98 % (ref 95–100)
POC SATURATED O2: 99 % (ref 95–100)
POC TCO2: 20 MMOL/L (ref 23–27)
POC TCO2: 21 MMOL/L (ref 23–27)
POCT GLUCOSE: 119 MG/DL (ref 70–110)
POCT GLUCOSE: 138 MG/DL (ref 70–110)
POTASSIUM BLD-SCNC: 3.2 MMOL/L (ref 3.5–5.1)
POTASSIUM BLD-SCNC: 3.3 MMOL/L (ref 3.5–5.1)
POTASSIUM BLD-SCNC: 3.4 MMOL/L (ref 3.5–5.1)
POTASSIUM BLD-SCNC: 3.6 MMOL/L (ref 3.5–5.1)
POTASSIUM BLD-SCNC: 3.7 MMOL/L (ref 3.5–5.1)
POTASSIUM SERPL-SCNC: 4 MMOL/L (ref 3.5–5.1)
SAMPLE: ABNORMAL
SODIUM BLD-SCNC: 140 MMOL/L (ref 136–145)
SODIUM BLD-SCNC: 141 MMOL/L (ref 136–145)
SODIUM BLD-SCNC: 142 MMOL/L (ref 136–145)
SODIUM BLD-SCNC: 143 MMOL/L (ref 136–145)
SODIUM BLD-SCNC: 143 MMOL/L (ref 136–145)
SODIUM SERPL-SCNC: 141 MMOL/L (ref 136–145)

## 2021-08-16 PROCEDURE — 37000009 HC ANESTHESIA EA ADD 15 MINS: Performed by: COLON & RECTAL SURGERY

## 2021-08-16 PROCEDURE — 63600175 PHARM REV CODE 636 W HCPCS: Performed by: NURSE PRACTITIONER

## 2021-08-16 PROCEDURE — 25000003 PHARM REV CODE 250: Performed by: NURSE PRACTITIONER

## 2021-08-16 PROCEDURE — 82962 GLUCOSE BLOOD TEST: CPT | Performed by: COLON & RECTAL SURGERY

## 2021-08-16 PROCEDURE — 14000 PR ADJ TISS XFER TRUNK <10 SQCM: ICD-10-PCS | Mod: 51,,, | Performed by: SURGERY

## 2021-08-16 PROCEDURE — P9016 RBC LEUKOCYTES REDUCED: HCPCS | Performed by: COLON & RECTAL SURGERY

## 2021-08-16 PROCEDURE — 15734 PR MUSCLE-SKIN FLAP,TRUNK: ICD-10-PCS | Mod: ,,, | Performed by: SURGERY

## 2021-08-16 PROCEDURE — D9220A PRA ANESTHESIA: Mod: CRNA,,, | Performed by: NURSE ANESTHETIST, CERTIFIED REGISTERED

## 2021-08-16 PROCEDURE — 36620 INSERTION CATHETER ARTERY: CPT | Mod: 59,,, | Performed by: ANESTHESIOLOGY

## 2021-08-16 PROCEDURE — 27201423 OPTIME MED/SURG SUP & DEVICES STERILE SUPPLY: Performed by: COLON & RECTAL SURGERY

## 2021-08-16 PROCEDURE — 88309 PR  SURG PATH,LEVEL VI: ICD-10-PCS | Mod: 26,,, | Performed by: PATHOLOGY

## 2021-08-16 PROCEDURE — 63600175 PHARM REV CODE 636 W HCPCS: Performed by: STUDENT IN AN ORGANIZED HEALTH CARE EDUCATION/TRAINING PROGRAM

## 2021-08-16 PROCEDURE — C1751 CATH, INF, PER/CENT/MIDLINE: HCPCS | Performed by: ANESTHESIOLOGY

## 2021-08-16 PROCEDURE — P9045 ALBUMIN (HUMAN), 5%, 250 ML: HCPCS | Mod: JG | Performed by: NURSE ANESTHETIST, CERTIFIED REGISTERED

## 2021-08-16 PROCEDURE — S0030 INJECTION, METRONIDAZOLE: HCPCS | Performed by: NURSE PRACTITIONER

## 2021-08-16 PROCEDURE — 88305 TISSUE EXAM BY PATHOLOGIST: ICD-10-PCS | Mod: 26,,, | Performed by: PATHOLOGY

## 2021-08-16 PROCEDURE — 15734 PR MUSCLE-SKIN FLAP,TRUNK: ICD-10-PCS | Mod: 51,,, | Performed by: COLON & RECTAL SURGERY

## 2021-08-16 PROCEDURE — 27201037 HC PRESSURE MONITORING SET UP

## 2021-08-16 PROCEDURE — 25000003 PHARM REV CODE 250: Performed by: NURSE ANESTHETIST, CERTIFIED REGISTERED

## 2021-08-16 PROCEDURE — 71000039 HC RECOVERY, EACH ADD'L HOUR: Performed by: COLON & RECTAL SURGERY

## 2021-08-16 PROCEDURE — 97605 PR NEG PRESS WOUND THERAPY (NPWT) W/NON-DISPOSABLE WOUND VAC DEVICE (DME), <=50 CM: ICD-10-PCS | Mod: ,,, | Performed by: COLON & RECTAL SURGERY

## 2021-08-16 PROCEDURE — 88305 TISSUE EXAM BY PATHOLOGIST: CPT | Performed by: PATHOLOGY

## 2021-08-16 PROCEDURE — 94002 VENT MGMT INPAT INIT DAY: CPT

## 2021-08-16 PROCEDURE — 37000008 HC ANESTHESIA 1ST 15 MINUTES: Performed by: COLON & RECTAL SURGERY

## 2021-08-16 PROCEDURE — 20600001 HC STEP DOWN PRIVATE ROOM

## 2021-08-16 PROCEDURE — 58150 TOTAL HYSTERECTOMY: CPT | Mod: ,,, | Performed by: OBSTETRICS & GYNECOLOGY

## 2021-08-16 PROCEDURE — 58150 PR TOTAL ABDOM HYSTERECTOMY: ICD-10-PCS | Mod: ,,, | Performed by: OBSTETRICS & GYNECOLOGY

## 2021-08-16 PROCEDURE — 27200677 HC TRANSDUCER MONITOR KIT SINGLE: Performed by: ANESTHESIOLOGY

## 2021-08-16 PROCEDURE — 88305 TISSUE EXAM BY PATHOLOGIST: CPT | Mod: 26,,, | Performed by: PATHOLOGY

## 2021-08-16 PROCEDURE — 45110 PR PROCTECTOMY,AP RESECT+OSTOMY: ICD-10-PCS | Mod: ,,, | Performed by: COLON & RECTAL SURGERY

## 2021-08-16 PROCEDURE — 71000015 HC POSTOP RECOV 1ST HR: Performed by: COLON & RECTAL SURGERY

## 2021-08-16 PROCEDURE — 88309 TISSUE EXAM BY PATHOLOGIST: CPT | Mod: 26,,, | Performed by: PATHOLOGY

## 2021-08-16 PROCEDURE — 27100025 HC TUBING, SET FLUID WARMER: Performed by: ANESTHESIOLOGY

## 2021-08-16 PROCEDURE — 63600175 PHARM REV CODE 636 W HCPCS: Performed by: NURSE ANESTHETIST, CERTIFIED REGISTERED

## 2021-08-16 PROCEDURE — 44346 REVISION OF COLOSTOMY: CPT | Mod: 51,,, | Performed by: COLON & RECTAL SURGERY

## 2021-08-16 PROCEDURE — 25000003 PHARM REV CODE 250: Performed by: STUDENT IN AN ORGANIZED HEALTH CARE EDUCATION/TRAINING PROGRAM

## 2021-08-16 PROCEDURE — 80048 BASIC METABOLIC PNL TOTAL CA: CPT | Performed by: STUDENT IN AN ORGANIZED HEALTH CARE EDUCATION/TRAINING PROGRAM

## 2021-08-16 PROCEDURE — 97605 NEG PRS WND THER DME<=50SQCM: CPT | Mod: ,,, | Performed by: COLON & RECTAL SURGERY

## 2021-08-16 PROCEDURE — 88307 TISSUE EXAM BY PATHOLOGIST: CPT | Mod: 26,,, | Performed by: PATHOLOGY

## 2021-08-16 PROCEDURE — 94761 N-INVAS EAR/PLS OXIMETRY MLT: CPT

## 2021-08-16 PROCEDURE — 57106 VAGNC PRTL RMVL VAG WALL: CPT | Mod: 51,,, | Performed by: COLON & RECTAL SURGERY

## 2021-08-16 PROCEDURE — 71000033 HC RECOVERY, INTIAL HOUR: Performed by: COLON & RECTAL SURGERY

## 2021-08-16 PROCEDURE — 57106 PR REMOVE VAGINA WALL, PARTIAL: ICD-10-PCS | Mod: 51,,, | Performed by: COLON & RECTAL SURGERY

## 2021-08-16 PROCEDURE — 94799 UNLISTED PULMONARY SVC/PX: CPT

## 2021-08-16 PROCEDURE — 15734 MUSCLE-SKIN GRAFT TRUNK: CPT | Mod: ,,, | Performed by: SURGERY

## 2021-08-16 PROCEDURE — 88307 TISSUE EXAM BY PATHOLOGIST: CPT | Performed by: PATHOLOGY

## 2021-08-16 PROCEDURE — 36000709 HC OR TIME LEV III EA ADD 15 MIN: Performed by: COLON & RECTAL SURGERY

## 2021-08-16 PROCEDURE — 45110 REMOVAL OF RECTUM: CPT | Mod: ,,, | Performed by: COLON & RECTAL SURGERY

## 2021-08-16 PROCEDURE — 88307 PR  SURG PATH,LEVEL V: ICD-10-PCS | Mod: 26,,, | Performed by: PATHOLOGY

## 2021-08-16 PROCEDURE — 86920 COMPATIBILITY TEST SPIN: CPT | Performed by: COLON & RECTAL SURGERY

## 2021-08-16 PROCEDURE — 99900035 HC TECH TIME PER 15 MIN (STAT)

## 2021-08-16 PROCEDURE — D9220A PRA ANESTHESIA: ICD-10-PCS | Mod: CRNA,,, | Performed by: NURSE ANESTHETIST, CERTIFIED REGISTERED

## 2021-08-16 PROCEDURE — 36620 PR INSERT CATH,ART,PERCUT,SHORTTERM: ICD-10-PCS | Mod: 59,,, | Performed by: ANESTHESIOLOGY

## 2021-08-16 PROCEDURE — 15734 MUSCLE-SKIN GRAFT TRUNK: CPT | Mod: 51,,, | Performed by: COLON & RECTAL SURGERY

## 2021-08-16 PROCEDURE — 14000 TIS TRNFR TRUNK 10 SQ CM/<: CPT | Mod: 51,,, | Performed by: SURGERY

## 2021-08-16 PROCEDURE — D9220A PRA ANESTHESIA: ICD-10-PCS | Mod: ANES,,, | Performed by: ANESTHESIOLOGY

## 2021-08-16 PROCEDURE — S0030 INJECTION, METRONIDAZOLE: HCPCS | Performed by: NURSE ANESTHETIST, CERTIFIED REGISTERED

## 2021-08-16 PROCEDURE — 44346: ICD-10-PCS | Mod: 51,,, | Performed by: COLON & RECTAL SURGERY

## 2021-08-16 PROCEDURE — D9220A PRA ANESTHESIA: Mod: ANES,,, | Performed by: ANESTHESIOLOGY

## 2021-08-16 PROCEDURE — 86900 BLOOD TYPING SEROLOGIC ABO: CPT | Performed by: NURSE PRACTITIONER

## 2021-08-16 PROCEDURE — 36000708 HC OR TIME LEV III 1ST 15 MIN: Performed by: COLON & RECTAL SURGERY

## 2021-08-16 RX ORDER — LIDOCAINE HYDROCHLORIDE 20 MG/ML
INJECTION INTRAVENOUS
Status: DISCONTINUED | OUTPATIENT
Start: 2021-08-16 | End: 2021-08-16

## 2021-08-16 RX ORDER — ONDANSETRON 2 MG/ML
INJECTION INTRAMUSCULAR; INTRAVENOUS
Status: DISCONTINUED | OUTPATIENT
Start: 2021-08-16 | End: 2021-08-16

## 2021-08-16 RX ORDER — SODIUM CHLORIDE 0.9 % (FLUSH) 0.9 %
10 SYRINGE (ML) INJECTION
Status: DISCONTINUED | OUTPATIENT
Start: 2021-08-16 | End: 2021-08-20 | Stop reason: HOSPADM

## 2021-08-16 RX ORDER — NALOXONE HCL 0.4 MG/ML
0.02 VIAL (ML) INJECTION
Status: DISCONTINUED | OUTPATIENT
Start: 2021-08-16 | End: 2021-08-18

## 2021-08-16 RX ORDER — SODIUM BICARBONATE 1 MEQ/ML
SYRINGE (ML) INTRAVENOUS
Status: DISCONTINUED | OUTPATIENT
Start: 2021-08-16 | End: 2021-08-16

## 2021-08-16 RX ORDER — ONDANSETRON 8 MG/1
8 TABLET, ORALLY DISINTEGRATING ORAL EVERY 8 HOURS PRN
Status: DISCONTINUED | OUTPATIENT
Start: 2021-08-16 | End: 2021-08-20 | Stop reason: HOSPADM

## 2021-08-16 RX ORDER — HEPARIN SODIUM 5000 [USP'U]/ML
5000 INJECTION, SOLUTION INTRAVENOUS; SUBCUTANEOUS EVERY 8 HOURS
Status: COMPLETED | OUTPATIENT
Start: 2021-08-16 | End: 2021-08-16

## 2021-08-16 RX ORDER — METRONIDAZOLE 500 MG/100ML
INJECTION, SOLUTION INTRAVENOUS
Status: DISCONTINUED | OUTPATIENT
Start: 2021-08-16 | End: 2021-08-16

## 2021-08-16 RX ORDER — MUPIROCIN 20 MG/G
OINTMENT TOPICAL 2 TIMES DAILY
Status: DISCONTINUED | OUTPATIENT
Start: 2021-08-16 | End: 2021-08-20 | Stop reason: HOSPADM

## 2021-08-16 RX ORDER — VECURONIUM BROMIDE FOR INJECTION 1 MG/ML
INJECTION, POWDER, LYOPHILIZED, FOR SOLUTION INTRAVENOUS
Status: DISCONTINUED | OUTPATIENT
Start: 2021-08-16 | End: 2021-08-16

## 2021-08-16 RX ORDER — OXYCODONE HYDROCHLORIDE 5 MG/1
10 TABLET ORAL EVERY 4 HOURS PRN
Status: CANCELLED | OUTPATIENT
Start: 2021-08-16

## 2021-08-16 RX ORDER — ONDANSETRON 2 MG/ML
4 INJECTION INTRAMUSCULAR; INTRAVENOUS ONCE AS NEEDED
Status: DISCONTINUED | OUTPATIENT
Start: 2021-08-16 | End: 2021-08-16 | Stop reason: HOSPADM

## 2021-08-16 RX ORDER — PROCHLORPERAZINE EDISYLATE 5 MG/ML
5 INJECTION INTRAMUSCULAR; INTRAVENOUS EVERY 30 MIN PRN
Status: DISCONTINUED | OUTPATIENT
Start: 2021-08-16 | End: 2021-08-16 | Stop reason: HOSPADM

## 2021-08-16 RX ORDER — LEVOTHYROXINE SODIUM 25 UG/1
25 TABLET ORAL
Status: DISCONTINUED | OUTPATIENT
Start: 2021-08-17 | End: 2021-08-20 | Stop reason: HOSPADM

## 2021-08-16 RX ORDER — GABAPENTIN 300 MG/1
300 CAPSULE ORAL 3 TIMES DAILY
Status: DISCONTINUED | OUTPATIENT
Start: 2021-08-16 | End: 2021-08-20 | Stop reason: HOSPADM

## 2021-08-16 RX ORDER — SODIUM CHLORIDE 9 MG/ML
INJECTION, SOLUTION INTRAVENOUS CONTINUOUS PRN
Status: DISCONTINUED | OUTPATIENT
Start: 2021-08-16 | End: 2021-08-16

## 2021-08-16 RX ORDER — METOPROLOL TARTRATE 50 MG/1
50 TABLET ORAL 2 TIMES DAILY
Status: DISCONTINUED | OUTPATIENT
Start: 2021-08-16 | End: 2021-08-20 | Stop reason: HOSPADM

## 2021-08-16 RX ORDER — METRONIDAZOLE 500 MG/100ML
500 INJECTION, SOLUTION INTRAVENOUS
Status: COMPLETED | OUTPATIENT
Start: 2021-08-16 | End: 2021-08-16

## 2021-08-16 RX ORDER — MIDAZOLAM HYDROCHLORIDE 1 MG/ML
INJECTION INTRAMUSCULAR; INTRAVENOUS
Status: DISCONTINUED | OUTPATIENT
Start: 2021-08-16 | End: 2021-08-16

## 2021-08-16 RX ORDER — KETAMINE HCL IN 0.9 % NACL 50 MG/5 ML
SYRINGE (ML) INTRAVENOUS
Status: DISCONTINUED | OUTPATIENT
Start: 2021-08-16 | End: 2021-08-16

## 2021-08-16 RX ORDER — GABAPENTIN 300 MG/1
300 CAPSULE ORAL 3 TIMES DAILY
Status: DISCONTINUED | OUTPATIENT
Start: 2021-08-16 | End: 2021-08-16

## 2021-08-16 RX ORDER — OXYCODONE HYDROCHLORIDE 5 MG/1
5 TABLET ORAL EVERY 4 HOURS PRN
Status: CANCELLED | OUTPATIENT
Start: 2021-08-16

## 2021-08-16 RX ORDER — FENTANYL CITRATE 50 UG/ML
INJECTION, SOLUTION INTRAMUSCULAR; INTRAVENOUS
Status: DISCONTINUED | OUTPATIENT
Start: 2021-08-16 | End: 2021-08-16

## 2021-08-16 RX ORDER — HYDROMORPHONE HYDROCHLORIDE 2 MG/ML
INJECTION, SOLUTION INTRAMUSCULAR; INTRAVENOUS; SUBCUTANEOUS
Status: DISCONTINUED | OUTPATIENT
Start: 2021-08-16 | End: 2021-08-16

## 2021-08-16 RX ORDER — SODIUM CHLORIDE 9 MG/ML
INJECTION, SOLUTION INTRAVENOUS
Status: COMPLETED | OUTPATIENT
Start: 2021-08-16 | End: 2021-08-16

## 2021-08-16 RX ORDER — PHENYLEPHRINE HYDROCHLORIDE 10 MG/ML
INJECTION INTRAVENOUS
Status: DISCONTINUED | OUTPATIENT
Start: 2021-08-16 | End: 2021-08-16

## 2021-08-16 RX ORDER — TRIPROLIDINE/PSEUDOEPHEDRINE 2.5MG-60MG
600 TABLET ORAL
Status: COMPLETED | OUTPATIENT
Start: 2021-08-16 | End: 2021-08-16

## 2021-08-16 RX ORDER — SUCCINYLCHOLINE CHLORIDE 20 MG/ML
INJECTION INTRAMUSCULAR; INTRAVENOUS
Status: DISCONTINUED | OUTPATIENT
Start: 2021-08-16 | End: 2021-08-16

## 2021-08-16 RX ORDER — ACETAMINOPHEN 10 MG/ML
INJECTION, SOLUTION INTRAVENOUS
Status: DISCONTINUED | OUTPATIENT
Start: 2021-08-16 | End: 2021-08-16

## 2021-08-16 RX ORDER — ACETAMINOPHEN 500 MG
1000 TABLET ORAL EVERY 8 HOURS
Status: DISCONTINUED | OUTPATIENT
Start: 2021-08-17 | End: 2021-08-20 | Stop reason: HOSPADM

## 2021-08-16 RX ORDER — ROCURONIUM BROMIDE 10 MG/ML
INJECTION, SOLUTION INTRAVENOUS
Status: DISCONTINUED | OUTPATIENT
Start: 2021-08-16 | End: 2021-08-16

## 2021-08-16 RX ORDER — GABAPENTIN 300 MG/1
300 CAPSULE ORAL
Status: COMPLETED | OUTPATIENT
Start: 2021-08-16 | End: 2021-08-16

## 2021-08-16 RX ORDER — HYDROMORPHONE HCL IN 0.9% NACL 6 MG/30 ML
PATIENT CONTROLLED ANALGESIA SYRINGE INTRAVENOUS CONTINUOUS
Status: DISCONTINUED | OUTPATIENT
Start: 2021-08-16 | End: 2021-08-18

## 2021-08-16 RX ORDER — SODIUM CHLORIDE 9 MG/ML
INJECTION, SOLUTION INTRAVENOUS CONTINUOUS
Status: DISCONTINUED | OUTPATIENT
Start: 2021-08-16 | End: 2021-08-20 | Stop reason: HOSPADM

## 2021-08-16 RX ORDER — DEXAMETHASONE SODIUM PHOSPHATE 4 MG/ML
INJECTION, SOLUTION INTRA-ARTICULAR; INTRALESIONAL; INTRAMUSCULAR; INTRAVENOUS; SOFT TISSUE
Status: DISCONTINUED | OUTPATIENT
Start: 2021-08-16 | End: 2021-08-16

## 2021-08-16 RX ORDER — TRAMADOL HYDROCHLORIDE 50 MG/1
50 TABLET ORAL EVERY 6 HOURS PRN
Status: DISCONTINUED | OUTPATIENT
Start: 2021-08-16 | End: 2021-08-20 | Stop reason: HOSPADM

## 2021-08-16 RX ORDER — HYDROMORPHONE HYDROCHLORIDE 1 MG/ML
0.2 INJECTION, SOLUTION INTRAMUSCULAR; INTRAVENOUS; SUBCUTANEOUS EVERY 5 MIN PRN
Status: DISCONTINUED | OUTPATIENT
Start: 2021-08-16 | End: 2021-08-16 | Stop reason: HOSPADM

## 2021-08-16 RX ORDER — LIDOCAINE HYDROCHLORIDE 10 MG/ML
1 INJECTION, SOLUTION EPIDURAL; INFILTRATION; INTRACAUDAL; PERINEURAL
Status: COMPLETED | OUTPATIENT
Start: 2021-08-16 | End: 2021-08-16

## 2021-08-16 RX ORDER — OXYCODONE HYDROCHLORIDE 5 MG/1
5 TABLET ORAL
Status: DISCONTINUED | OUTPATIENT
Start: 2021-08-16 | End: 2021-08-16 | Stop reason: HOSPADM

## 2021-08-16 RX ORDER — LIDOCAINE HYDROCHLORIDE ANHYDROUS AND DEXTROSE MONOHYDRATE .8; 5 G/100ML; G/100ML
INJECTION, SOLUTION INTRAVENOUS CONTINUOUS PRN
Status: DISCONTINUED | OUTPATIENT
Start: 2021-08-16 | End: 2021-08-16

## 2021-08-16 RX ORDER — ALBUMIN HUMAN 50 G/1000ML
SOLUTION INTRAVENOUS CONTINUOUS PRN
Status: DISCONTINUED | OUTPATIENT
Start: 2021-08-16 | End: 2021-08-16

## 2021-08-16 RX ORDER — DEXMEDETOMIDINE HYDROCHLORIDE 4 UG/ML
0-1.4 INJECTION, SOLUTION INTRAVENOUS CONTINUOUS
Status: DISCONTINUED | OUTPATIENT
Start: 2021-08-16 | End: 2021-08-16 | Stop reason: HOSPADM

## 2021-08-16 RX ORDER — MUPIROCIN 20 MG/G
1 OINTMENT TOPICAL
Status: COMPLETED | OUTPATIENT
Start: 2021-08-16 | End: 2021-08-16

## 2021-08-16 RX ORDER — IBUPROFEN 400 MG/1
800 TABLET ORAL EVERY 8 HOURS
Status: DISCONTINUED | OUTPATIENT
Start: 2021-08-17 | End: 2021-08-20 | Stop reason: HOSPADM

## 2021-08-16 RX ORDER — ESCITALOPRAM OXALATE 10 MG/1
10 TABLET ORAL NIGHTLY
Status: DISCONTINUED | OUTPATIENT
Start: 2021-08-17 | End: 2021-08-20 | Stop reason: HOSPADM

## 2021-08-16 RX ORDER — ACETAMINOPHEN 650 MG/20.3ML
975 LIQUID ORAL
Status: COMPLETED | OUTPATIENT
Start: 2021-08-16 | End: 2021-08-16

## 2021-08-16 RX ORDER — PROPOFOL 10 MG/ML
VIAL (ML) INTRAVENOUS
Status: DISCONTINUED | OUTPATIENT
Start: 2021-08-16 | End: 2021-08-16

## 2021-08-16 RX ORDER — PROCHLORPERAZINE EDISYLATE 5 MG/ML
INJECTION INTRAMUSCULAR; INTRAVENOUS
Status: DISCONTINUED | OUTPATIENT
Start: 2021-08-16 | End: 2021-08-16

## 2021-08-16 RX ORDER — ACETAMINOPHEN 10 MG/ML
1000 INJECTION, SOLUTION INTRAVENOUS EVERY 8 HOURS
Status: COMPLETED | OUTPATIENT
Start: 2021-08-16 | End: 2021-08-17

## 2021-08-16 RX ADMIN — LIDOCAINE HYDROCHLORIDE 50 MG: 20 INJECTION, SOLUTION INTRAVENOUS at 07:08

## 2021-08-16 RX ADMIN — ALBUMIN (HUMAN): 12.5 SOLUTION INTRAVENOUS at 02:08

## 2021-08-16 RX ADMIN — Medication 10 MG: at 07:08

## 2021-08-16 RX ADMIN — Medication 10 MG: at 09:08

## 2021-08-16 RX ADMIN — DEXAMETHASONE SODIUM PHOSPHATE 8 MG: 4 INJECTION, SOLUTION INTRAMUSCULAR; INTRAVENOUS at 07:08

## 2021-08-16 RX ADMIN — ACETAMINOPHEN 976.6 MG: 160 SOLUTION ORAL at 06:08

## 2021-08-16 RX ADMIN — VECURONIUM BROMIDE FOR INJECTION 2 MG: 1 INJECTION, POWDER, LYOPHILIZED, FOR SOLUTION INTRAVENOUS at 12:08

## 2021-08-16 RX ADMIN — PROPOFOL 170 MG: 10 INJECTION, EMULSION INTRAVENOUS at 07:08

## 2021-08-16 RX ADMIN — VECURONIUM BROMIDE FOR INJECTION 2 MG: 1 INJECTION, POWDER, LYOPHILIZED, FOR SOLUTION INTRAVENOUS at 10:08

## 2021-08-16 RX ADMIN — ACETAMINOPHEN 1000 MG: 10 INJECTION INTRAVENOUS at 10:08

## 2021-08-16 RX ADMIN — CALCIUM CHLORIDE 500 MG: 100 INJECTION, SOLUTION INTRAVENOUS at 12:08

## 2021-08-16 RX ADMIN — PHENYLEPHRINE HYDROCHLORIDE 100 MCG: 10 INJECTION INTRAVENOUS at 09:08

## 2021-08-16 RX ADMIN — PROCHLORPERAZINE EDISYLATE 2.5 MG: 5 INJECTION INTRAMUSCULAR; INTRAVENOUS at 07:08

## 2021-08-16 RX ADMIN — HYDROMORPHONE HYDROCHLORIDE 0.2 MG: 2 INJECTION, SOLUTION INTRAMUSCULAR; INTRAVENOUS; SUBCUTANEOUS at 06:08

## 2021-08-16 RX ADMIN — ACETAMINOPHEN 1000 MG: 10 INJECTION, SOLUTION INTRAVENOUS at 12:08

## 2021-08-16 RX ADMIN — HYDROMORPHONE HYDROCHLORIDE 0.4 MG: 2 INJECTION, SOLUTION INTRAMUSCULAR; INTRAVENOUS; SUBCUTANEOUS at 05:08

## 2021-08-16 RX ADMIN — SUCCINYLCHOLINE CHLORIDE 120 MG: 20 INJECTION, SOLUTION INTRAMUSCULAR; INTRAVENOUS at 07:08

## 2021-08-16 RX ADMIN — ROCURONIUM BROMIDE 10 MG: 10 INJECTION, SOLUTION INTRAVENOUS at 08:08

## 2021-08-16 RX ADMIN — PIPERACILLIN SODIUM AND TAZOBACTAM SODIUM 4.5 G: 4; .5 INJECTION, POWDER, FOR SOLUTION INTRAVENOUS at 11:08

## 2021-08-16 RX ADMIN — VECURONIUM BROMIDE FOR INJECTION 1 MG: 1 INJECTION, POWDER, LYOPHILIZED, FOR SOLUTION INTRAVENOUS at 01:08

## 2021-08-16 RX ADMIN — VECURONIUM BROMIDE FOR INJECTION 1 MG: 1 INJECTION, POWDER, LYOPHILIZED, FOR SOLUTION INTRAVENOUS at 03:08

## 2021-08-16 RX ADMIN — Medication 10 MG: at 12:08

## 2021-08-16 RX ADMIN — Medication 10 MG: at 08:08

## 2021-08-16 RX ADMIN — FENTANYL CITRATE 50 MCG: 50 INJECTION, SOLUTION INTRAMUSCULAR; INTRAVENOUS at 01:08

## 2021-08-16 RX ADMIN — VECURONIUM BROMIDE FOR INJECTION 2 MG: 1 INJECTION, POWDER, LYOPHILIZED, FOR SOLUTION INTRAVENOUS at 11:08

## 2021-08-16 RX ADMIN — ROCURONIUM BROMIDE 20 MG: 10 INJECTION, SOLUTION INTRAVENOUS at 08:08

## 2021-08-16 RX ADMIN — LIDOCAINE HYDROCHLORIDE 0.02 MG/KG/MIN: 8 INJECTION, SOLUTION INTRAVENOUS at 07:08

## 2021-08-16 RX ADMIN — PHENYLEPHRINE HYDROCHLORIDE 100 MCG: 10 INJECTION INTRAVENOUS at 06:08

## 2021-08-16 RX ADMIN — CEFTRIAXONE 2 G: 2 INJECTION, SOLUTION INTRAVENOUS at 07:08

## 2021-08-16 RX ADMIN — FENTANYL CITRATE 100 MCG: 50 INJECTION, SOLUTION INTRAMUSCULAR; INTRAVENOUS at 07:08

## 2021-08-16 RX ADMIN — GABAPENTIN 300 MG: 300 CAPSULE ORAL at 06:08

## 2021-08-16 RX ADMIN — IBUPROFEN 800 MG: 800 INJECTION INTRAVENOUS at 10:08

## 2021-08-16 RX ADMIN — SODIUM CHLORIDE: 0.9 INJECTION, SOLUTION INTRAVENOUS at 06:08

## 2021-08-16 RX ADMIN — GABAPENTIN 300 MG: 300 CAPSULE ORAL at 10:08

## 2021-08-16 RX ADMIN — SODIUM CHLORIDE: 0.9 INJECTION, SOLUTION INTRAVENOUS at 07:08

## 2021-08-16 RX ADMIN — METOPROLOL TARTRATE 50 MG: 50 TABLET, FILM COATED ORAL at 10:08

## 2021-08-16 RX ADMIN — HEPARIN SODIUM 5000 UNITS: 5000 INJECTION INTRAVENOUS; SUBCUTANEOUS at 06:08

## 2021-08-16 RX ADMIN — Medication: at 07:08

## 2021-08-16 RX ADMIN — ONDANSETRON 4 MG: 2 INJECTION INTRAMUSCULAR; INTRAVENOUS at 06:08

## 2021-08-16 RX ADMIN — LIDOCAINE HYDROCHLORIDE 10 MG: 10 INJECTION, SOLUTION EPIDURAL; INFILTRATION; INTRACAUDAL at 06:08

## 2021-08-16 RX ADMIN — IBUPROFEN 600 MG: 100 SUSPENSION ORAL at 06:08

## 2021-08-16 RX ADMIN — VECURONIUM BROMIDE FOR INJECTION 1 MG: 1 INJECTION, POWDER, LYOPHILIZED, FOR SOLUTION INTRAVENOUS at 02:08

## 2021-08-16 RX ADMIN — DEXMEDETOMIDINE HYDROCHLORIDE 0.2 MCG/KG/HR: 4 INJECTION, SOLUTION INTRAVENOUS at 07:08

## 2021-08-16 RX ADMIN — PHENYLEPHRINE HYDROCHLORIDE 100 MCG: 10 INJECTION INTRAVENOUS at 08:08

## 2021-08-16 RX ADMIN — SUGAMMADEX 200 MG: 100 INJECTION, SOLUTION INTRAVENOUS at 06:08

## 2021-08-16 RX ADMIN — GLYCOPYRROLATE 0.2 MG: 0.2 INJECTION, SOLUTION INTRAMUSCULAR; INTRAVITREAL at 08:08

## 2021-08-16 RX ADMIN — VECURONIUM BROMIDE FOR INJECTION 2 MG: 1 INJECTION, POWDER, LYOPHILIZED, FOR SOLUTION INTRAVENOUS at 09:08

## 2021-08-16 RX ADMIN — METRONIDAZOLE 500 MG: 500 INJECTION, SOLUTION INTRAVENOUS at 04:08

## 2021-08-16 RX ADMIN — GLYCOPYRROLATE 0.2 MG: 0.2 INJECTION, SOLUTION INTRAMUSCULAR; INTRAVITREAL at 12:08

## 2021-08-16 RX ADMIN — MUPIROCIN: 20 OINTMENT TOPICAL at 10:08

## 2021-08-16 RX ADMIN — SODIUM CHLORIDE, SODIUM GLUCONATE, SODIUM ACETATE, POTASSIUM CHLORIDE, MAGNESIUM CHLORIDE, SODIUM PHOSPHATE, DIBASIC, AND POTASSIUM PHOSPHATE: .53; .5; .37; .037; .03; .012; .00082 INJECTION, SOLUTION INTRAVENOUS at 07:08

## 2021-08-16 RX ADMIN — PHENYLEPHRINE HYDROCHLORIDE 100 MCG: 10 INJECTION INTRAVENOUS at 12:08

## 2021-08-16 RX ADMIN — FENTANYL CITRATE 50 MCG: 50 INJECTION, SOLUTION INTRAMUSCULAR; INTRAVENOUS at 02:08

## 2021-08-16 RX ADMIN — Medication 10 MG: at 10:08

## 2021-08-16 RX ADMIN — CALCIUM CHLORIDE 500 MG: 100 INJECTION, SOLUTION INTRAVENOUS at 11:08

## 2021-08-16 RX ADMIN — ROCURONIUM BROMIDE 10 MG: 10 INJECTION, SOLUTION INTRAVENOUS at 07:08

## 2021-08-16 RX ADMIN — ROCURONIUM BROMIDE 40 MG: 10 INJECTION, SOLUTION INTRAVENOUS at 07:08

## 2021-08-16 RX ADMIN — SODIUM BICARBONATE 50 MEQ: 84 INJECTION, SOLUTION INTRAVENOUS at 01:08

## 2021-08-16 RX ADMIN — MUPIROCIN 1 G: 20 OINTMENT TOPICAL at 06:08

## 2021-08-16 RX ADMIN — ONDANSETRON 4 MG: 2 INJECTION INTRAMUSCULAR; INTRAVENOUS at 07:08

## 2021-08-16 RX ADMIN — METRONIDAZOLE 500 MG: 500 INJECTION, SOLUTION INTRAVENOUS at 07:08

## 2021-08-16 RX ADMIN — PHENYLEPHRINE HYDROCHLORIDE 200 MCG: 10 INJECTION INTRAVENOUS at 07:08

## 2021-08-16 RX ADMIN — MIDAZOLAM HYDROCHLORIDE 2 MG: 1 INJECTION, SOLUTION INTRAMUSCULAR; INTRAVENOUS at 07:08

## 2021-08-17 LAB
ANION GAP SERPL CALC-SCNC: 10 MMOL/L (ref 8–16)
BASOPHILS # BLD AUTO: 0.03 K/UL (ref 0–0.2)
BASOPHILS NFR BLD: 0.2 % (ref 0–1.9)
BUN SERPL-MCNC: 23 MG/DL (ref 6–20)
CALCIUM SERPL-MCNC: 8 MG/DL (ref 8.7–10.5)
CHLORIDE SERPL-SCNC: 108 MMOL/L (ref 95–110)
CO2 SERPL-SCNC: 20 MMOL/L (ref 23–29)
CREAT SERPL-MCNC: 1.1 MG/DL (ref 0.5–1.4)
DIFFERENTIAL METHOD: ABNORMAL
EOSINOPHIL # BLD AUTO: 0 K/UL (ref 0–0.5)
EOSINOPHIL NFR BLD: 0 % (ref 0–8)
ERYTHROCYTE [DISTWIDTH] IN BLOOD BY AUTOMATED COUNT: 15.9 % (ref 11.5–14.5)
EST. GFR  (AFRICAN AMERICAN): >60 ML/MIN/1.73 M^2
EST. GFR  (NON AFRICAN AMERICAN): 55.9 ML/MIN/1.73 M^2
GLUCOSE SERPL-MCNC: 135 MG/DL (ref 70–110)
GLUCOSE SERPL-MCNC: 81 MG/DL (ref 70–110)
HCO3 UR-SCNC: 10.2 MMOL/L (ref 24–28)
HCT VFR BLD AUTO: 30.8 % (ref 37–48.5)
HCT VFR BLD CALC: <15 %PCV (ref 36–54)
HGB BLD-MCNC: 9.9 G/DL (ref 12–16)
IMM GRANULOCYTES # BLD AUTO: 0.13 K/UL (ref 0–0.04)
IMM GRANULOCYTES NFR BLD AUTO: 0.7 % (ref 0–0.5)
LYMPHOCYTES # BLD AUTO: 0.5 K/UL (ref 1–4.8)
LYMPHOCYTES NFR BLD: 2.3 % (ref 18–48)
MAGNESIUM SERPL-MCNC: 1.9 MG/DL (ref 1.6–2.6)
MCH RBC QN AUTO: 27.3 PG (ref 27–31)
MCHC RBC AUTO-ENTMCNC: 32.1 G/DL (ref 32–36)
MCV RBC AUTO: 85 FL (ref 82–98)
MONOCYTES # BLD AUTO: 1 K/UL (ref 0.3–1)
MONOCYTES NFR BLD: 5.1 % (ref 4–15)
NEUTROPHILS # BLD AUTO: 18.1 K/UL (ref 1.8–7.7)
NEUTROPHILS NFR BLD: 91.7 % (ref 38–73)
NRBC BLD-RTO: 0 /100 WBC
PCO2 BLDA: 21.8 MMHG (ref 35–45)
PH SMN: 7.28 [PH] (ref 7.35–7.45)
PHOSPHATE SERPL-MCNC: 5.6 MG/DL (ref 2.7–4.5)
PLATELET # BLD AUTO: 140 K/UL (ref 150–450)
PMV BLD AUTO: 9.2 FL (ref 9.2–12.9)
PO2 BLDA: 100 MMHG (ref 80–100)
POC BE: -17 MMOL/L
POC IONIZED CALCIUM: 0.72 MMOL/L (ref 1.06–1.42)
POC SATURATED O2: 97 % (ref 95–100)
POC TCO2: 11 MMOL/L (ref 23–27)
POTASSIUM BLD-SCNC: <2 MMOL/L (ref 3.5–5.1)
POTASSIUM SERPL-SCNC: 4 MMOL/L (ref 3.5–5.1)
RBC # BLD AUTO: 3.62 M/UL (ref 4–5.4)
SAMPLE: ABNORMAL
SODIUM BLD-SCNC: 152 MMOL/L (ref 136–145)
SODIUM SERPL-SCNC: 138 MMOL/L (ref 136–145)
WBC # BLD AUTO: 19.68 K/UL (ref 3.9–12.7)

## 2021-08-17 PROCEDURE — 80048 BASIC METABOLIC PNL TOTAL CA: CPT | Performed by: STUDENT IN AN ORGANIZED HEALTH CARE EDUCATION/TRAINING PROGRAM

## 2021-08-17 PROCEDURE — 25000003 PHARM REV CODE 250: Performed by: STUDENT IN AN ORGANIZED HEALTH CARE EDUCATION/TRAINING PROGRAM

## 2021-08-17 PROCEDURE — 84100 ASSAY OF PHOSPHORUS: CPT | Performed by: STUDENT IN AN ORGANIZED HEALTH CARE EDUCATION/TRAINING PROGRAM

## 2021-08-17 PROCEDURE — 20600001 HC STEP DOWN PRIVATE ROOM

## 2021-08-17 PROCEDURE — 63600175 PHARM REV CODE 636 W HCPCS: Performed by: STUDENT IN AN ORGANIZED HEALTH CARE EDUCATION/TRAINING PROGRAM

## 2021-08-17 PROCEDURE — 25000003 PHARM REV CODE 250: Performed by: NURSE PRACTITIONER

## 2021-08-17 PROCEDURE — 85025 COMPLETE CBC W/AUTO DIFF WBC: CPT | Performed by: STUDENT IN AN ORGANIZED HEALTH CARE EDUCATION/TRAINING PROGRAM

## 2021-08-17 PROCEDURE — 83735 ASSAY OF MAGNESIUM: CPT | Performed by: STUDENT IN AN ORGANIZED HEALTH CARE EDUCATION/TRAINING PROGRAM

## 2021-08-17 RX ADMIN — ACETAMINOPHEN 1000 MG: 10 INJECTION INTRAVENOUS at 02:08

## 2021-08-17 RX ADMIN — PIPERACILLIN SODIUM AND TAZOBACTAM SODIUM 4.5 G: 4; .5 INJECTION, POWDER, FOR SOLUTION INTRAVENOUS at 02:08

## 2021-08-17 RX ADMIN — Medication: at 12:08

## 2021-08-17 RX ADMIN — GABAPENTIN 300 MG: 300 CAPSULE ORAL at 02:08

## 2021-08-17 RX ADMIN — MUPIROCIN: 20 OINTMENT TOPICAL at 08:08

## 2021-08-17 RX ADMIN — IBUPROFEN 800 MG: 800 INJECTION INTRAVENOUS at 05:08

## 2021-08-17 RX ADMIN — LEVOTHYROXINE SODIUM 25 MCG: 25 TABLET ORAL at 05:08

## 2021-08-17 RX ADMIN — GABAPENTIN 300 MG: 300 CAPSULE ORAL at 08:08

## 2021-08-17 RX ADMIN — PIPERACILLIN SODIUM AND TAZOBACTAM SODIUM 4.5 G: 4; .5 INJECTION, POWDER, FOR SOLUTION INTRAVENOUS at 06:08

## 2021-08-17 RX ADMIN — ACETAMINOPHEN 1000 MG: 10 INJECTION INTRAVENOUS at 05:08

## 2021-08-17 RX ADMIN — GABAPENTIN 300 MG: 300 CAPSULE ORAL at 09:08

## 2021-08-17 RX ADMIN — Medication: at 10:08

## 2021-08-17 RX ADMIN — PIPERACILLIN SODIUM AND TAZOBACTAM SODIUM 4.5 G: 4; .5 INJECTION, POWDER, FOR SOLUTION INTRAVENOUS at 11:08

## 2021-08-17 RX ADMIN — ESCITALOPRAM OXALATE 10 MG: 10 TABLET ORAL at 09:08

## 2021-08-17 RX ADMIN — METOPROLOL TARTRATE 50 MG: 50 TABLET, FILM COATED ORAL at 08:08

## 2021-08-17 RX ADMIN — METOPROLOL TARTRATE 50 MG: 50 TABLET, FILM COATED ORAL at 09:08

## 2021-08-17 RX ADMIN — MUPIROCIN: 20 OINTMENT TOPICAL at 09:08

## 2021-08-17 RX ADMIN — ACETAMINOPHEN 1000 MG: 500 TABLET ORAL at 09:08

## 2021-08-17 RX ADMIN — IBUPROFEN 800 MG: 800 INJECTION INTRAVENOUS at 02:08

## 2021-08-17 RX ADMIN — IBUPROFEN 800 MG: 400 TABLET ORAL at 09:08

## 2021-08-18 LAB
ANION GAP SERPL CALC-SCNC: 9 MMOL/L (ref 8–16)
BASOPHILS # BLD AUTO: 0.04 K/UL (ref 0–0.2)
BASOPHILS NFR BLD: 0.4 % (ref 0–1.9)
BUN SERPL-MCNC: 26 MG/DL (ref 6–20)
CALCIUM SERPL-MCNC: 8.2 MG/DL (ref 8.7–10.5)
CHLORIDE SERPL-SCNC: 108 MMOL/L (ref 95–110)
CO2 SERPL-SCNC: 22 MMOL/L (ref 23–29)
CREAT SERPL-MCNC: 0.8 MG/DL (ref 0.5–1.4)
DIFFERENTIAL METHOD: ABNORMAL
EOSINOPHIL # BLD AUTO: 0 K/UL (ref 0–0.5)
EOSINOPHIL NFR BLD: 0.4 % (ref 0–8)
ERYTHROCYTE [DISTWIDTH] IN BLOOD BY AUTOMATED COUNT: 15.9 % (ref 11.5–14.5)
EST. GFR  (AFRICAN AMERICAN): >60 ML/MIN/1.73 M^2
EST. GFR  (NON AFRICAN AMERICAN): >60 ML/MIN/1.73 M^2
GLUCOSE SERPL-MCNC: 102 MG/DL (ref 70–110)
HCT VFR BLD AUTO: 27.7 % (ref 37–48.5)
HGB BLD-MCNC: 9 G/DL (ref 12–16)
IMM GRANULOCYTES # BLD AUTO: 0.06 K/UL (ref 0–0.04)
IMM GRANULOCYTES NFR BLD AUTO: 0.6 % (ref 0–0.5)
LYMPHOCYTES # BLD AUTO: 0.7 K/UL (ref 1–4.8)
LYMPHOCYTES NFR BLD: 6.8 % (ref 18–48)
MCH RBC QN AUTO: 28 PG (ref 27–31)
MCHC RBC AUTO-ENTMCNC: 32.5 G/DL (ref 32–36)
MCV RBC AUTO: 86 FL (ref 82–98)
MONOCYTES # BLD AUTO: 0.5 K/UL (ref 0.3–1)
MONOCYTES NFR BLD: 4.7 % (ref 4–15)
NEUTROPHILS # BLD AUTO: 9.3 K/UL (ref 1.8–7.7)
NEUTROPHILS NFR BLD: 87.1 % (ref 38–73)
NRBC BLD-RTO: 0 /100 WBC
PLATELET # BLD AUTO: 111 K/UL (ref 150–450)
PMV BLD AUTO: 10.4 FL (ref 9.2–12.9)
POTASSIUM SERPL-SCNC: 4.1 MMOL/L (ref 3.5–5.1)
RBC # BLD AUTO: 3.21 M/UL (ref 4–5.4)
SODIUM SERPL-SCNC: 139 MMOL/L (ref 136–145)
WBC # BLD AUTO: 10.61 K/UL (ref 3.9–12.7)

## 2021-08-18 PROCEDURE — 85025 COMPLETE CBC W/AUTO DIFF WBC: CPT

## 2021-08-18 PROCEDURE — 25000003 PHARM REV CODE 250: Performed by: STUDENT IN AN ORGANIZED HEALTH CARE EDUCATION/TRAINING PROGRAM

## 2021-08-18 PROCEDURE — 99900035 HC TECH TIME PER 15 MIN (STAT)

## 2021-08-18 PROCEDURE — 80048 BASIC METABOLIC PNL TOTAL CA: CPT

## 2021-08-18 PROCEDURE — 63600175 PHARM REV CODE 636 W HCPCS: Performed by: STUDENT IN AN ORGANIZED HEALTH CARE EDUCATION/TRAINING PROGRAM

## 2021-08-18 PROCEDURE — 25000003 PHARM REV CODE 250: Performed by: NURSE PRACTITIONER

## 2021-08-18 PROCEDURE — 25000003 PHARM REV CODE 250

## 2021-08-18 PROCEDURE — 20600001 HC STEP DOWN PRIVATE ROOM

## 2021-08-18 PROCEDURE — 36415 COLL VENOUS BLD VENIPUNCTURE: CPT

## 2021-08-18 RX ORDER — HYDROMORPHONE HYDROCHLORIDE 1 MG/ML
0.5 INJECTION, SOLUTION INTRAMUSCULAR; INTRAVENOUS; SUBCUTANEOUS EVERY 4 HOURS PRN
Status: DISCONTINUED | OUTPATIENT
Start: 2021-08-18 | End: 2021-08-20 | Stop reason: HOSPADM

## 2021-08-18 RX ORDER — OXYCODONE HYDROCHLORIDE 10 MG/1
10 TABLET ORAL EVERY 4 HOURS PRN
Status: DISCONTINUED | OUTPATIENT
Start: 2021-08-18 | End: 2021-08-20 | Stop reason: HOSPADM

## 2021-08-18 RX ORDER — OXYCODONE HYDROCHLORIDE 5 MG/1
5 TABLET ORAL EVERY 4 HOURS PRN
Status: DISCONTINUED | OUTPATIENT
Start: 2021-08-18 | End: 2021-08-20 | Stop reason: HOSPADM

## 2021-08-18 RX ADMIN — OXYCODONE 5 MG: 5 TABLET ORAL at 07:08

## 2021-08-18 RX ADMIN — MUPIROCIN: 20 OINTMENT TOPICAL at 09:08

## 2021-08-18 RX ADMIN — GABAPENTIN 300 MG: 300 CAPSULE ORAL at 09:08

## 2021-08-18 RX ADMIN — ACETAMINOPHEN 1000 MG: 500 TABLET ORAL at 05:08

## 2021-08-18 RX ADMIN — OXYCODONE HYDROCHLORIDE 10 MG: 10 TABLET ORAL at 12:08

## 2021-08-18 RX ADMIN — IBUPROFEN 800 MG: 400 TABLET ORAL at 09:08

## 2021-08-18 RX ADMIN — ACETAMINOPHEN 1000 MG: 500 TABLET ORAL at 09:08

## 2021-08-18 RX ADMIN — ACETAMINOPHEN 1000 MG: 500 TABLET ORAL at 02:08

## 2021-08-18 RX ADMIN — IBUPROFEN 800 MG: 400 TABLET ORAL at 02:08

## 2021-08-18 RX ADMIN — PIPERACILLIN SODIUM AND TAZOBACTAM SODIUM 4.5 G: 4; .5 INJECTION, POWDER, FOR SOLUTION INTRAVENOUS at 02:08

## 2021-08-18 RX ADMIN — ESCITALOPRAM OXALATE 10 MG: 10 TABLET ORAL at 09:08

## 2021-08-18 RX ADMIN — PIPERACILLIN SODIUM AND TAZOBACTAM SODIUM 4.5 G: 4; .5 INJECTION, POWDER, FOR SOLUTION INTRAVENOUS at 06:08

## 2021-08-18 RX ADMIN — LEVOTHYROXINE SODIUM 25 MCG: 25 TABLET ORAL at 05:08

## 2021-08-18 RX ADMIN — IBUPROFEN 800 MG: 400 TABLET ORAL at 05:08

## 2021-08-18 RX ADMIN — GABAPENTIN 300 MG: 300 CAPSULE ORAL at 02:08

## 2021-08-18 RX ADMIN — METOPROLOL TARTRATE 50 MG: 50 TABLET, FILM COATED ORAL at 09:08

## 2021-08-18 RX ADMIN — PIPERACILLIN SODIUM AND TAZOBACTAM SODIUM 4.5 G: 4; .5 INJECTION, POWDER, FOR SOLUTION INTRAVENOUS at 11:08

## 2021-08-19 LAB
ANION GAP SERPL CALC-SCNC: 7 MMOL/L (ref 8–16)
BASOPHILS # BLD AUTO: 0.04 K/UL (ref 0–0.2)
BASOPHILS NFR BLD: 0.6 % (ref 0–1.9)
BUN SERPL-MCNC: 16 MG/DL (ref 6–20)
CALCIUM SERPL-MCNC: 8.3 MG/DL (ref 8.7–10.5)
CHLORIDE SERPL-SCNC: 109 MMOL/L (ref 95–110)
CO2 SERPL-SCNC: 23 MMOL/L (ref 23–29)
CREAT SERPL-MCNC: 0.7 MG/DL (ref 0.5–1.4)
CRP SERPL-MCNC: 67.7 MG/L (ref 0–8.2)
DIFFERENTIAL METHOD: ABNORMAL
EOSINOPHIL # BLD AUTO: 0.2 K/UL (ref 0–0.5)
EOSINOPHIL NFR BLD: 3 % (ref 0–8)
ERYTHROCYTE [DISTWIDTH] IN BLOOD BY AUTOMATED COUNT: 15.6 % (ref 11.5–14.5)
EST. GFR  (AFRICAN AMERICAN): >60 ML/MIN/1.73 M^2
EST. GFR  (NON AFRICAN AMERICAN): >60 ML/MIN/1.73 M^2
GLUCOSE SERPL-MCNC: 114 MG/DL (ref 70–110)
HCT VFR BLD AUTO: 28.2 % (ref 37–48.5)
HGB BLD-MCNC: 9.1 G/DL (ref 12–16)
IMM GRANULOCYTES # BLD AUTO: 0.03 K/UL (ref 0–0.04)
IMM GRANULOCYTES NFR BLD AUTO: 0.4 % (ref 0–0.5)
LYMPHOCYTES # BLD AUTO: 0.6 K/UL (ref 1–4.8)
LYMPHOCYTES NFR BLD: 9.2 % (ref 18–48)
MCH RBC QN AUTO: 27.1 PG (ref 27–31)
MCHC RBC AUTO-ENTMCNC: 32.3 G/DL (ref 32–36)
MCV RBC AUTO: 84 FL (ref 82–98)
MONOCYTES # BLD AUTO: 0.3 K/UL (ref 0.3–1)
MONOCYTES NFR BLD: 5.1 % (ref 4–15)
NEUTROPHILS # BLD AUTO: 5.5 K/UL (ref 1.8–7.7)
NEUTROPHILS NFR BLD: 81.7 % (ref 38–73)
NRBC BLD-RTO: 0 /100 WBC
PLATELET # BLD AUTO: 113 K/UL (ref 150–450)
PMV BLD AUTO: 9 FL (ref 9.2–12.9)
POTASSIUM SERPL-SCNC: 3.3 MMOL/L (ref 3.5–5.1)
RBC # BLD AUTO: 3.36 M/UL (ref 4–5.4)
SODIUM SERPL-SCNC: 139 MMOL/L (ref 136–145)
WBC # BLD AUTO: 6.71 K/UL (ref 3.9–12.7)

## 2021-08-19 PROCEDURE — 97535 SELF CARE MNGMENT TRAINING: CPT

## 2021-08-19 PROCEDURE — 25000003 PHARM REV CODE 250

## 2021-08-19 PROCEDURE — 97162 PT EVAL MOD COMPLEX 30 MIN: CPT

## 2021-08-19 PROCEDURE — 25000003 PHARM REV CODE 250: Performed by: STUDENT IN AN ORGANIZED HEALTH CARE EDUCATION/TRAINING PROGRAM

## 2021-08-19 PROCEDURE — 20600001 HC STEP DOWN PRIVATE ROOM

## 2021-08-19 PROCEDURE — 99024 PR POST-OP FOLLOW-UP VISIT: ICD-10-PCS | Mod: ,,, | Performed by: OBSTETRICS & GYNECOLOGY

## 2021-08-19 PROCEDURE — 97530 THERAPEUTIC ACTIVITIES: CPT

## 2021-08-19 PROCEDURE — 85025 COMPLETE CBC W/AUTO DIFF WBC: CPT

## 2021-08-19 PROCEDURE — 86140 C-REACTIVE PROTEIN: CPT | Performed by: STUDENT IN AN ORGANIZED HEALTH CARE EDUCATION/TRAINING PROGRAM

## 2021-08-19 PROCEDURE — 25000003 PHARM REV CODE 250: Performed by: NURSE PRACTITIONER

## 2021-08-19 PROCEDURE — 99024 POSTOP FOLLOW-UP VISIT: CPT | Mod: ,,, | Performed by: OBSTETRICS & GYNECOLOGY

## 2021-08-19 PROCEDURE — 36415 COLL VENOUS BLD VENIPUNCTURE: CPT | Performed by: STUDENT IN AN ORGANIZED HEALTH CARE EDUCATION/TRAINING PROGRAM

## 2021-08-19 PROCEDURE — 63600175 PHARM REV CODE 636 W HCPCS: Performed by: STUDENT IN AN ORGANIZED HEALTH CARE EDUCATION/TRAINING PROGRAM

## 2021-08-19 PROCEDURE — 80048 BASIC METABOLIC PNL TOTAL CA: CPT

## 2021-08-19 PROCEDURE — 97165 OT EVAL LOW COMPLEX 30 MIN: CPT

## 2021-08-19 RX ADMIN — LEVOTHYROXINE SODIUM 25 MCG: 25 TABLET ORAL at 06:08

## 2021-08-19 RX ADMIN — OXYCODONE 5 MG: 5 TABLET ORAL at 06:08

## 2021-08-19 RX ADMIN — IBUPROFEN 800 MG: 400 TABLET ORAL at 03:08

## 2021-08-19 RX ADMIN — GABAPENTIN 300 MG: 300 CAPSULE ORAL at 03:08

## 2021-08-19 RX ADMIN — ACETAMINOPHEN 1000 MG: 500 TABLET ORAL at 10:08

## 2021-08-19 RX ADMIN — METOPROLOL TARTRATE 50 MG: 50 TABLET, FILM COATED ORAL at 08:08

## 2021-08-19 RX ADMIN — PIPERACILLIN SODIUM AND TAZOBACTAM SODIUM 4.5 G: 4; .5 INJECTION, POWDER, FOR SOLUTION INTRAVENOUS at 06:08

## 2021-08-19 RX ADMIN — ESCITALOPRAM OXALATE 10 MG: 10 TABLET ORAL at 08:08

## 2021-08-19 RX ADMIN — TRAMADOL HYDROCHLORIDE 50 MG: 50 TABLET, COATED ORAL at 08:08

## 2021-08-19 RX ADMIN — ACETAMINOPHEN 1000 MG: 500 TABLET ORAL at 06:08

## 2021-08-19 RX ADMIN — MUPIROCIN: 20 OINTMENT TOPICAL at 08:08

## 2021-08-19 RX ADMIN — ACETAMINOPHEN 1000 MG: 500 TABLET ORAL at 01:08

## 2021-08-19 RX ADMIN — OXYCODONE 5 MG: 5 TABLET ORAL at 04:08

## 2021-08-19 RX ADMIN — PIPERACILLIN SODIUM AND TAZOBACTAM SODIUM 4.5 G: 4; .5 INJECTION, POWDER, FOR SOLUTION INTRAVENOUS at 10:08

## 2021-08-19 RX ADMIN — IBUPROFEN 800 MG: 400 TABLET ORAL at 10:08

## 2021-08-19 RX ADMIN — OXYCODONE 5 MG: 5 TABLET ORAL at 08:08

## 2021-08-19 RX ADMIN — GABAPENTIN 300 MG: 300 CAPSULE ORAL at 08:08

## 2021-08-19 RX ADMIN — PIPERACILLIN SODIUM AND TAZOBACTAM SODIUM 4.5 G: 4; .5 INJECTION, POWDER, FOR SOLUTION INTRAVENOUS at 04:08

## 2021-08-19 RX ADMIN — MUPIROCIN: 20 OINTMENT TOPICAL at 09:08

## 2021-08-19 RX ADMIN — OXYCODONE 5 MG: 5 TABLET ORAL at 11:08

## 2021-08-20 VITALS
SYSTOLIC BLOOD PRESSURE: 159 MMHG | OXYGEN SATURATION: 93 % | DIASTOLIC BLOOD PRESSURE: 67 MMHG | BODY MASS INDEX: 32.1 KG/M2 | HEIGHT: 65 IN | WEIGHT: 192.69 LBS | TEMPERATURE: 99 F | RESPIRATION RATE: 18 BRPM | HEART RATE: 69 BPM

## 2021-08-20 LAB
ANION GAP SERPL CALC-SCNC: 8 MMOL/L (ref 8–16)
BASOPHILS # BLD AUTO: 0.04 K/UL (ref 0–0.2)
BASOPHILS NFR BLD: 0.7 % (ref 0–1.9)
BLD PROD TYP BPU: NORMAL
BLOOD UNIT EXPIRATION DATE: NORMAL
BLOOD UNIT TYPE CODE: 600
BLOOD UNIT TYPE: NORMAL
BUN SERPL-MCNC: 15 MG/DL (ref 6–20)
CALCIUM SERPL-MCNC: 8.4 MG/DL (ref 8.7–10.5)
CHLORIDE SERPL-SCNC: 109 MMOL/L (ref 95–110)
CO2 SERPL-SCNC: 25 MMOL/L (ref 23–29)
CODING SYSTEM: NORMAL
CREAT SERPL-MCNC: 0.7 MG/DL (ref 0.5–1.4)
CRP SERPL-MCNC: 30.4 MG/L (ref 0–8.2)
DIFFERENTIAL METHOD: ABNORMAL
DISPENSE STATUS: NORMAL
EOSINOPHIL # BLD AUTO: 0.3 K/UL (ref 0–0.5)
EOSINOPHIL NFR BLD: 4.7 % (ref 0–8)
ERYTHROCYTE [DISTWIDTH] IN BLOOD BY AUTOMATED COUNT: 15.3 % (ref 11.5–14.5)
EST. GFR  (AFRICAN AMERICAN): >60 ML/MIN/1.73 M^2
EST. GFR  (NON AFRICAN AMERICAN): >60 ML/MIN/1.73 M^2
GLUCOSE SERPL-MCNC: 115 MG/DL (ref 70–110)
HCT VFR BLD AUTO: 26.1 % (ref 37–48.5)
HGB BLD-MCNC: 8.7 G/DL (ref 12–16)
IMM GRANULOCYTES # BLD AUTO: 0.03 K/UL (ref 0–0.04)
IMM GRANULOCYTES NFR BLD AUTO: 0.5 % (ref 0–0.5)
LYMPHOCYTES # BLD AUTO: 0.7 K/UL (ref 1–4.8)
LYMPHOCYTES NFR BLD: 11.6 % (ref 18–48)
MCH RBC QN AUTO: 27.5 PG (ref 27–31)
MCHC RBC AUTO-ENTMCNC: 33.3 G/DL (ref 32–36)
MCV RBC AUTO: 83 FL (ref 82–98)
MONOCYTES # BLD AUTO: 0.3 K/UL (ref 0.3–1)
MONOCYTES NFR BLD: 5.1 % (ref 4–15)
NEUTROPHILS # BLD AUTO: 4.6 K/UL (ref 1.8–7.7)
NEUTROPHILS NFR BLD: 77.4 % (ref 38–73)
NRBC BLD-RTO: 0 /100 WBC
NUM UNITS TRANS PACKED RBC: NORMAL
PLATELET # BLD AUTO: 138 K/UL (ref 150–450)
PMV BLD AUTO: 8.9 FL (ref 9.2–12.9)
POTASSIUM SERPL-SCNC: 3.1 MMOL/L (ref 3.5–5.1)
RBC # BLD AUTO: 3.16 M/UL (ref 4–5.4)
SODIUM SERPL-SCNC: 142 MMOL/L (ref 136–145)
WBC # BLD AUTO: 5.93 K/UL (ref 3.9–12.7)

## 2021-08-20 PROCEDURE — 97530 THERAPEUTIC ACTIVITIES: CPT

## 2021-08-20 PROCEDURE — 25000003 PHARM REV CODE 250

## 2021-08-20 PROCEDURE — 85025 COMPLETE CBC W/AUTO DIFF WBC: CPT

## 2021-08-20 PROCEDURE — 97535 SELF CARE MNGMENT TRAINING: CPT

## 2021-08-20 PROCEDURE — 86140 C-REACTIVE PROTEIN: CPT | Performed by: STUDENT IN AN ORGANIZED HEALTH CARE EDUCATION/TRAINING PROGRAM

## 2021-08-20 PROCEDURE — 63600175 PHARM REV CODE 636 W HCPCS: Performed by: STUDENT IN AN ORGANIZED HEALTH CARE EDUCATION/TRAINING PROGRAM

## 2021-08-20 PROCEDURE — 80048 BASIC METABOLIC PNL TOTAL CA: CPT

## 2021-08-20 PROCEDURE — 36415 COLL VENOUS BLD VENIPUNCTURE: CPT | Performed by: STUDENT IN AN ORGANIZED HEALTH CARE EDUCATION/TRAINING PROGRAM

## 2021-08-20 PROCEDURE — 25000003 PHARM REV CODE 250: Performed by: STUDENT IN AN ORGANIZED HEALTH CARE EDUCATION/TRAINING PROGRAM

## 2021-08-20 PROCEDURE — 97116 GAIT TRAINING THERAPY: CPT

## 2021-08-20 PROCEDURE — 25000003 PHARM REV CODE 250: Performed by: NURSE PRACTITIONER

## 2021-08-20 RX ORDER — OXYCODONE HYDROCHLORIDE 5 MG/1
5 TABLET ORAL EVERY 4 HOURS PRN
Qty: 30 TABLET | Refills: 0 | Status: SHIPPED | OUTPATIENT
Start: 2021-08-20 | End: 2021-08-20

## 2021-08-20 RX ORDER — CEPHALEXIN 500 MG/1
500 CAPSULE ORAL EVERY 12 HOURS
Qty: 14 CAPSULE | Refills: 0 | Status: SHIPPED | OUTPATIENT
Start: 2021-08-20 | End: 2021-08-27

## 2021-08-20 RX ORDER — CEPHALEXIN 500 MG/1
500 CAPSULE ORAL EVERY 12 HOURS
Qty: 14 CAPSULE | Refills: 0 | Status: SHIPPED | OUTPATIENT
Start: 2021-08-20 | End: 2021-08-20 | Stop reason: SDUPTHER

## 2021-08-20 RX ORDER — OXYCODONE HYDROCHLORIDE 5 MG/1
5 TABLET ORAL EVERY 4 HOURS PRN
Qty: 30 TABLET | Refills: 0 | Status: SHIPPED | OUTPATIENT
Start: 2021-08-20 | End: 2022-09-30

## 2021-08-20 RX ADMIN — MUPIROCIN: 20 OINTMENT TOPICAL at 09:08

## 2021-08-20 RX ADMIN — OXYCODONE 5 MG: 5 TABLET ORAL at 09:08

## 2021-08-20 RX ADMIN — LEVOTHYROXINE SODIUM 25 MCG: 25 TABLET ORAL at 05:08

## 2021-08-20 RX ADMIN — METOPROLOL TARTRATE 50 MG: 50 TABLET, FILM COATED ORAL at 09:08

## 2021-08-20 RX ADMIN — IBUPROFEN 800 MG: 400 TABLET ORAL at 01:08

## 2021-08-20 RX ADMIN — ACETAMINOPHEN 1000 MG: 500 TABLET ORAL at 01:08

## 2021-08-20 RX ADMIN — PIPERACILLIN SODIUM AND TAZOBACTAM SODIUM 4.5 G: 4; .5 INJECTION, POWDER, FOR SOLUTION INTRAVENOUS at 06:08

## 2021-08-20 RX ADMIN — GABAPENTIN 300 MG: 300 CAPSULE ORAL at 09:08

## 2021-08-21 PROCEDURE — G0180 MD CERTIFICATION HHA PATIENT: HCPCS | Mod: ,,, | Performed by: COLON & RECTAL SURGERY

## 2021-08-21 PROCEDURE — G0180 PR HOME HEALTH MD CERTIFICATION: ICD-10-PCS | Mod: ,,, | Performed by: COLON & RECTAL SURGERY

## 2021-08-23 LAB
FINAL PATHOLOGIC DIAGNOSIS: NORMAL
GROSS: NORMAL
Lab: NORMAL

## 2021-08-25 ENCOUNTER — TELEPHONE (OUTPATIENT)
Dept: SURGERY | Facility: CLINIC | Age: 56
End: 2021-08-25

## 2021-08-25 ENCOUNTER — OFFICE VISIT (OUTPATIENT)
Dept: PLASTIC SURGERY | Facility: CLINIC | Age: 56
End: 2021-08-25
Payer: COMMERCIAL

## 2021-08-25 VITALS — HEART RATE: 65 BPM | SYSTOLIC BLOOD PRESSURE: 116 MMHG | DIASTOLIC BLOOD PRESSURE: 58 MMHG

## 2021-08-25 DIAGNOSIS — Z09 SURGERY FOLLOW-UP EXAMINATION: Primary | ICD-10-CM

## 2021-08-25 PROCEDURE — 1160F RVW MEDS BY RX/DR IN RCRD: CPT | Mod: CPTII,S$GLB,, | Performed by: SURGERY

## 2021-08-25 PROCEDURE — 3044F HG A1C LEVEL LT 7.0%: CPT | Mod: CPTII,S$GLB,, | Performed by: SURGERY

## 2021-08-25 PROCEDURE — 4010F ACE/ARB THERAPY RXD/TAKEN: CPT | Mod: CPTII,S$GLB,, | Performed by: SURGERY

## 2021-08-25 PROCEDURE — 99024 PR POST-OP FOLLOW-UP VISIT: ICD-10-PCS | Mod: S$GLB,,, | Performed by: SURGERY

## 2021-08-25 PROCEDURE — 4010F PR ACE/ARB THEARPY RXD/TAKEN: ICD-10-PCS | Mod: CPTII,S$GLB,, | Performed by: SURGERY

## 2021-08-25 PROCEDURE — 99024 POSTOP FOLLOW-UP VISIT: CPT | Mod: S$GLB,,, | Performed by: SURGERY

## 2021-08-25 PROCEDURE — 1159F MED LIST DOCD IN RCRD: CPT | Mod: CPTII,S$GLB,, | Performed by: SURGERY

## 2021-08-25 PROCEDURE — 1160F PR REVIEW ALL MEDS BY PRESCRIBER/CLIN PHARMACIST DOCUMENTED: ICD-10-PCS | Mod: CPTII,S$GLB,, | Performed by: SURGERY

## 2021-08-25 PROCEDURE — 3074F SYST BP LT 130 MM HG: CPT | Mod: CPTII,S$GLB,, | Performed by: SURGERY

## 2021-08-25 PROCEDURE — 3074F PR MOST RECENT SYSTOLIC BLOOD PRESSURE < 130 MM HG: ICD-10-PCS | Mod: CPTII,S$GLB,, | Performed by: SURGERY

## 2021-08-25 PROCEDURE — 3078F DIAST BP <80 MM HG: CPT | Mod: CPTII,S$GLB,, | Performed by: SURGERY

## 2021-08-25 PROCEDURE — 3044F PR MOST RECENT HEMOGLOBIN A1C LEVEL <7.0%: ICD-10-PCS | Mod: CPTII,S$GLB,, | Performed by: SURGERY

## 2021-08-25 PROCEDURE — 99999 PR PBB SHADOW E&M-EST. PATIENT-LVL III: CPT | Mod: PBBFAC,,, | Performed by: SURGERY

## 2021-08-25 PROCEDURE — 1159F PR MEDICATION LIST DOCUMENTED IN MEDICAL RECORD: ICD-10-PCS | Mod: CPTII,S$GLB,, | Performed by: SURGERY

## 2021-08-25 PROCEDURE — 99999 PR PBB SHADOW E&M-EST. PATIENT-LVL III: ICD-10-PCS | Mod: PBBFAC,,, | Performed by: SURGERY

## 2021-08-25 PROCEDURE — 3078F PR MOST RECENT DIASTOLIC BLOOD PRESSURE < 80 MM HG: ICD-10-PCS | Mod: CPTII,S$GLB,, | Performed by: SURGERY

## 2021-09-03 ENCOUNTER — TELEPHONE (OUTPATIENT)
Dept: SURGERY | Facility: HOSPITAL | Age: 56
End: 2021-09-03

## 2021-09-08 ENCOUNTER — OFFICE VISIT (OUTPATIENT)
Dept: PLASTIC SURGERY | Facility: CLINIC | Age: 56
End: 2021-09-08
Payer: COMMERCIAL

## 2021-09-08 VITALS — SYSTOLIC BLOOD PRESSURE: 127 MMHG | HEART RATE: 95 BPM | DIASTOLIC BLOOD PRESSURE: 74 MMHG

## 2021-09-08 DIAGNOSIS — Z09 SURGERY FOLLOW-UP EXAMINATION: Primary | ICD-10-CM

## 2021-09-08 PROCEDURE — 1159F MED LIST DOCD IN RCRD: CPT | Mod: CPTII,S$GLB,, | Performed by: SURGERY

## 2021-09-08 PROCEDURE — 99024 POSTOP FOLLOW-UP VISIT: CPT | Mod: S$GLB,,, | Performed by: SURGERY

## 2021-09-08 PROCEDURE — 1160F RVW MEDS BY RX/DR IN RCRD: CPT | Mod: CPTII,S$GLB,, | Performed by: SURGERY

## 2021-09-08 PROCEDURE — 4010F ACE/ARB THERAPY RXD/TAKEN: CPT | Mod: CPTII,S$GLB,, | Performed by: SURGERY

## 2021-09-08 PROCEDURE — 1160F PR REVIEW ALL MEDS BY PRESCRIBER/CLIN PHARMACIST DOCUMENTED: ICD-10-PCS | Mod: CPTII,S$GLB,, | Performed by: SURGERY

## 2021-09-08 PROCEDURE — 4010F PR ACE/ARB THEARPY RXD/TAKEN: ICD-10-PCS | Mod: CPTII,S$GLB,, | Performed by: SURGERY

## 2021-09-08 PROCEDURE — 99999 PR PBB SHADOW E&M-EST. PATIENT-LVL III: CPT | Mod: PBBFAC,,, | Performed by: SURGERY

## 2021-09-08 PROCEDURE — 3044F HG A1C LEVEL LT 7.0%: CPT | Mod: CPTII,S$GLB,, | Performed by: SURGERY

## 2021-09-08 PROCEDURE — 3078F PR MOST RECENT DIASTOLIC BLOOD PRESSURE < 80 MM HG: ICD-10-PCS | Mod: CPTII,S$GLB,, | Performed by: SURGERY

## 2021-09-08 PROCEDURE — 99024 PR POST-OP FOLLOW-UP VISIT: ICD-10-PCS | Mod: S$GLB,,, | Performed by: SURGERY

## 2021-09-08 PROCEDURE — 3078F DIAST BP <80 MM HG: CPT | Mod: CPTII,S$GLB,, | Performed by: SURGERY

## 2021-09-08 PROCEDURE — 99999 PR PBB SHADOW E&M-EST. PATIENT-LVL III: ICD-10-PCS | Mod: PBBFAC,,, | Performed by: SURGERY

## 2021-09-08 PROCEDURE — 3074F PR MOST RECENT SYSTOLIC BLOOD PRESSURE < 130 MM HG: ICD-10-PCS | Mod: CPTII,S$GLB,, | Performed by: SURGERY

## 2021-09-08 PROCEDURE — 3044F PR MOST RECENT HEMOGLOBIN A1C LEVEL <7.0%: ICD-10-PCS | Mod: CPTII,S$GLB,, | Performed by: SURGERY

## 2021-09-08 PROCEDURE — 1159F PR MEDICATION LIST DOCUMENTED IN MEDICAL RECORD: ICD-10-PCS | Mod: CPTII,S$GLB,, | Performed by: SURGERY

## 2021-09-08 PROCEDURE — 3074F SYST BP LT 130 MM HG: CPT | Mod: CPTII,S$GLB,, | Performed by: SURGERY

## 2021-09-14 ENCOUNTER — OFFICE VISIT (OUTPATIENT)
Dept: WOUND CARE | Facility: CLINIC | Age: 56
End: 2021-09-14
Payer: COMMERCIAL

## 2021-09-14 ENCOUNTER — OFFICE VISIT (OUTPATIENT)
Dept: SURGERY | Facility: CLINIC | Age: 56
End: 2021-09-14
Payer: COMMERCIAL

## 2021-09-14 VITALS
WEIGHT: 177.5 LBS | SYSTOLIC BLOOD PRESSURE: 141 MMHG | HEART RATE: 100 BPM | HEIGHT: 65 IN | DIASTOLIC BLOOD PRESSURE: 77 MMHG | BODY MASS INDEX: 29.57 KG/M2

## 2021-09-14 DIAGNOSIS — Z43.3 ATTENTION TO COLOSTOMY: Primary | ICD-10-CM

## 2021-09-14 DIAGNOSIS — C20 RECTAL CANCER: Primary | ICD-10-CM

## 2021-09-14 PROCEDURE — 1159F MED LIST DOCD IN RCRD: CPT | Mod: CPTII,S$GLB,, | Performed by: COLON & RECTAL SURGERY

## 2021-09-14 PROCEDURE — 3008F BODY MASS INDEX DOCD: CPT | Mod: CPTII,S$GLB,, | Performed by: COLON & RECTAL SURGERY

## 2021-09-14 PROCEDURE — 3044F PR MOST RECENT HEMOGLOBIN A1C LEVEL <7.0%: ICD-10-PCS | Mod: CPTII,S$GLB,, | Performed by: CLINICAL NURSE SPECIALIST

## 2021-09-14 PROCEDURE — 4010F PR ACE/ARB THEARPY RXD/TAKEN: ICD-10-PCS | Mod: CPTII,S$GLB,, | Performed by: COLON & RECTAL SURGERY

## 2021-09-14 PROCEDURE — 3044F HG A1C LEVEL LT 7.0%: CPT | Mod: CPTII,S$GLB,, | Performed by: CLINICAL NURSE SPECIALIST

## 2021-09-14 PROCEDURE — 3078F DIAST BP <80 MM HG: CPT | Mod: CPTII,S$GLB,, | Performed by: COLON & RECTAL SURGERY

## 2021-09-14 PROCEDURE — 1159F MED LIST DOCD IN RCRD: CPT | Mod: CPTII,S$GLB,, | Performed by: CLINICAL NURSE SPECIALIST

## 2021-09-14 PROCEDURE — 3077F SYST BP >= 140 MM HG: CPT | Mod: CPTII,S$GLB,, | Performed by: COLON & RECTAL SURGERY

## 2021-09-14 PROCEDURE — 3044F PR MOST RECENT HEMOGLOBIN A1C LEVEL <7.0%: ICD-10-PCS | Mod: CPTII,S$GLB,, | Performed by: COLON & RECTAL SURGERY

## 2021-09-14 PROCEDURE — 99024 PR POST-OP FOLLOW-UP VISIT: ICD-10-PCS | Mod: S$GLB,,, | Performed by: CLINICAL NURSE SPECIALIST

## 2021-09-14 PROCEDURE — 3044F HG A1C LEVEL LT 7.0%: CPT | Mod: CPTII,S$GLB,, | Performed by: COLON & RECTAL SURGERY

## 2021-09-14 PROCEDURE — 99999 PR PBB SHADOW E&M-EST. PATIENT-LVL II: ICD-10-PCS | Mod: PBBFAC,,, | Performed by: CLINICAL NURSE SPECIALIST

## 2021-09-14 PROCEDURE — 99999 PR PBB SHADOW E&M-EST. PATIENT-LVL III: CPT | Mod: PBBFAC,,, | Performed by: COLON & RECTAL SURGERY

## 2021-09-14 PROCEDURE — 99999 PR PBB SHADOW E&M-EST. PATIENT-LVL III: ICD-10-PCS | Mod: PBBFAC,,, | Performed by: COLON & RECTAL SURGERY

## 2021-09-14 PROCEDURE — 4010F PR ACE/ARB THEARPY RXD/TAKEN: ICD-10-PCS | Mod: CPTII,S$GLB,, | Performed by: CLINICAL NURSE SPECIALIST

## 2021-09-14 PROCEDURE — 1160F PR REVIEW ALL MEDS BY PRESCRIBER/CLIN PHARMACIST DOCUMENTED: ICD-10-PCS | Mod: CPTII,S$GLB,, | Performed by: COLON & RECTAL SURGERY

## 2021-09-14 PROCEDURE — 99999 PR PBB SHADOW E&M-EST. PATIENT-LVL II: CPT | Mod: PBBFAC,,, | Performed by: CLINICAL NURSE SPECIALIST

## 2021-09-14 PROCEDURE — 3077F PR MOST RECENT SYSTOLIC BLOOD PRESSURE >= 140 MM HG: ICD-10-PCS | Mod: CPTII,S$GLB,, | Performed by: COLON & RECTAL SURGERY

## 2021-09-14 PROCEDURE — 4010F ACE/ARB THERAPY RXD/TAKEN: CPT | Mod: CPTII,S$GLB,, | Performed by: CLINICAL NURSE SPECIALIST

## 2021-09-14 PROCEDURE — 1160F RVW MEDS BY RX/DR IN RCRD: CPT | Mod: CPTII,S$GLB,, | Performed by: CLINICAL NURSE SPECIALIST

## 2021-09-14 PROCEDURE — 1160F RVW MEDS BY RX/DR IN RCRD: CPT | Mod: CPTII,S$GLB,, | Performed by: COLON & RECTAL SURGERY

## 2021-09-14 PROCEDURE — 3008F PR BODY MASS INDEX (BMI) DOCUMENTED: ICD-10-PCS | Mod: CPTII,S$GLB,, | Performed by: COLON & RECTAL SURGERY

## 2021-09-14 PROCEDURE — 1159F PR MEDICATION LIST DOCUMENTED IN MEDICAL RECORD: ICD-10-PCS | Mod: CPTII,S$GLB,, | Performed by: COLON & RECTAL SURGERY

## 2021-09-14 PROCEDURE — 99024 POSTOP FOLLOW-UP VISIT: CPT | Mod: S$GLB,,, | Performed by: COLON & RECTAL SURGERY

## 2021-09-14 PROCEDURE — 4010F ACE/ARB THERAPY RXD/TAKEN: CPT | Mod: CPTII,S$GLB,, | Performed by: COLON & RECTAL SURGERY

## 2021-09-14 PROCEDURE — 99024 PR POST-OP FOLLOW-UP VISIT: ICD-10-PCS | Mod: S$GLB,,, | Performed by: COLON & RECTAL SURGERY

## 2021-09-14 PROCEDURE — 99024 POSTOP FOLLOW-UP VISIT: CPT | Mod: S$GLB,,, | Performed by: CLINICAL NURSE SPECIALIST

## 2021-09-14 PROCEDURE — 1160F PR REVIEW ALL MEDS BY PRESCRIBER/CLIN PHARMACIST DOCUMENTED: ICD-10-PCS | Mod: CPTII,S$GLB,, | Performed by: CLINICAL NURSE SPECIALIST

## 2021-09-14 PROCEDURE — 1159F PR MEDICATION LIST DOCUMENTED IN MEDICAL RECORD: ICD-10-PCS | Mod: CPTII,S$GLB,, | Performed by: CLINICAL NURSE SPECIALIST

## 2021-09-14 PROCEDURE — 3078F PR MOST RECENT DIASTOLIC BLOOD PRESSURE < 80 MM HG: ICD-10-PCS | Mod: CPTII,S$GLB,, | Performed by: COLON & RECTAL SURGERY

## 2021-09-15 ENCOUNTER — OFFICE VISIT (OUTPATIENT)
Dept: GYNECOLOGIC ONCOLOGY | Facility: CLINIC | Age: 56
End: 2021-09-15
Payer: COMMERCIAL

## 2021-09-15 VITALS
DIASTOLIC BLOOD PRESSURE: 78 MMHG | BODY MASS INDEX: 29.51 KG/M2 | SYSTOLIC BLOOD PRESSURE: 119 MMHG | WEIGHT: 177.38 LBS | HEART RATE: 100 BPM

## 2021-09-15 DIAGNOSIS — C20 RECTAL CANCER: Primary | ICD-10-CM

## 2021-09-15 PROCEDURE — 99999 PR PBB SHADOW E&M-EST. PATIENT-LVL III: CPT | Mod: PBBFAC,,, | Performed by: OBSTETRICS & GYNECOLOGY

## 2021-09-15 PROCEDURE — 1159F MED LIST DOCD IN RCRD: CPT | Mod: CPTII,S$GLB,, | Performed by: OBSTETRICS & GYNECOLOGY

## 2021-09-15 PROCEDURE — 4010F PR ACE/ARB THEARPY RXD/TAKEN: ICD-10-PCS | Mod: CPTII,S$GLB,, | Performed by: OBSTETRICS & GYNECOLOGY

## 2021-09-15 PROCEDURE — 1159F PR MEDICATION LIST DOCUMENTED IN MEDICAL RECORD: ICD-10-PCS | Mod: CPTII,S$GLB,, | Performed by: OBSTETRICS & GYNECOLOGY

## 2021-09-15 PROCEDURE — 3044F HG A1C LEVEL LT 7.0%: CPT | Mod: CPTII,S$GLB,, | Performed by: OBSTETRICS & GYNECOLOGY

## 2021-09-15 PROCEDURE — 3078F DIAST BP <80 MM HG: CPT | Mod: CPTII,S$GLB,, | Performed by: OBSTETRICS & GYNECOLOGY

## 2021-09-15 PROCEDURE — 99999 PR PBB SHADOW E&M-EST. PATIENT-LVL III: ICD-10-PCS | Mod: PBBFAC,,, | Performed by: OBSTETRICS & GYNECOLOGY

## 2021-09-15 PROCEDURE — 3074F PR MOST RECENT SYSTOLIC BLOOD PRESSURE < 130 MM HG: ICD-10-PCS | Mod: CPTII,S$GLB,, | Performed by: OBSTETRICS & GYNECOLOGY

## 2021-09-15 PROCEDURE — 1160F RVW MEDS BY RX/DR IN RCRD: CPT | Mod: CPTII,S$GLB,, | Performed by: OBSTETRICS & GYNECOLOGY

## 2021-09-15 PROCEDURE — 99024 PR POST-OP FOLLOW-UP VISIT: ICD-10-PCS | Mod: S$GLB,,, | Performed by: OBSTETRICS & GYNECOLOGY

## 2021-09-15 PROCEDURE — 4010F ACE/ARB THERAPY RXD/TAKEN: CPT | Mod: CPTII,S$GLB,, | Performed by: OBSTETRICS & GYNECOLOGY

## 2021-09-15 PROCEDURE — 3044F PR MOST RECENT HEMOGLOBIN A1C LEVEL <7.0%: ICD-10-PCS | Mod: CPTII,S$GLB,, | Performed by: OBSTETRICS & GYNECOLOGY

## 2021-09-15 PROCEDURE — 3074F SYST BP LT 130 MM HG: CPT | Mod: CPTII,S$GLB,, | Performed by: OBSTETRICS & GYNECOLOGY

## 2021-09-15 PROCEDURE — 99024 POSTOP FOLLOW-UP VISIT: CPT | Mod: S$GLB,,, | Performed by: OBSTETRICS & GYNECOLOGY

## 2021-09-15 PROCEDURE — 3008F BODY MASS INDEX DOCD: CPT | Mod: CPTII,S$GLB,, | Performed by: OBSTETRICS & GYNECOLOGY

## 2021-09-15 PROCEDURE — 3078F PR MOST RECENT DIASTOLIC BLOOD PRESSURE < 80 MM HG: ICD-10-PCS | Mod: CPTII,S$GLB,, | Performed by: OBSTETRICS & GYNECOLOGY

## 2021-09-15 PROCEDURE — 3008F PR BODY MASS INDEX (BMI) DOCUMENTED: ICD-10-PCS | Mod: CPTII,S$GLB,, | Performed by: OBSTETRICS & GYNECOLOGY

## 2021-09-15 PROCEDURE — 1160F PR REVIEW ALL MEDS BY PRESCRIBER/CLIN PHARMACIST DOCUMENTED: ICD-10-PCS | Mod: CPTII,S$GLB,, | Performed by: OBSTETRICS & GYNECOLOGY

## 2021-09-21 ENCOUNTER — EXTERNAL HOME HEALTH (OUTPATIENT)
Dept: HOME HEALTH SERVICES | Facility: HOSPITAL | Age: 56
End: 2021-09-21
Payer: COMMERCIAL

## 2021-09-27 ENCOUNTER — DOCUMENT SCAN (OUTPATIENT)
Dept: HOME HEALTH SERVICES | Facility: HOSPITAL | Age: 56
End: 2021-09-27
Payer: COMMERCIAL

## 2021-09-29 ENCOUNTER — OFFICE VISIT (OUTPATIENT)
Dept: PLASTIC SURGERY | Facility: CLINIC | Age: 56
End: 2021-09-29
Payer: COMMERCIAL

## 2021-09-29 VITALS
HEIGHT: 65 IN | HEART RATE: 70 BPM | SYSTOLIC BLOOD PRESSURE: 122 MMHG | WEIGHT: 177.25 LBS | BODY MASS INDEX: 29.53 KG/M2 | DIASTOLIC BLOOD PRESSURE: 57 MMHG

## 2021-09-29 DIAGNOSIS — Z09 SURGERY FOLLOW-UP EXAMINATION: Primary | ICD-10-CM

## 2021-09-29 PROCEDURE — 3074F SYST BP LT 130 MM HG: CPT | Mod: CPTII,S$GLB,, | Performed by: SURGERY

## 2021-09-29 PROCEDURE — 99999 PR PBB SHADOW E&M-EST. PATIENT-LVL III: ICD-10-PCS | Mod: PBBFAC,,, | Performed by: SURGERY

## 2021-09-29 PROCEDURE — 3044F HG A1C LEVEL LT 7.0%: CPT | Mod: CPTII,S$GLB,, | Performed by: SURGERY

## 2021-09-29 PROCEDURE — 4010F PR ACE/ARB THEARPY RXD/TAKEN: ICD-10-PCS | Mod: CPTII,S$GLB,, | Performed by: SURGERY

## 2021-09-29 PROCEDURE — 99024 PR POST-OP FOLLOW-UP VISIT: ICD-10-PCS | Mod: S$GLB,,, | Performed by: SURGERY

## 2021-09-29 PROCEDURE — 1160F RVW MEDS BY RX/DR IN RCRD: CPT | Mod: CPTII,S$GLB,, | Performed by: SURGERY

## 2021-09-29 PROCEDURE — 4010F ACE/ARB THERAPY RXD/TAKEN: CPT | Mod: CPTII,S$GLB,, | Performed by: SURGERY

## 2021-09-29 PROCEDURE — 1159F PR MEDICATION LIST DOCUMENTED IN MEDICAL RECORD: ICD-10-PCS | Mod: CPTII,S$GLB,, | Performed by: SURGERY

## 2021-09-29 PROCEDURE — 3008F PR BODY MASS INDEX (BMI) DOCUMENTED: ICD-10-PCS | Mod: CPTII,S$GLB,, | Performed by: SURGERY

## 2021-09-29 PROCEDURE — 3078F PR MOST RECENT DIASTOLIC BLOOD PRESSURE < 80 MM HG: ICD-10-PCS | Mod: CPTII,S$GLB,, | Performed by: SURGERY

## 2021-09-29 PROCEDURE — 99024 POSTOP FOLLOW-UP VISIT: CPT | Mod: S$GLB,,, | Performed by: SURGERY

## 2021-09-29 PROCEDURE — 3008F BODY MASS INDEX DOCD: CPT | Mod: CPTII,S$GLB,, | Performed by: SURGERY

## 2021-09-29 PROCEDURE — 1159F MED LIST DOCD IN RCRD: CPT | Mod: CPTII,S$GLB,, | Performed by: SURGERY

## 2021-09-29 PROCEDURE — 1160F PR REVIEW ALL MEDS BY PRESCRIBER/CLIN PHARMACIST DOCUMENTED: ICD-10-PCS | Mod: CPTII,S$GLB,, | Performed by: SURGERY

## 2021-09-29 PROCEDURE — 3044F PR MOST RECENT HEMOGLOBIN A1C LEVEL <7.0%: ICD-10-PCS | Mod: CPTII,S$GLB,, | Performed by: SURGERY

## 2021-09-29 PROCEDURE — 99999 PR PBB SHADOW E&M-EST. PATIENT-LVL III: CPT | Mod: PBBFAC,,, | Performed by: SURGERY

## 2021-09-29 PROCEDURE — 3078F DIAST BP <80 MM HG: CPT | Mod: CPTII,S$GLB,, | Performed by: SURGERY

## 2021-09-29 PROCEDURE — 3074F PR MOST RECENT SYSTOLIC BLOOD PRESSURE < 130 MM HG: ICD-10-PCS | Mod: CPTII,S$GLB,, | Performed by: SURGERY

## 2021-10-11 ENCOUNTER — TELEPHONE (OUTPATIENT)
Dept: SURGERY | Facility: CLINIC | Age: 56
End: 2021-10-11

## 2021-10-12 ENCOUNTER — OFFICE VISIT (OUTPATIENT)
Dept: SURGERY | Facility: CLINIC | Age: 56
End: 2021-10-12
Payer: COMMERCIAL

## 2021-10-12 ENCOUNTER — OFFICE VISIT (OUTPATIENT)
Dept: WOUND CARE | Facility: CLINIC | Age: 56
End: 2021-10-12
Payer: COMMERCIAL

## 2021-10-12 VITALS
SYSTOLIC BLOOD PRESSURE: 150 MMHG | WEIGHT: 191.38 LBS | BODY MASS INDEX: 31.89 KG/M2 | DIASTOLIC BLOOD PRESSURE: 77 MMHG | HEART RATE: 84 BPM | HEIGHT: 65 IN

## 2021-10-12 DIAGNOSIS — C20 RECTAL CANCER: Primary | ICD-10-CM

## 2021-10-12 DIAGNOSIS — Z43.3 ATTENTION TO COLOSTOMY: Primary | ICD-10-CM

## 2021-10-12 PROCEDURE — 1159F MED LIST DOCD IN RCRD: CPT | Mod: CPTII,S$GLB,, | Performed by: COLON & RECTAL SURGERY

## 2021-10-12 PROCEDURE — 1160F RVW MEDS BY RX/DR IN RCRD: CPT | Mod: CPTII,S$GLB,, | Performed by: CLINICAL NURSE SPECIALIST

## 2021-10-12 PROCEDURE — 3008F PR BODY MASS INDEX (BMI) DOCUMENTED: ICD-10-PCS | Mod: CPTII,S$GLB,, | Performed by: COLON & RECTAL SURGERY

## 2021-10-12 PROCEDURE — 3077F SYST BP >= 140 MM HG: CPT | Mod: CPTII,S$GLB,, | Performed by: COLON & RECTAL SURGERY

## 2021-10-12 PROCEDURE — 99999 PR PBB SHADOW E&M-EST. PATIENT-LVL III: CPT | Mod: PBBFAC,,, | Performed by: COLON & RECTAL SURGERY

## 2021-10-12 PROCEDURE — 3077F PR MOST RECENT SYSTOLIC BLOOD PRESSURE >= 140 MM HG: ICD-10-PCS | Mod: CPTII,S$GLB,, | Performed by: COLON & RECTAL SURGERY

## 2021-10-12 PROCEDURE — 99999 PR PBB SHADOW E&M-EST. PATIENT-LVL III: ICD-10-PCS | Mod: PBBFAC,,, | Performed by: COLON & RECTAL SURGERY

## 2021-10-12 PROCEDURE — 3078F DIAST BP <80 MM HG: CPT | Mod: CPTII,S$GLB,, | Performed by: COLON & RECTAL SURGERY

## 2021-10-12 PROCEDURE — 4010F ACE/ARB THERAPY RXD/TAKEN: CPT | Mod: CPTII,S$GLB,, | Performed by: CLINICAL NURSE SPECIALIST

## 2021-10-12 PROCEDURE — 1159F MED LIST DOCD IN RCRD: CPT | Mod: CPTII,S$GLB,, | Performed by: CLINICAL NURSE SPECIALIST

## 2021-10-12 PROCEDURE — 99024 PR POST-OP FOLLOW-UP VISIT: ICD-10-PCS | Mod: S$GLB,,, | Performed by: CLINICAL NURSE SPECIALIST

## 2021-10-12 PROCEDURE — 4010F PR ACE/ARB THEARPY RXD/TAKEN: ICD-10-PCS | Mod: CPTII,S$GLB,, | Performed by: COLON & RECTAL SURGERY

## 2021-10-12 PROCEDURE — 3008F BODY MASS INDEX DOCD: CPT | Mod: CPTII,S$GLB,, | Performed by: COLON & RECTAL SURGERY

## 2021-10-12 PROCEDURE — 99024 POSTOP FOLLOW-UP VISIT: CPT | Mod: S$GLB,,, | Performed by: COLON & RECTAL SURGERY

## 2021-10-12 PROCEDURE — 1160F RVW MEDS BY RX/DR IN RCRD: CPT | Mod: CPTII,S$GLB,, | Performed by: COLON & RECTAL SURGERY

## 2021-10-12 PROCEDURE — 1160F PR REVIEW ALL MEDS BY PRESCRIBER/CLIN PHARMACIST DOCUMENTED: ICD-10-PCS | Mod: CPTII,S$GLB,, | Performed by: CLINICAL NURSE SPECIALIST

## 2021-10-12 PROCEDURE — 3044F HG A1C LEVEL LT 7.0%: CPT | Mod: CPTII,S$GLB,, | Performed by: COLON & RECTAL SURGERY

## 2021-10-12 PROCEDURE — 1159F PR MEDICATION LIST DOCUMENTED IN MEDICAL RECORD: ICD-10-PCS | Mod: CPTII,S$GLB,, | Performed by: COLON & RECTAL SURGERY

## 2021-10-12 PROCEDURE — 3044F PR MOST RECENT HEMOGLOBIN A1C LEVEL <7.0%: ICD-10-PCS | Mod: CPTII,S$GLB,, | Performed by: CLINICAL NURSE SPECIALIST

## 2021-10-12 PROCEDURE — 99999 PR PBB SHADOW E&M-EST. PATIENT-LVL I: CPT | Mod: PBBFAC,,, | Performed by: CLINICAL NURSE SPECIALIST

## 2021-10-12 PROCEDURE — 99999 PR PBB SHADOW E&M-EST. PATIENT-LVL I: ICD-10-PCS | Mod: PBBFAC,,, | Performed by: CLINICAL NURSE SPECIALIST

## 2021-10-12 PROCEDURE — 1159F PR MEDICATION LIST DOCUMENTED IN MEDICAL RECORD: ICD-10-PCS | Mod: CPTII,S$GLB,, | Performed by: CLINICAL NURSE SPECIALIST

## 2021-10-12 PROCEDURE — 1160F PR REVIEW ALL MEDS BY PRESCRIBER/CLIN PHARMACIST DOCUMENTED: ICD-10-PCS | Mod: CPTII,S$GLB,, | Performed by: COLON & RECTAL SURGERY

## 2021-10-12 PROCEDURE — 99024 PR POST-OP FOLLOW-UP VISIT: ICD-10-PCS | Mod: S$GLB,,, | Performed by: COLON & RECTAL SURGERY

## 2021-10-12 PROCEDURE — 99024 POSTOP FOLLOW-UP VISIT: CPT | Mod: S$GLB,,, | Performed by: CLINICAL NURSE SPECIALIST

## 2021-10-12 PROCEDURE — 4010F PR ACE/ARB THEARPY RXD/TAKEN: ICD-10-PCS | Mod: CPTII,S$GLB,, | Performed by: CLINICAL NURSE SPECIALIST

## 2021-10-12 PROCEDURE — 4010F ACE/ARB THERAPY RXD/TAKEN: CPT | Mod: CPTII,S$GLB,, | Performed by: COLON & RECTAL SURGERY

## 2021-10-12 PROCEDURE — 3044F HG A1C LEVEL LT 7.0%: CPT | Mod: CPTII,S$GLB,, | Performed by: CLINICAL NURSE SPECIALIST

## 2021-10-12 PROCEDURE — 3044F PR MOST RECENT HEMOGLOBIN A1C LEVEL <7.0%: ICD-10-PCS | Mod: CPTII,S$GLB,, | Performed by: COLON & RECTAL SURGERY

## 2021-10-12 PROCEDURE — 3078F PR MOST RECENT DIASTOLIC BLOOD PRESSURE < 80 MM HG: ICD-10-PCS | Mod: CPTII,S$GLB,, | Performed by: COLON & RECTAL SURGERY

## 2021-10-27 ENCOUNTER — OFFICE VISIT (OUTPATIENT)
Dept: PLASTIC SURGERY | Facility: CLINIC | Age: 56
End: 2021-10-27
Payer: COMMERCIAL

## 2021-10-27 VITALS
DIASTOLIC BLOOD PRESSURE: 67 MMHG | WEIGHT: 191 LBS | HEART RATE: 77 BPM | HEIGHT: 65 IN | SYSTOLIC BLOOD PRESSURE: 140 MMHG | BODY MASS INDEX: 31.82 KG/M2

## 2021-10-27 DIAGNOSIS — Z09 SURGERY FOLLOW-UP EXAMINATION: Primary | ICD-10-CM

## 2021-10-27 PROCEDURE — 99999 PR PBB SHADOW E&M-EST. PATIENT-LVL III: CPT | Mod: PBBFAC,,, | Performed by: SURGERY

## 2021-10-27 PROCEDURE — 1160F PR REVIEW ALL MEDS BY PRESCRIBER/CLIN PHARMACIST DOCUMENTED: ICD-10-PCS | Mod: CPTII,S$GLB,, | Performed by: SURGERY

## 2021-10-27 PROCEDURE — 1160F RVW MEDS BY RX/DR IN RCRD: CPT | Mod: CPTII,S$GLB,, | Performed by: SURGERY

## 2021-10-27 PROCEDURE — 3078F PR MOST RECENT DIASTOLIC BLOOD PRESSURE < 80 MM HG: ICD-10-PCS | Mod: CPTII,S$GLB,, | Performed by: SURGERY

## 2021-10-27 PROCEDURE — 4010F PR ACE/ARB THEARPY RXD/TAKEN: ICD-10-PCS | Mod: CPTII,S$GLB,, | Performed by: SURGERY

## 2021-10-27 PROCEDURE — 3077F SYST BP >= 140 MM HG: CPT | Mod: CPTII,S$GLB,, | Performed by: SURGERY

## 2021-10-27 PROCEDURE — 99999 PR PBB SHADOW E&M-EST. PATIENT-LVL III: ICD-10-PCS | Mod: PBBFAC,,, | Performed by: SURGERY

## 2021-10-27 PROCEDURE — 99024 POSTOP FOLLOW-UP VISIT: CPT | Mod: S$GLB,,, | Performed by: SURGERY

## 2021-10-27 PROCEDURE — 4010F ACE/ARB THERAPY RXD/TAKEN: CPT | Mod: CPTII,S$GLB,, | Performed by: SURGERY

## 2021-10-27 PROCEDURE — 1159F MED LIST DOCD IN RCRD: CPT | Mod: CPTII,S$GLB,, | Performed by: SURGERY

## 2021-10-27 PROCEDURE — 1159F PR MEDICATION LIST DOCUMENTED IN MEDICAL RECORD: ICD-10-PCS | Mod: CPTII,S$GLB,, | Performed by: SURGERY

## 2021-10-27 PROCEDURE — 99024 PR POST-OP FOLLOW-UP VISIT: ICD-10-PCS | Mod: S$GLB,,, | Performed by: SURGERY

## 2021-10-27 PROCEDURE — 3077F PR MOST RECENT SYSTOLIC BLOOD PRESSURE >= 140 MM HG: ICD-10-PCS | Mod: CPTII,S$GLB,, | Performed by: SURGERY

## 2021-10-27 PROCEDURE — 3078F DIAST BP <80 MM HG: CPT | Mod: CPTII,S$GLB,, | Performed by: SURGERY

## 2021-10-27 PROCEDURE — 3008F BODY MASS INDEX DOCD: CPT | Mod: CPTII,S$GLB,, | Performed by: SURGERY

## 2021-10-27 PROCEDURE — 3044F HG A1C LEVEL LT 7.0%: CPT | Mod: CPTII,S$GLB,, | Performed by: SURGERY

## 2021-10-27 PROCEDURE — 3008F PR BODY MASS INDEX (BMI) DOCUMENTED: ICD-10-PCS | Mod: CPTII,S$GLB,, | Performed by: SURGERY

## 2021-10-27 PROCEDURE — 3044F PR MOST RECENT HEMOGLOBIN A1C LEVEL <7.0%: ICD-10-PCS | Mod: CPTII,S$GLB,, | Performed by: SURGERY

## 2022-01-10 ENCOUNTER — TELEPHONE (OUTPATIENT)
Dept: SURGERY | Facility: CLINIC | Age: 57
End: 2022-01-10
Payer: COMMERCIAL

## 2022-01-10 NOTE — TELEPHONE ENCOUNTER
Received disc in mail from 11/2021 from [facility]. Request for outside CD import form completed and placed in radiology lock box for upload.

## 2022-05-11 ENCOUNTER — OFFICE VISIT (OUTPATIENT)
Dept: PLASTIC SURGERY | Facility: CLINIC | Age: 57
End: 2022-05-11
Payer: COMMERCIAL

## 2022-05-11 VITALS
SYSTOLIC BLOOD PRESSURE: 132 MMHG | HEART RATE: 68 BPM | WEIGHT: 191 LBS | HEIGHT: 65 IN | BODY MASS INDEX: 31.82 KG/M2 | DIASTOLIC BLOOD PRESSURE: 61 MMHG

## 2022-05-11 DIAGNOSIS — Z09 SURGERY FOLLOW-UP EXAMINATION: Primary | ICD-10-CM

## 2022-05-11 PROCEDURE — 1159F MED LIST DOCD IN RCRD: CPT | Mod: CPTII,S$GLB,, | Performed by: SURGERY

## 2022-05-11 PROCEDURE — 3008F PR BODY MASS INDEX (BMI) DOCUMENTED: ICD-10-PCS | Mod: CPTII,S$GLB,, | Performed by: SURGERY

## 2022-05-11 PROCEDURE — 99024 POSTOP FOLLOW-UP VISIT: CPT | Mod: S$GLB,,, | Performed by: SURGERY

## 2022-05-11 PROCEDURE — 4010F ACE/ARB THERAPY RXD/TAKEN: CPT | Mod: CPTII,S$GLB,, | Performed by: SURGERY

## 2022-05-11 PROCEDURE — 3075F SYST BP GE 130 - 139MM HG: CPT | Mod: CPTII,S$GLB,, | Performed by: SURGERY

## 2022-05-11 PROCEDURE — 1160F RVW MEDS BY RX/DR IN RCRD: CPT | Mod: CPTII,S$GLB,, | Performed by: SURGERY

## 2022-05-11 PROCEDURE — 99999 PR PBB SHADOW E&M-EST. PATIENT-LVL III: CPT | Mod: PBBFAC,,, | Performed by: SURGERY

## 2022-05-11 PROCEDURE — 1159F PR MEDICATION LIST DOCUMENTED IN MEDICAL RECORD: ICD-10-PCS | Mod: CPTII,S$GLB,, | Performed by: SURGERY

## 2022-05-11 PROCEDURE — 3078F PR MOST RECENT DIASTOLIC BLOOD PRESSURE < 80 MM HG: ICD-10-PCS | Mod: CPTII,S$GLB,, | Performed by: SURGERY

## 2022-05-11 PROCEDURE — 99999 PR PBB SHADOW E&M-EST. PATIENT-LVL III: ICD-10-PCS | Mod: PBBFAC,,, | Performed by: SURGERY

## 2022-05-11 PROCEDURE — 4010F PR ACE/ARB THEARPY RXD/TAKEN: ICD-10-PCS | Mod: CPTII,S$GLB,, | Performed by: SURGERY

## 2022-05-11 PROCEDURE — 3008F BODY MASS INDEX DOCD: CPT | Mod: CPTII,S$GLB,, | Performed by: SURGERY

## 2022-05-11 PROCEDURE — 3075F PR MOST RECENT SYSTOLIC BLOOD PRESS GE 130-139MM HG: ICD-10-PCS | Mod: CPTII,S$GLB,, | Performed by: SURGERY

## 2022-05-11 PROCEDURE — 3078F DIAST BP <80 MM HG: CPT | Mod: CPTII,S$GLB,, | Performed by: SURGERY

## 2022-05-11 PROCEDURE — 1160F PR REVIEW ALL MEDS BY PRESCRIBER/CLIN PHARMACIST DOCUMENTED: ICD-10-PCS | Mod: CPTII,S$GLB,, | Performed by: SURGERY

## 2022-05-11 PROCEDURE — 99024 PR POST-OP FOLLOW-UP VISIT: ICD-10-PCS | Mod: S$GLB,,, | Performed by: SURGERY

## 2022-05-11 RX ORDER — LIDOCAINE AND PRILOCAINE 25; 25 MG/G; MG/G
CREAM TOPICAL
COMMUNITY
Start: 2022-01-17

## 2022-05-11 NOTE — PROGRESS NOTES
Surgery Clinic Note    Subjective:     Meron Lamar is a 56 y.o. female who presents to clinic for follow up s/p near total vaginal reconstruction using both the vertical rectus abdominis myocutaneous flap as well as a [place] flap.  She has done well since being seen in clinic 6 months ago. She is not having any issues with pain. She does have a small amount of drainage that uses a pad for but no purulence or blood.       Objective:     PHYSICAL EXAM:  Vital Signs (Most Recent)  Pulse: 68 (05/11/22 1108)  BP: 132/61 (05/11/22 1108)    ROS A 10+ review of systems was performed with pertinent positives and negatives noted above in the history of present illness.  Other systems were negative unless otherwise specified.    Physical Exam:  General: no acute distress  : flap well healed    Assessment:     56 y.o. female with s/p near total vaginal reconstruction using both the vertical rectus abdominis myocutaneous flap as well as a [place] flap. Completely healed.    Plan:     - Follow up PRN      Ruchi Vázquez MD   [facility]

## 2022-07-28 ENCOUNTER — PATIENT MESSAGE (OUTPATIENT)
Dept: GYNECOLOGIC ONCOLOGY | Facility: CLINIC | Age: 57
End: 2022-07-28
Payer: COMMERCIAL

## 2022-07-28 ENCOUNTER — PATIENT MESSAGE (OUTPATIENT)
Dept: SURGERY | Facility: CLINIC | Age: 57
End: 2022-07-28
Payer: COMMERCIAL

## 2022-07-28 DIAGNOSIS — Z85.048 HISTORY OF RECTAL CANCER: Primary | ICD-10-CM

## 2022-07-29 ENCOUNTER — TELEPHONE (OUTPATIENT)
Dept: ENDOSCOPY | Facility: HOSPITAL | Age: 57
End: 2022-07-29
Payer: COMMERCIAL

## 2022-08-01 ENCOUNTER — PATIENT MESSAGE (OUTPATIENT)
Dept: ENDOSCOPY | Facility: HOSPITAL | Age: 57
End: 2022-08-01
Payer: COMMERCIAL

## 2022-08-01 DIAGNOSIS — Z12.11 SPECIAL SCREENING FOR MALIGNANT NEOPLASMS, COLON: Primary | ICD-10-CM

## 2022-08-01 RX ORDER — POLYETHYLENE GLYCOL 3350, SODIUM SULFATE ANHYDROUS, SODIUM BICARBONATE, SODIUM CHLORIDE, POTASSIUM CHLORIDE 236; 22.74; 6.74; 5.86; 2.97 G/4L; G/4L; G/4L; G/4L; G/4L
4 POWDER, FOR SOLUTION ORAL ONCE
Qty: 4000 ML | Refills: 0 | Status: SHIPPED | OUTPATIENT
Start: 2022-08-01 | End: 2022-08-01

## 2022-09-09 ENCOUNTER — ANESTHESIA EVENT (OUTPATIENT)
Dept: ENDOSCOPY | Facility: HOSPITAL | Age: 57
End: 2022-09-09
Payer: COMMERCIAL

## 2022-09-09 ENCOUNTER — ANESTHESIA (OUTPATIENT)
Dept: ENDOSCOPY | Facility: HOSPITAL | Age: 57
End: 2022-09-09
Payer: COMMERCIAL

## 2022-09-09 ENCOUNTER — HOSPITAL ENCOUNTER (OUTPATIENT)
Facility: HOSPITAL | Age: 57
Discharge: HOME | End: 2022-09-09
Attending: COLON & RECTAL SURGERY | Admitting: COLON & RECTAL SURGERY
Payer: COMMERCIAL

## 2022-09-09 VITALS
RESPIRATION RATE: 16 BRPM | TEMPERATURE: 98 F | HEIGHT: 65 IN | DIASTOLIC BLOOD PRESSURE: 70 MMHG | SYSTOLIC BLOOD PRESSURE: 125 MMHG | OXYGEN SATURATION: 98 % | HEART RATE: 73 BPM | BODY MASS INDEX: 35.49 KG/M2 | WEIGHT: 213 LBS

## 2022-09-09 DIAGNOSIS — Z12.11 ENCOUNTER FOR SCREENING COLONOSCOPY: ICD-10-CM

## 2022-09-09 PROCEDURE — 44388 PR COLONOSCOPY THRU STOMA: ICD-10-PCS | Mod: ,,, | Performed by: COLON & RECTAL SURGERY

## 2022-09-09 PROCEDURE — 63600175 PHARM REV CODE 636 W HCPCS: Performed by: NURSE ANESTHETIST, CERTIFIED REGISTERED

## 2022-09-09 PROCEDURE — 44388 COLONOSCOPY THRU STOMA SPX: CPT | Mod: ,,, | Performed by: COLON & RECTAL SURGERY

## 2022-09-09 PROCEDURE — 44388 COLONOSCOPY THRU STOMA SPX: CPT | Performed by: COLON & RECTAL SURGERY

## 2022-09-09 PROCEDURE — 37000008 HC ANESTHESIA 1ST 15 MINUTES: Performed by: COLON & RECTAL SURGERY

## 2022-09-09 PROCEDURE — E9220 PRA ENDO ANESTHESIA: HCPCS | Mod: ,,, | Performed by: NURSE ANESTHETIST, CERTIFIED REGISTERED

## 2022-09-09 PROCEDURE — 63600175 PHARM REV CODE 636 W HCPCS: Performed by: COLON & RECTAL SURGERY

## 2022-09-09 PROCEDURE — 25000003 PHARM REV CODE 250: Performed by: COLON & RECTAL SURGERY

## 2022-09-09 PROCEDURE — E9220 PRA ENDO ANESTHESIA: ICD-10-PCS | Mod: ,,, | Performed by: NURSE ANESTHETIST, CERTIFIED REGISTERED

## 2022-09-09 PROCEDURE — 37000009 HC ANESTHESIA EA ADD 15 MINS: Performed by: COLON & RECTAL SURGERY

## 2022-09-09 RX ORDER — HEPARIN 100 UNIT/ML
5 SYRINGE INTRAVENOUS ONCE
Status: COMPLETED | OUTPATIENT
Start: 2022-09-09 | End: 2022-09-09

## 2022-09-09 RX ORDER — PROPOFOL 10 MG/ML
VIAL (ML) INTRAVENOUS CONTINUOUS PRN
Status: DISCONTINUED | OUTPATIENT
Start: 2022-09-09 | End: 2022-09-09

## 2022-09-09 RX ORDER — PROPOFOL 10 MG/ML
VIAL (ML) INTRAVENOUS
Status: DISCONTINUED | OUTPATIENT
Start: 2022-09-09 | End: 2022-09-09

## 2022-09-09 RX ORDER — SODIUM CHLORIDE 9 MG/ML
INJECTION, SOLUTION INTRAVENOUS CONTINUOUS
Status: DISCONTINUED | OUTPATIENT
Start: 2022-09-09 | End: 2022-09-09 | Stop reason: HOSPADM

## 2022-09-09 RX ADMIN — HEPARIN SODIUM (PORCINE) LOCK FLUSH IV SOLN 100 UNIT/ML 500 UNITS: 100 SOLUTION at 01:09

## 2022-09-09 RX ADMIN — Medication 150 MCG/KG/MIN: at 11:09

## 2022-09-09 RX ADMIN — PROPOFOL 100 MG: 10 INJECTION, EMULSION INTRAVENOUS at 11:09

## 2022-09-09 RX ADMIN — SODIUM CHLORIDE: 0.9 INJECTION, SOLUTION INTRAVENOUS at 10:09

## 2022-09-09 NOTE — PROVATION PATIENT INSTRUCTIONS
Discharge Summary/Instructions after an Endoscopic Procedure  Patient Name: Meron Lamar  Patient MRN: [id number]  Patient YOB: 1965  Friday, September 9, 2022  Blake Jacobson MD  Dear patient,  As a result of recent federal legislation (The Federal Cures Act), you may   receive lab or pathology results from your procedure in your [facility]   account before your physician is able to contact you. Your physician or   their representative will relay the results to you with their   recommendations at their soonest availability.  Thank you,  RESTRICTIONS:  During your procedure today, you received medications for sedation.  These   medications may affect your judgment, balance and coordination.  Therefore,   for 24 hours, you have the following restrictions:   - DO NOT drive a car, operate machinery, make legal/financial decisions,   sign important papers or drink alcohol.    ACTIVITY:  Today: no heavy lifting, straining or running due to procedural   sedation/anesthesia.  The following day: return to full activity including work.  DIET:  Eat and drink normally unless instructed otherwise.     TREATMENT FOR COMMON SIDE EFFECTS:  - Mild abdominal pain, nausea, belching, bloating or excessive gas:  rest,   eat lightly and use a heating pad.  - Sore Throat: treat with throat lozenges and/or gargle with warm salt   water.  - Because air was used during the procedure, expelling large amounts of air   from your rectum or belching is normal.  - If a bowel prep was taken, you may not have a bowel movement for 1-3 days.    This is normal.  SYMPTOMS TO WATCH FOR AND REPORT TO YOUR PHYSICIAN:  1. Abdominal pain or bloating, other than gas cramps.  2. Chest pain.  3. Back pain.  4. Signs of infection such as: chills or fever occurring within 24 hours   after the procedure.  5. Rectal bleeding, which would show as bright red, maroon, or black stools.   (A tablespoon of blood from the rectum is not serious,  especially if   hemorrhoids are present.)  6. Vomiting.  7. Weakness or dizziness.  GO DIRECTLY TO THE NEAREST EMERGENCY ROOM IF YOU HAVE ANY OF THE FOLLOWING:      Difficulty breathing              Chills and/or fever over 101 F   Persistent vomiting and/or vomiting blood   Severe abdominal pain   Severe chest pain   Black, tarry stools   Bleeding- more than one tablespoon   Any other symptom or condition that you feel may need urgent attention  Your doctor recommends these additional instructions:  If any biopsies were taken, your doctors clinic will contact you in 1 to 2   weeks with any results.  - Discharge patient to home (ambulatory).   - Resume previous diet.   - Continue present medications.   - Repeat colonoscopy in 3 years for screening purposes.  For questions, problems or results please call your physician - Blake Jacobson MD at Work:  [phone].  [facility], EMERGENCY ROOM PHONE NUMBER: [phone]  IF A COMPLICATION OR EMERGENCY SITUATION ARISES AND YOU ARE UNABLE TO REACH   YOUR PHYSICIAN - GO DIRECTLY TO THE EMERGENCY ROOM.  Blake Jacobson MD  9/9/2022 11:47:09 AM  This report has been verified and signed electronically.  Dear patient,  As a result of recent federal legislation (The Federal Cures Act), you may   receive lab or pathology results from your procedure in your [facility]   account before your physician is able to contact you. Your physician or   their representative will relay the results to you with their   recommendations at their soonest availability.  Thank you,  PROVATION

## 2022-09-09 NOTE — ANESTHESIA PREPROCEDURE EVALUATION
09/09/2022  Meron Lamar is a 57 y.o., female.  Past Medical History:   Diagnosis Date    Colon cancer Oc t 2020    rectal     Depression     Diabetes mellitus     pre diabetic    Hypertension     Postoperative hypothyroidism 10/6/2020    Rectal cancer 10/6/2020    Rectal cancer     Renal disorder     kidney stone    Sleep apnea     c pap machine in use    Thyroid disease      Past Surgical History:   Procedure Laterality Date    ABDOMINOPERINEAL RESECTION OF RECTUM N/A 8/16/2021    Procedure: PROCTECTOMY, ABDOMINOPERINEAL;  Surgeon: RAHUL Jacobson MD;  [facility];  Service: Colon and Rectal;  Laterality: N/A;  ERAS high    CREATION OF MUSCLE ROTATIONAL FLAP  8/16/2021    Procedure: CREATION, FLAP, MUSCLE ROTATION;  Surgeon: RAHUL Jacobson MD;  [facility];  Service: Colon and Rectal;;    CREATION OF MUSCLE ROTATIONAL FLAP N/A 8/16/2021    Procedure: CREATION, FLAP, MUSCLE ROTATION;  Surgeon: Nicholas Saez MD;  Location: [facility] OR [address] [place] [address];  Service: Plastics;  Laterality: N/A;  Vertical rectus abdominis flap    ESOPHAGOGASTRODUODENOSCOPY      EXAMINATION UNDER ANESTHESIA N/A 10/12/2020    Procedure: Exam under anesthesia, flex sig;  Surgeon: Blake Jacobson MD;  Location: [facility] OR [address] [place] [address];  Service: Endoscopy;  Laterality: N/A;  Rectal and vaginal areas    FISTULA REPAIR      anal fistula    FLAP PROCEDURE N/A 8/16/2021    Procedure: Fascia flap creation;  Surgeon: Nicholas Saez MD;  Location: [facility] OR [address] [place] [address];  Service: Plastics;  Laterality: N/A;    FLEXIBLE SIGMOIDOSCOPY  10/12/2020    Procedure: SIGMOIDOSCOPY, FLEXIBLE;  Surgeon: Blake Jacobson MD;  Location: [facility] OR [address] [place] [address];  Service: Endoscopy;;    FUSION OF CERVICAL SPINE BY POSTERIOR APPROACH      anterior and posterior    RADIOFREQUENCY ABLATION OF LIVER  LESION  4/6/2021    Procedure: RADIOFREQUENCY ABLATION, LESION, LIVER Robotic of segment 8 ;  Surgeon: Luciano Castaneda MD;  [facility] 2ND FLR;  Service: General;;    REVISION OF COLOSTOMY WITH REPAIR OF PARACOLOSTOMY HERNIA N/A 8/16/2021    Procedure: REVISION, COLOSTOMY, WITH PARACOLOSTOMY HERNIA REPAIR;  Surgeon: RAHUL Jacboson MD;  Location: [facility] OR 2ND FLR;  Service: Colon and Rectal;  Laterality: N/A;    ROBOT-ASSISTED LAPAROSCOPIC PARTIAL SURGICAL REMOVAL OF LIVER USING DA MARBELLA XI N/A 4/6/2021    Procedure: XI ROBOTIC RESECTION, LIVER - RIGHT POSTERIOR - NEED DROP IN BK PROBE;  Surgeon: Luciano Castaneda MD;  [facility] 2ND FLR;  Service: General;  Laterality: N/A;    TOTAL ABDOMINAL HYSTERECTOMY W/ BILATERAL SALPINGOOPHORECTOMY N/A 8/16/2021    Procedure: HYSTERECTOMY, TOTAL, ABDOMINAL, WITH BILATERAL SALPINGO-OOPHORECTOMY;  Surgeon: lAejo Mccullough MD;  Location[facility] 2ND FLR;  Service: OB/GYN;  Laterality: N/A;    VAGINA RECONSTRUCTION SURGERY N/A 8/16/2021    Procedure: RECONSTRUCTION, VAGINA;  Surgeon: Nicholas Saez MD;  Location: [facility] OR 2ND FLR;  Service: Plastics;  Laterality: N/A;    VAGINECTOMY N/A 8/16/2021    Procedure: VAGINECTOMY;  Surgeon: RAHUL Jacobson MD;  Location: [facility] OR 2ND FLR;  Service: Colon and Rectal;  Laterality: N/A;  Posterior         Pre-op Assessment    I have reviewed the Patient Summary Reports.     I have reviewed the Nursing Notes. I have reviewed the NPO Status.   I have reviewed the Medications.     Review of Systems  Anesthesia Hx:  No problems with previous Anesthesia    Social:  Non-Smoker, No Alcohol Use    Cardiovascular:   Hypertension    Pulmonary:   Sleep Apnea    Renal/:   Chronic Renal Disease    Hepatic/GI:   Liver Disease,    Endocrine:   Diabetes Hypothyroidism    Psych:   Psychiatric History          Physical Exam  General: Well nourished, Cooperative, Alert and Oriented    Airway:  Mallampati: II / II  Mouth  Opening: Normal  TM Distance: Normal  Tongue: Normal  Neck ROM: Normal ROM    Dental:  Intact    Chest/Lungs:  Clear to auscultation, Normal Respiratory Rate    Heart:  Rate: Normal  Rhythm: Regular Rhythm        Anesthesia Plan  Type of Anesthesia, risks & benefits discussed:    Anesthesia Type: Gen Natural Airway  Intra-op Monitoring Plan: Standard ASA Monitors  Post Op Pain Control Plan: multimodal analgesia  Induction:  IV  Informed Consent: Informed consent signed with the Patient and all parties understand the risks and agree with anesthesia plan.  All questions answered.   ASA Score: 3  Day of Surgery Review of History & Physical: H&P Update referred to the surgeon/provider.    Ready For Surgery From Anesthesia Perspective.     .

## 2022-09-09 NOTE — ANESTHESIA POSTPROCEDURE EVALUATION
Anesthesia Post Evaluation    Patient: Meron Lamar    Procedure(s) Performed: Procedure(s) (LRB):  COLONOSCOPY through stoma (N/A)    Final Anesthesia Type: general      Patient location during evaluation: PACU  Patient participation: Yes- Able to Participate  Level of consciousness: awake and alert  Post-procedure vital signs: reviewed and stable  Pain management: adequate  Airway patency: patent    PONV status at discharge: No PONV  Anesthetic complications: no      Cardiovascular status: blood pressure returned to baseline  Respiratory status: spontaneous ventilation and room air  Hydration status: euvolemic  Follow-up not needed.          Vitals Value Taken Time   /68 09/09/22 1202   Pulse 76 09/09/22 1202   Resp 16 09/09/22 1202   SpO2 97 % 09/09/22 1202         No case tracking events are documented in the log.      Pain/Shanti Score: Shanti Score: 10 (9/9/2022 12:02 PM)

## 2022-09-09 NOTE — H&P
COLONOSCOPY HISTORY AND PE    Procedure : Colonoscopy    INDICATIONS: asymptomatic screening exam, personal history of rectal cancer, and most recent endoscopic exam 2 years ago    Past Medical History:   Diagnosis Date    Colon cancer Oc t 2020    rectal     Depression     Diabetes mellitus     pre diabetic    Hypertension     Postoperative hypothyroidism 10/6/2020    Rectal cancer 10/6/2020    Rectal cancer     Renal disorder     kidney stone    Sleep apnea     c pap machine in use    Thyroid disease      Past Surgical History:   Procedure Laterality Date    ABDOMINOPERINEAL RESECTION OF RECTUM N/A 8/16/2021    Procedure: PROCTECTOMY, ABDOMINOPERINEAL;  Surgeon: RAHUL Jacobson MD;  Location: [facility] OR [address] [place] [address];  Service: Colon and Rectal;  Laterality: N/A;  ERAS high    CREATION OF MUSCLE ROTATIONAL FLAP  8/16/2021    Procedure: CREATION, FLAP, MUSCLE ROTATION;  Surgeon: RAHUL Jacobson MD;  Location: [facility] OR [address] [place] [address];  Service: Colon and Rectal;;    CREATION OF MUSCLE ROTATIONAL FLAP N/A 8/16/2021    Procedure: CREATION, FLAP, MUSCLE ROTATION;  Surgeon: Nicholas Saez MD;  Location: [facility] OR [address] [place] [address];  Service: Plastics;  Laterality: N/A;  Vertical rectus abdominis flap    ESOPHAGOGASTRODUODENOSCOPY      EXAMINATION UNDER ANESTHESIA N/A 10/12/2020    Procedure: Exam under anesthesia, flex sig;  Surgeon: Blake Jacobson MD;  Location: [address];  Service: Endoscopy;  Laterality: N/A;  Rectal and vaginal areas    FISTULA REPAIR      anal fistula    FLAP PROCEDURE N/A 8/16/2021    Procedure: Fascia flap creation;  Surgeon: Nicholas Saez MD;  Location: [address];  Service: Plastics;  Laterality: N/A;    FLEXIBLE SIGMOIDOSCOPY  10/12/2020    Procedure: SIGMOIDOSCOPY, FLEXIBLE;  Surgeon: Blake Jacobson MD;  Location: [facility] OR [address] [place] [address];  Service: Endoscopy;;    FUSION OF CERVICAL SPINE BY POSTERIOR APPROACH      anterior and posterior    RADIOFREQUENCY ABLATION OF LIVER  LESION  4/6/2021    Procedure: RADIOFREQUENCY ABLATION, LESION, LIVER Robotic of segment 8 ;  Surgeon: Luciano Castaneda MD;  [facility] 2ND FLR;  Service: General;;    REVISION OF COLOSTOMY WITH REPAIR OF PARACOLOSTOMY HERNIA N/A 8/16/2021    Procedure: REVISION, COLOSTOMY, WITH PARACOLOSTOMY HERNIA REPAIR;  Surgeon: RAHUL Jacobson MD;  [facility] 2ND FLR;  Service: Colon and Rectal;  Laterality: N/A;    ROBOT-ASSISTED LAPAROSCOPIC PARTIAL SURGICAL REMOVAL OF LIVER USING DA MARBELLA XI N/A 4/6/2021    Procedure: XI ROBOTIC RESECTION, LIVER - RIGHT POSTERIOR - NEED DROP IN BK PROBE;  Surgeon: Luciano Castaneda MD;  [facility] 2ND FLR;  Service: General;  Laterality: N/A;    TOTAL ABDOMINAL HYSTERECTOMY W/ BILATERAL SALPINGOOPHORECTOMY N/A 8/16/2021    Procedure: HYSTERECTOMY, TOTAL, ABDOMINAL, WITH BILATERAL SALPINGO-OOPHORECTOMY;  Surgeon: Alejo Mccullough MD;  Location: [facility] 2ND FLR;  Service: OB/GYN;  Laterality: N/A;    VAGINA RECONSTRUCTION SURGERY N/A 8/16/2021    Procedure: RECONSTRUCTION, VAGINA;  Surgeon: Nicholas Saez MD;  Location: [facility] OR 2ND FLR;  Service: Plastics;  Laterality: N/A;    VAGINECTOMY N/A 8/16/2021    Procedure: VAGINECTOMY;  Surgeon: RAHUL Jacobson MD;  Location: [facility] OR 2ND FLR;  Service: Colon and Rectal;  Laterality: N/A;  Posterior       Review of patient's allergies indicates:  No Known Allergies    No current facility-administered medications on file prior to encounter.     Current Outpatient Medications on File Prior to Encounter   Medication Sig Dispense Refill    amLODIPine (NORVASC) 5 MG tablet Take 5 mg by mouth once daily.      diphenhydrAMINE-acetaminophen (TYLENOL PM)  mg Tab Take 1 tablet by mouth nightly as needed.      escitalopram oxalate (LEXAPRO) 10 MG tablet Take 10 mg by mouth every evening.       gabapentin (NEURONTIN) 100 MG capsule Take 100 mg by mouth 2 (two) times daily.       levothyroxine (SYNTHROID) 25 MCG  tablet Take 25 mcg by mouth before breakfast.      LIDOcaine-prilocaine (EMLA) cream SMARTSIG:Sparingly Topical      metoprolol succinate (TOPROL-XL) 100 MG 24 hr tablet Take 100 mg by mouth nightly.       telmisartan (MICARDIS) 80 MG Tab Take 80 mg by mouth once daily.      acetaminophen (TYLENOL) 325 MG tablet Take 325 mg by mouth every 6 (six) hours as needed for Pain.      metFORMIN (GLUCOPHAGE) 500 MG tablet Take 500 mg by mouth 2 (two) times daily with meals.      oxyCODONE (ROXICODONE) 5 MG immediate release tablet Take 1 tablet (5 mg total) by mouth every 4 (four) hours as needed for Pain. 30 tablet 0       Family History   Problem Relation Age of Onset    Breast cancer Mother 44    Cancer Mother     Heart disease Father     Hyperlipidemia Father     Prostate cancer Father     Breast cancer Maternal Grandmother     Cancer Paternal Grandmother         ? female origin    Ovarian cancer Neg Hx     Uterine cancer Neg Hx     Colon cancer Neg Hx        Social History     Socioeconomic History    Marital status: Single   Tobacco Use    Smoking status: Never    Smokeless tobacco: Never   Substance and Sexual Activity    Alcohol use: Never    Drug use: Never    Sexual activity: Not Currently       Review of Systems -    Respiratory : no cough, shortness of breath, or wheezing  Cardiovascular  no chest pain or dyspnea on exertion  Gastrointestinal no abdominal pain, change in bowel habits, or black or bloody stool  Musculoskeletal no deformities, swelling  Neurological no TIA or stroke symptoms    Physical Exam:  General: NAD  AT NC EOMI  Mallampati Score   Neck supple, trachea midline  Lungs: nl excursions, no retractions.  Breathing comfortably  Abdomen ND soft NT.  Colostomy in place, pink and well perfused.  Extremities: No CCE.      ASA:  II    PLAN  COLONOSCOPY.  The details of the procedure, the possible need for biopsy or polypectomy and the potential risks including bleeding, perforation, missed polyps were  discussed in detail.    Ervin Rossi MD  Colon and Rectal Surgery Fellow

## 2022-09-09 NOTE — TRANSFER OF CARE
"Anesthesia Transfer of Care Note    Patient: Meron Lamar    Procedure(s) Performed: Procedure(s) (LRB):  COLONOSCOPY through stoma (N/A)    Patient location: PACU    Anesthesia Type: general    Transport from OR: Transported from OR on room air with adequate spontaneous ventilation    Post pain: adequate analgesia    Post assessment: no apparent anesthetic complications    Post vital signs: stable    Level of consciousness: awake    Nausea/Vomiting: no nausea/vomiting    Complications: none    Transfer of care protocol was followed      Last vitals:   Visit Vitals  BP (!) 106/55 (BP Location: Left arm, Patient Position: Lying)   Pulse 85   Temp 36.4 °C (97.6 °F) (Temporal)   Resp 15   Ht 5' 5" (1.651 m)   Wt 96.6 kg (213 lb)   SpO2 96%   Breastfeeding No   BMI 35.45 kg/m²     "

## 2022-09-14 ENCOUNTER — OFFICE VISIT (OUTPATIENT)
Dept: GYNECOLOGIC ONCOLOGY | Facility: CLINIC | Age: 57
End: 2022-09-14
Payer: COMMERCIAL

## 2022-09-14 VITALS
WEIGHT: 215.38 LBS | SYSTOLIC BLOOD PRESSURE: 133 MMHG | BODY MASS INDEX: 35.88 KG/M2 | RESPIRATION RATE: 16 BRPM | HEART RATE: 70 BPM | DIASTOLIC BLOOD PRESSURE: 64 MMHG | HEIGHT: 65 IN

## 2022-09-14 DIAGNOSIS — C20 RECTAL CANCER: Primary | ICD-10-CM

## 2022-09-14 PROCEDURE — 3078F PR MOST RECENT DIASTOLIC BLOOD PRESSURE < 80 MM HG: ICD-10-PCS | Mod: CPTII,S$GLB,, | Performed by: OBSTETRICS & GYNECOLOGY

## 2022-09-14 PROCEDURE — 3078F DIAST BP <80 MM HG: CPT | Mod: CPTII,S$GLB,, | Performed by: OBSTETRICS & GYNECOLOGY

## 2022-09-14 PROCEDURE — 1159F MED LIST DOCD IN RCRD: CPT | Mod: CPTII,S$GLB,, | Performed by: OBSTETRICS & GYNECOLOGY

## 2022-09-14 PROCEDURE — 3075F SYST BP GE 130 - 139MM HG: CPT | Mod: CPTII,S$GLB,, | Performed by: OBSTETRICS & GYNECOLOGY

## 2022-09-14 PROCEDURE — 99213 OFFICE O/P EST LOW 20 MIN: CPT | Mod: S$GLB,,, | Performed by: OBSTETRICS & GYNECOLOGY

## 2022-09-14 PROCEDURE — 99999 PR PBB SHADOW E&M-EST. PATIENT-LVL III: CPT | Mod: PBBFAC,,, | Performed by: OBSTETRICS & GYNECOLOGY

## 2022-09-14 PROCEDURE — 3075F PR MOST RECENT SYSTOLIC BLOOD PRESS GE 130-139MM HG: ICD-10-PCS | Mod: CPTII,S$GLB,, | Performed by: OBSTETRICS & GYNECOLOGY

## 2022-09-14 PROCEDURE — 3008F BODY MASS INDEX DOCD: CPT | Mod: CPTII,S$GLB,, | Performed by: OBSTETRICS & GYNECOLOGY

## 2022-09-14 PROCEDURE — 1159F PR MEDICATION LIST DOCUMENTED IN MEDICAL RECORD: ICD-10-PCS | Mod: CPTII,S$GLB,, | Performed by: OBSTETRICS & GYNECOLOGY

## 2022-09-14 PROCEDURE — 3008F PR BODY MASS INDEX (BMI) DOCUMENTED: ICD-10-PCS | Mod: CPTII,S$GLB,, | Performed by: OBSTETRICS & GYNECOLOGY

## 2022-09-14 PROCEDURE — 99999 PR PBB SHADOW E&M-EST. PATIENT-LVL III: ICD-10-PCS | Mod: PBBFAC,,, | Performed by: OBSTETRICS & GYNECOLOGY

## 2022-09-14 PROCEDURE — 99213 PR OFFICE/OUTPT VISIT, EST, LEVL III, 20-29 MIN: ICD-10-PCS | Mod: S$GLB,,, | Performed by: OBSTETRICS & GYNECOLOGY

## 2022-09-14 PROCEDURE — 4010F ACE/ARB THERAPY RXD/TAKEN: CPT | Mod: CPTII,S$GLB,, | Performed by: OBSTETRICS & GYNECOLOGY

## 2022-09-14 PROCEDURE — 4010F PR ACE/ARB THEARPY RXD/TAKEN: ICD-10-PCS | Mod: CPTII,S$GLB,, | Performed by: OBSTETRICS & GYNECOLOGY

## 2022-09-14 RX ORDER — ACYCLOVIR 400 MG/1
400 TABLET ORAL 2 TIMES DAILY
COMMUNITY
Start: 2022-05-16

## 2022-09-14 NOTE — PROGRESS NOTES
REFERRING PROVIDER  Mele Jacobson MD     INTERVAL HISTORY  CC: Postop Follow-up  Meron Lamar is a 57 y.o. with locally advanced rectal cancer s/p FATOU/BSO on 8/16/2021 with me, concurrent with APR / Posterior vaginectomy with Colorectal and VRAM vaginal reconstruction with [place] fasciocutaneous flap by Plastic Surgery.  She has done very well since surgery without significant complication.  She had had good reports in 1 year follow-up visit with colorectal and plastics    ONCOLOGIC HISTORY   See detailed history from Dr. Jacobson's 10/12/2021 note    8/16/2021 GYN Procedures / Findings / Path: FATOU / BSO  FINDINGS: See Dr. Jacobson's note for additional findings.  Normal external female genitalia.  2-3 cm rectovaginal fistula with posterolateral vaginal thickening and induration.  Cervix normal appearing.  Uterus small and slightly irregular with small fibroids.  The tubes, and ovaries were grossly normal  PATHOLOGY:   Ectocervix:  No histopathologic abnormalities,  negative  for dysplasia   or malignancy.  Endocervix:  Benign nabothian cysts and scattered tunnel clusters,   diffuse tubal metaplasia  negative  for dysplasia or malignancy.  Endometrium:  Inactive endometrium,  negative  for atypia or malignancy. Myometrium:  Multiople leiomyomata (2.5 cm in greatest extent), adenomyosis,  negative  for atypia malignancy.  Serosa:   Positive  for colorectal adenocarcinoma with signet ring cell features.  Fallopian tubes:  Left fallopian tube:  Cystic Walthard rest, paratubal cyst,  negative for carcinoma.  Right fallopian tube:  No histopathologic abnormalities,  negative  for carcinoma.  Left ovary:  Cystic follicles and multiple corpora albicantia, negative  for carcinoma.  Right ovary:  Cystic follicle,  multiple corpora albicantia,  negative for carcinoma.     OBJECTIVE   Vitals:    09/14/22 1058   BP: 133/64   Pulse: 70   Resp: 16      Body mass index is 35.84 kg/m².     Physical Exam:    Constitutional: She is oriented to person, place, and time. She appears well-developed and well-nourished. No distress.    HENT:   Head: Normocephalic and atraumatic.    Eyes: Conjunctivae and EOM are normal.      Pulmonary/Chest: Effort normal. No respiratory distress.        Abdominal: Soft. She exhibits no distension and no mass. There is no abdominal tenderness. There is no rebound and no guarding. No hernia.     Genitourinary:    Genitourinary Comments: Vaginal reconstruction intact and healthy appearing                 Neurological: She is alert and oriented to person, place, and time.    Skin: No rash noted.    Psychiatric: She has a normal mood and affect. Her behavior is normal.   ECOG status: 1    LABORATORY DATA  Lab data reviewed.    RADIOLOGICAL DATA  Radiology data reviewed.    PATHOLOGY DATA  Pathology data reviewed.    ASSESSMENT    1. Rectal cancer     Ms. Lamar is doing well s/p above procedures 1 year ago.  Vulvar exam without significant pathology.  She is welcome to follow-up again in 1 year     PLAN  Patient is welcome to follow up in 1 year for vulvar exam at her discretion           Alejo Mccullough MD

## 2023-09-14 ENCOUNTER — OFFICE VISIT (OUTPATIENT)
Dept: GYNECOLOGIC ONCOLOGY | Facility: CLINIC | Age: 58
End: 2023-09-14
Payer: COMMERCIAL

## 2023-09-14 VITALS
WEIGHT: 222 LBS | DIASTOLIC BLOOD PRESSURE: 70 MMHG | BODY MASS INDEX: 36.94 KG/M2 | SYSTOLIC BLOOD PRESSURE: 147 MMHG | HEART RATE: 76 BPM

## 2023-09-14 DIAGNOSIS — C20 RECTAL CANCER: Primary | ICD-10-CM

## 2023-09-14 PROCEDURE — 4010F ACE/ARB THERAPY RXD/TAKEN: CPT | Mod: CPTII,S$GLB,, | Performed by: OBSTETRICS & GYNECOLOGY

## 2023-09-14 PROCEDURE — 3008F BODY MASS INDEX DOCD: CPT | Mod: CPTII,S$GLB,, | Performed by: OBSTETRICS & GYNECOLOGY

## 2023-09-14 PROCEDURE — 1159F PR MEDICATION LIST DOCUMENTED IN MEDICAL RECORD: ICD-10-PCS | Mod: CPTII,S$GLB,, | Performed by: OBSTETRICS & GYNECOLOGY

## 2023-09-14 PROCEDURE — 99999 PR PBB SHADOW E&M-EST. PATIENT-LVL III: CPT | Mod: PBBFAC,,, | Performed by: OBSTETRICS & GYNECOLOGY

## 2023-09-14 PROCEDURE — 3077F PR MOST RECENT SYSTOLIC BLOOD PRESSURE >= 140 MM HG: ICD-10-PCS | Mod: CPTII,S$GLB,, | Performed by: OBSTETRICS & GYNECOLOGY

## 2023-09-14 PROCEDURE — 99213 OFFICE O/P EST LOW 20 MIN: CPT | Mod: S$GLB,,, | Performed by: OBSTETRICS & GYNECOLOGY

## 2023-09-14 PROCEDURE — 3078F PR MOST RECENT DIASTOLIC BLOOD PRESSURE < 80 MM HG: ICD-10-PCS | Mod: CPTII,S$GLB,, | Performed by: OBSTETRICS & GYNECOLOGY

## 2023-09-14 PROCEDURE — 3077F SYST BP >= 140 MM HG: CPT | Mod: CPTII,S$GLB,, | Performed by: OBSTETRICS & GYNECOLOGY

## 2023-09-14 PROCEDURE — 1159F MED LIST DOCD IN RCRD: CPT | Mod: CPTII,S$GLB,, | Performed by: OBSTETRICS & GYNECOLOGY

## 2023-09-14 PROCEDURE — 3078F DIAST BP <80 MM HG: CPT | Mod: CPTII,S$GLB,, | Performed by: OBSTETRICS & GYNECOLOGY

## 2023-09-14 PROCEDURE — 4010F PR ACE/ARB THEARPY RXD/TAKEN: ICD-10-PCS | Mod: CPTII,S$GLB,, | Performed by: OBSTETRICS & GYNECOLOGY

## 2023-09-14 PROCEDURE — 99213 PR OFFICE/OUTPT VISIT, EST, LEVL III, 20-29 MIN: ICD-10-PCS | Mod: S$GLB,,, | Performed by: OBSTETRICS & GYNECOLOGY

## 2023-09-14 PROCEDURE — 99999 PR PBB SHADOW E&M-EST. PATIENT-LVL III: ICD-10-PCS | Mod: PBBFAC,,, | Performed by: OBSTETRICS & GYNECOLOGY

## 2023-09-14 PROCEDURE — 3008F PR BODY MASS INDEX (BMI) DOCUMENTED: ICD-10-PCS | Mod: CPTII,S$GLB,, | Performed by: OBSTETRICS & GYNECOLOGY

## 2023-09-14 NOTE — PROGRESS NOTES
REFERRING PROVIDER  Mele Jacobson MD    Oncologist:  Cayetano Stearns MD     INTERVAL HISTORY  CC: Postop Follow-up  Meron Lamar is a 58 y.o. with locally advanced rectal cancer s/p FATOU/BSO on 8/16/2021 with me, concurrent with APR / Posterior vaginectomy with Colorectal and VRAM vaginal reconstruction with [place] fasciocutaneous flap by Plastic Surgery.      She remains on 5-FU and Avastin.  She has a new left lung nodule for which SBRT is planned.  She has a parastomal hernia that has been slowly growing which causes some discomfort and occasional pouching difficulties.     ONCOLOGIC HISTORY   See detailed history from Dr. Jacobson's 10/12/2021 note     8/16/2021 GYN Procedures / Findings / Path: FATOU / BSO  FINDINGS: See Dr. Jacobson's note for additional findings.  Normal external female genitalia.  2-3 cm rectovaginal fistula with posterolateral vaginal thickening and induration.  Cervix normal appearing.  Uterus small and slightly irregular with small fibroids.  The tubes, and ovaries were grossly normal  PATHOLOGY:   Ectocervix:  No histopathologic abnormalities,  negative  for dysplasia   or malignancy.  Endocervix:  Benign nabothian cysts and scattered tunnel clusters,   diffuse tubal metaplasia  negative  for dysplasia or malignancy.  Endometrium:  Inactive endometrium,  negative  for atypia or malignancy. Myometrium:  Multiople leiomyomata (2.5 cm in greatest extent), adenomyosis,  negative  for atypia malignancy.  Serosa:   Positive  for colorectal adenocarcinoma with signet ring cell features.  Fallopian tubes:  Left fallopian tube:  Cystic Walthard rest, paratubal cyst,  negative for carcinoma.  Right fallopian tube:  No histopathologic abnormalities,  negative  for carcinoma.  Left ovary:  Cystic follicles and multiple corpora albicantia, negative  for carcinoma.  Right ovary:  Cystic follicle,  multiple corpora albicantia,  negative for carcinoma.      OBJECTIVE   Vitals:    09/14/23 1003    BP: (!) 147/70   Pulse: 76      Body mass index is 36.94 kg/m².     Physical Exam:   Constitutional: She is oriented to person, place, and time. She appears well-developed and well-nourished. No distress.    HENT:   Head: Normocephalic and atraumatic.    Eyes: Conjunctivae and EOM are normal.      Pulmonary/Chest: Effort normal. No respiratory distress.        Abdominal: Soft. She exhibits no distension and no mass. There is no abdominal tenderness. There is no rebound and no guarding. A hernia (parastomal) is present.   Ostomy healthy     Genitourinary:    Genitourinary Comments: Vulva: normal  Vagina: Reconstruction intact, well-healed                 Neurological: She is alert and oriented to person, place, and time.    Skin: No rash noted.    Psychiatric: She has a normal mood and affect. Her behavior is normal.     ECOG status: 0    LABORATORY DATA  Lab data reviewed.    RADIOLOGICAL DATA  Radiology data reviewed.    PATHOLOGY DATA  Pathology data reviewed.    ASSESSMENT    1. Rectal cancer     Ms. Lamar is doing well s/p above procedures 2 year ago.  Vulvar / neovagian exam without significant pathology.  She is welcome to follow-up again in 1 year     PLAN  Patient is welcome to follow up in 1 year for vulvar exam at her discretion          Alejo Mccullough MD

## 2023-10-10 ENCOUNTER — OFFICE VISIT (OUTPATIENT)
Dept: SURGERY | Facility: CLINIC | Age: 58
End: 2023-10-10
Payer: COMMERCIAL

## 2023-10-10 ENCOUNTER — OFFICE VISIT (OUTPATIENT)
Dept: WOUND CARE | Facility: CLINIC | Age: 58
End: 2023-10-10
Payer: COMMERCIAL

## 2023-10-10 VITALS
HEIGHT: 65 IN | RESPIRATION RATE: 19 BRPM | SYSTOLIC BLOOD PRESSURE: 134 MMHG | OXYGEN SATURATION: 98 % | DIASTOLIC BLOOD PRESSURE: 71 MMHG | HEART RATE: 76 BPM | BODY MASS INDEX: 36.36 KG/M2 | WEIGHT: 218.25 LBS

## 2023-10-10 DIAGNOSIS — Z43.3 ATTENTION TO COLOSTOMY: Primary | ICD-10-CM

## 2023-10-10 DIAGNOSIS — K43.5 PARASTOMAL HERNIA WITHOUT OBSTRUCTION OR GANGRENE: ICD-10-CM

## 2023-10-10 DIAGNOSIS — Z85.048 HISTORY OF RECTAL CANCER: Primary | ICD-10-CM

## 2023-10-10 DIAGNOSIS — R05.3 CHRONIC COUGH: ICD-10-CM

## 2023-10-10 PROCEDURE — 99999 PR PBB SHADOW E&M-EST. PATIENT-LVL III: CPT | Mod: PBBFAC,,, | Performed by: COLON & RECTAL SURGERY

## 2023-10-10 PROCEDURE — 99999 PR PBB SHADOW E&M-EST. PATIENT-LVL III: ICD-10-PCS | Mod: PBBFAC,,, | Performed by: COLON & RECTAL SURGERY

## 2023-10-10 PROCEDURE — 1159F PR MEDICATION LIST DOCUMENTED IN MEDICAL RECORD: ICD-10-PCS | Mod: CPTII,S$GLB,, | Performed by: CLINICAL NURSE SPECIALIST

## 2023-10-10 PROCEDURE — 4010F ACE/ARB THERAPY RXD/TAKEN: CPT | Mod: CPTII,S$GLB,, | Performed by: COLON & RECTAL SURGERY

## 2023-10-10 PROCEDURE — 4010F PR ACE/ARB THEARPY RXD/TAKEN: ICD-10-PCS | Mod: CPTII,S$GLB,, | Performed by: CLINICAL NURSE SPECIALIST

## 2023-10-10 PROCEDURE — 1160F PR REVIEW ALL MEDS BY PRESCRIBER/CLIN PHARMACIST DOCUMENTED: ICD-10-PCS | Mod: CPTII,S$GLB,, | Performed by: CLINICAL NURSE SPECIALIST

## 2023-10-10 PROCEDURE — 3008F BODY MASS INDEX DOCD: CPT | Mod: CPTII,S$GLB,, | Performed by: COLON & RECTAL SURGERY

## 2023-10-10 PROCEDURE — 1159F MED LIST DOCD IN RCRD: CPT | Mod: CPTII,S$GLB,, | Performed by: CLINICAL NURSE SPECIALIST

## 2023-10-10 PROCEDURE — 99211 OFF/OP EST MAY X REQ PHY/QHP: CPT | Mod: S$GLB,,, | Performed by: CLINICAL NURSE SPECIALIST

## 2023-10-10 PROCEDURE — 1159F MED LIST DOCD IN RCRD: CPT | Mod: CPTII,S$GLB,, | Performed by: COLON & RECTAL SURGERY

## 2023-10-10 PROCEDURE — 3075F PR MOST RECENT SYSTOLIC BLOOD PRESS GE 130-139MM HG: ICD-10-PCS | Mod: CPTII,S$GLB,, | Performed by: COLON & RECTAL SURGERY

## 2023-10-10 PROCEDURE — 3075F SYST BP GE 130 - 139MM HG: CPT | Mod: CPTII,S$GLB,, | Performed by: COLON & RECTAL SURGERY

## 2023-10-10 PROCEDURE — 4010F PR ACE/ARB THEARPY RXD/TAKEN: ICD-10-PCS | Mod: CPTII,S$GLB,, | Performed by: COLON & RECTAL SURGERY

## 2023-10-10 PROCEDURE — 3078F PR MOST RECENT DIASTOLIC BLOOD PRESSURE < 80 MM HG: ICD-10-PCS | Mod: CPTII,S$GLB,, | Performed by: COLON & RECTAL SURGERY

## 2023-10-10 PROCEDURE — 4010F ACE/ARB THERAPY RXD/TAKEN: CPT | Mod: CPTII,S$GLB,, | Performed by: CLINICAL NURSE SPECIALIST

## 2023-10-10 PROCEDURE — 1159F PR MEDICATION LIST DOCUMENTED IN MEDICAL RECORD: ICD-10-PCS | Mod: CPTII,S$GLB,, | Performed by: COLON & RECTAL SURGERY

## 2023-10-10 PROCEDURE — 99211 PR OFFICE/OUTPT VISIT, EST, LEVL I: ICD-10-PCS | Mod: S$GLB,,, | Performed by: CLINICAL NURSE SPECIALIST

## 2023-10-10 PROCEDURE — 99999 PR PBB SHADOW E&M-EST. PATIENT-LVL III: CPT | Mod: PBBFAC,,, | Performed by: CLINICAL NURSE SPECIALIST

## 2023-10-10 PROCEDURE — 1160F RVW MEDS BY RX/DR IN RCRD: CPT | Mod: CPTII,S$GLB,, | Performed by: COLON & RECTAL SURGERY

## 2023-10-10 PROCEDURE — 1160F RVW MEDS BY RX/DR IN RCRD: CPT | Mod: CPTII,S$GLB,, | Performed by: CLINICAL NURSE SPECIALIST

## 2023-10-10 PROCEDURE — 1160F PR REVIEW ALL MEDS BY PRESCRIBER/CLIN PHARMACIST DOCUMENTED: ICD-10-PCS | Mod: CPTII,S$GLB,, | Performed by: COLON & RECTAL SURGERY

## 2023-10-10 PROCEDURE — 3008F PR BODY MASS INDEX (BMI) DOCUMENTED: ICD-10-PCS | Mod: CPTII,S$GLB,, | Performed by: COLON & RECTAL SURGERY

## 2023-10-10 PROCEDURE — 99214 OFFICE O/P EST MOD 30 MIN: CPT | Mod: S$GLB,,, | Performed by: COLON & RECTAL SURGERY

## 2023-10-10 PROCEDURE — 99214 PR OFFICE/OUTPT VISIT, EST, LEVL IV, 30-39 MIN: ICD-10-PCS | Mod: S$GLB,,, | Performed by: COLON & RECTAL SURGERY

## 2023-10-10 PROCEDURE — 3078F DIAST BP <80 MM HG: CPT | Mod: CPTII,S$GLB,, | Performed by: COLON & RECTAL SURGERY

## 2023-10-10 PROCEDURE — 99999 PR PBB SHADOW E&M-EST. PATIENT-LVL III: ICD-10-PCS | Mod: PBBFAC,,, | Performed by: CLINICAL NURSE SPECIALIST

## 2023-10-10 RX ORDER — ACETAMINOPHEN 500 MG
TABLET ORAL
COMMUNITY
Start: 2023-04-25

## 2023-10-10 NOTE — PROGRESS NOTES
"CRS Office Visit Followup    Referring Md:   No referring provider defined for this encounter.    SUBJECTIVE:     Chief Complaint: rectal cancer    History of Present Illness:  The patient is new patient to this practice.   Course is as follows:  Patient is a 58 y.o. female presents with locally advanced rectal cancer.     11/27/19:   8/28/20:  Colonoscopy revealed a single sessile 1.4 cm nonbleeding polyp in the mid rectum at 8 cm from the anus. There was an infiltrative, ulcerated and fungating mass at the distal rectum, measuring 3 cm, causing partial obstruction. This was her first colonoscopy  Pathology: Polyp showed tubular adenoma with focal HG dysplasia. Distal rectal mass biopsy c/w adenocarcinoma with ulceration"   - MMR normal/proficient.     Staging:  - CEA is 31.3  - 9/15/2020 CT C/A/P  4mm R lung nodule and additional region of somewhat nodular atelectasis in the LLL up to 5mm.  2 cm ill-defined region in posterior R lobe of liver present on prior CT imaging 2017. 3.5 cm regional fatty sparing in posteroinferior most aspect of the R lobe of liver. No significant lymph node enlargement in upper abdomen. Few stable lymph nodes in central mesentery unchanged from 2017.  Impression, no CT evidence for metastatic disease.   - 9/29/2020 MRI pelvis  Low stenotic, partially circumferential rectal carcinoma measuring 3.6 cm in length with max thickness 1.7 cm. Distal portion of the tumor from the anal verge measured approx 3.2 cm with partial involvement of the muscularis propria. No evidence of fat plane separation within the posterior wall of the vagina. Largest R presacral node measuring approx 1 x 0.7 cm, another 7 x 5 mm, another 8 x 5 mm.  Uterus 6 x 2.9 x 5.6 cm with 2.1 cm fibroid.  In the posterior wall of the vagina is a mass measuring approx 4.8 x 2.2 x 3.2 cm without connection with the rectal mass. Differential includes vaginal leiomyoma but cannot rule out vaginal carcinoma or LMS.    10/12/20:  " EUA with biopsies --> Locally invasive rectal cancer.  Digital exam demonstrated tumor in the posterior vaginal wall.  Anteriorly was clear.  Lateral sidewalls felt clear on the anterior aspect of the lateral sidewall.  Digital exam of the anus demonstrated large near circumferential rectal mass mostly located anteriorly and extending onto the patient's left side.  Felt tethered to the left levators.  Mass was continuous with the posterior vaginal mass.  Mass was ulcerated in the central aspect anteriorly.  Pat on 10/14/2020 demonstrated hypermetabolic mass in the right lobe of the liver not well visualized on CT scan but appears to measure 3.5 cm.  Additional more inferior mass in right side of the liver also consistent with metastatic disease.  11/06/2020:  Liver biopsy consistent with metastatic colorectal adenocarcinoma.    Plan was for PATRICK.  She completed short course radiotherapy from 10/26/2020 to 10/30/2020.  FOLFOX was started on 11/17/2020.      Medical comorbidities include DM, HTN, hyperthyroidism and sleep apnea (CPAP). BMI 34     No prior abdominal surgeries.  She has had a prior anal fistula versus fissure surgery 20+ years ago.  At that time, she had a flexible sigmoidoscopy that was normal.    Functionally, she is active and performs all of her activities of daily living.  Nonsmoker.  No significant weight loss.  Historically, she had 3 bowel movements per day.  No fecal incontinence.     Family history for father - prostate cancer (dec). Mother with breast cancer (44, dec). MGM and PGM also with unknown cancer.    Tolerated short course XRT from 10/26/20 to 10/20/20.  Neoadjuvant FOLFOX started 11/17/2020. She got 3 cycles and stopped after 12/15/21.  Stopped for vaginal drainage with RV fistula.     01/13/2021:  Underwent laparoscopic loop sigmoid colostomy secondary to rectovaginal fistula    Transitioned for FOLFOXIRI on 2/3/21. She got 3 cycles.      4/6/2021: (Luciano Castaneda)  Robotic assisted  segment 6/7 liver resection (right posterior hepatectomy)  Robotic assisted microwave ablation segment 8 lesion   Pathology:   Multifocal (2 foci) metastatic adenocarcinoma consistent with colonic origin,   2.6 cm and 1.3cm   Resection margin is POSITIVE for one of the lesion     8/16/2021:   1.  Abdominoperineal resection     2.  Posterior vaginectomy  3.  Parastomal hernia repair with revision of colostomy  4.  Unilateral external oblique release with myofascial advancement to allow for abdominal closure  5.  Total abdominal hysterectomy and bilateral salpingo oophorectomy performed by gyn Oncology  6.  Pedicled lap reconstruction for the pelvis performed Plastic surgery  Pathology:  1. Uterus and cervix, bilateral fallopian tubes and bilateral ovaries; total   hysterectomy , bilateral salpingectomies and bilateral oophorectomies   (54.5g):   Cervix:            Serosa:   Positive  for colorectal adenocarcinoma with signet ring cell features   Colorectal adenocarcinoma (6.0 cm):    Macroscopic evaluation of mesorectum:   Complete    Tumor site:  Rectum:   Entirely below anterior peritoneal reflection    Histologic type:   Adenocarcinoma with mucinous (10%) and signet ring cell (5%) features    Histologic type:  Moderately to poorly differentiated (G2-G3)    Tumor size:   6 cm    Tumor extent:   Directly invades or adherence to adjacent structures, vaginal wall and uterine serosa (T4b)   Macroscopic tumor perforation:   Present (rectovaginal fistula)    Lymphovascular invasion:  Present:   Small vessel    Perineural invasion:   Not identified    Treatment effect:   Present, with residual cancer showing evident tumor  regression, but more than single cells or rare small groups (partial  response, score 2)    Margins:     Left vaginal wall  margin-positive  for carcinoma     - Additional Left vaginal wall margin sent.  Tumor in part of the margin but not the entire margin.  Specimen with orientation.  Confirmed with  pathologist that tumor only on 1 edge.  Therefore, final margins negative.    Regional lymph node status:   All examined lymph nodes are  negative  for metastatic carcinoma (0/12)    Current status:  9/14/21:  Doing very well.  Intermittent drainage from her perineum.  Currently wearing depends changing than 2-3 times per day.  No fevers or chills.  Pain well controlled.  Ostomy functioning well.  Eating.  Ambulating without assistance.  One drain remains.  Low output.  10/12/21:  Continues to do well.  Active.  No issues with her ostomy.  Decreased vaginal drainage.  Activity is improving.  10/10/23:   Presents for evaluation for parastomal hernia.  She was recently diagnosed with a pulmonary nodule consistent with metastatic disease.  She underwent SBRT.  She remains on adjuvant chemotherapy and is followed by Dr. Cayetano Stearns.   She presents for intermittent pain and swelling around her ostomy as well as increasing difficulty with pouching.  She has gained 20 lb over the past several months.  Regarding the ostomy, she has increased leaks as well as intermittent pain with bending over or movement.    Surveillance:   9/9/2022:  Surveillance colonoscopy performed.  - Impression:            - Widely patent end colostomy with healthy                          appearing mucosa in the descending colon.                          - The examined portion of the ileum was normal.                          - The descending colon, transverse colon and                          ascending colon are normal.                          - No specimens collected.     Review of Systems:  Review of Systems   Constitutional:  Negative for chills, diaphoresis, fever, malaise/fatigue and weight loss.   HENT:  Negative for congestion.    Respiratory:  Negative for shortness of breath.    Cardiovascular:  Negative for chest pain and leg swelling.   Gastrointestinal:  Negative for abdominal pain, blood in stool, constipation, diarrhea, nausea and  "vomiting.   Genitourinary:  Negative for dysuria.   Musculoskeletal:  Negative for back pain and myalgias.   Skin:  Negative for rash.   Neurological:  Negative for dizziness and weakness.   Endo/Heme/Allergies:  Does not bruise/bleed easily.   Psychiatric/Behavioral:  Negative for depression.        OBJECTIVE:     Vital Signs (Most Recent)  /71 (BP Location: Left arm, Patient Position: Sitting)   Pulse 76   Resp 19   Ht 5' 5" (1.651 m)   Wt 99 kg (218 lb 4.1 oz)   SpO2 98%   BMI 36.32 kg/m²     Physical Exam:  General: White female in no distress   Neuro: alert and oriented x 4.  Moves all extremities.     HEENT: no icterus.  Trachea midline  Respiratory: respirations are even and unlabored  Cardiac: tachycardic  Abdomen:  Midline incision well healed.  Ostomy in the left mid abdomen is healthy and pink and protrudes above the level of the skin.      Surrounding peristomal hernia is firm and unable to be completely reduced.  Extremities: Warm dry and intact  Skin: no rashes  Anorectal:  Perineal flap incision is healing very well.  Small amount of granulation tissue treated with silver nitrate.  Posterior vaginal wall intact and healing well.    Labs:   H&H 12 and 39.  Alb 4.1.  Normal renal function  CEA down to 4.2 from 30+    Imaging:   MRI 1/8/2021: Locally invasive low rectal carcinoma with anal sphincter and vaginal involvement with moderate response to treatment.  There is similar tumor extent to the 09/29/2020 exam, but significantly decreased in size and improved signal characteristics, as above.  Development of colovaginal fistula noted.  Two suspicious perirectal lymph nodes, significantly improved.  CT C/A/P 01/08/2021:: multiple hypodense lesions in the right hepatic lobe, some of which appear new since the prior outside CT from 09/15/2020 and are potentially concerning for intrahepatic metastasis         ASSESSMENT/PLAN:     Diagnoses and all orders for this visit:    History of rectal " cancer    Parastomal hernia without obstruction or gangrene          58 y.o. woman with locally advanced rectal cancer with multiple hepatic metastases.  She has received short course radiotherapy followed by FOLFOX chemotherapy.  As response to her treatment, her tumor has decreased in size and she has now developed a rectovaginal fistula.    With the RV fistula, she has stopped eating and is unable to tolerate chemotherapy.   Therefore, urgent fecal diversion was offered with a laparoscopic diverting loop sigmoid colostomy.  This was performed on 01/13/2021.  She then had 3 additional cycles of chemotherapy.    Liver resection was done on 4/6/21.  Positive margins.     Abdominal perineal resection with EN bloc posterior vaginectomy and pedicle flap reconstruction performed on 08/16/2021 for yp T4b N0 M1c rectal cancer.       She recently developed pulmonary metastasis treated by SBRT.  She continues on adjuvant chemotherapy.  Regarding the peristomal hernia, options were discussed with her.  I have recommended for her to meet with the enterostomal therapist to see if they are different options for pouching that could decrease her leaks.  Abdominal binders were shown to her today and recommended to help with discomfort from the protrusion of the hernia.  Additionally, weight loss was recommended.  If she continues to have significant pain and leakage interfering with her quality of life despite conservative measures, discussed that we could then proceed with repair.  I have asked to review her CT images to look at the neck of the hernia for size to see if the hernia can be repaired with a mesh underlay sugar Moon or relocation would be necessary.  Discussed that this would unlikely be able to be performed laparoscopically due to her extensive past surgical history and would require repeat midline laparotomy.      I will follow up with her once I receive her images.      RAHUL Jacobson MD, FACS, FASCRS  Staff  Surgeon  Colon & Rectal Surgery      CC: Cayetano Stearns MD

## 2023-10-10 NOTE — PROGRESS NOTES
"Subjective     Patient ID: Meron Lamar is a 58 y.o. female.    Chief Complaint: Colostomy and Hernia    This is a pt known to me and sent for a same day visit while here to address her parastomal hernia. She had APR in 2021. She has gained some wt and has a moderate left sided hernia behind her colostomy, it does bother her more at some times than others. But she had bronchitis after covid and her cough she believe led to this . She comes with her spouse.       Review of Systems   Constitutional: Negative.    Gastrointestinal:  Negative for constipation and diarrhea.        Left sided colostomy   Genitourinary:  Negative for difficulty urinating.   Musculoskeletal: Negative.    Integumentary:  Negative for rash.          Objective     Physical Exam  Constitutional:       Appearance: Normal appearance. She is obese.   Pulmonary:      Effort: Pulmonary effort is normal. No respiratory distress.   Abdominal:      Tenderness: There is no abdominal tenderness.      Hernia: A hernia is present.       Note the bulge on left side,    She has experienced more freq leaks and needs to try alternate pouch system     She is wearing flat ERIC cut to fit with ring, but cutting too small only about 35 mm and her stoma is 45 mm so a bigger cut will help   I showed her the FLIP pouch for outward stomas this should fit better and I applied same,   Will ask for samples of   41210  [id number]  [id number]   Measured for [employer] hernia binder and she is a LARGE< 11" and hole on left, gave her brochure, to see if [employer] can supply this and if not she can call company [employer] directly        Assessment and Plan     1. Attention to colostomy    2. Parastomal hernia without obstruction or gangrene    3. Chronic cough        Refitted for FLIP pouch due to hernia  Fitted for hernia binder by [employer] and she will get this even if not covered by insurance  Fu as needed  I have reviewed the plan of care with the patient and/ or caregiver and " they express understanding. I spent over 50% of this 30 minute visit in face to face counseling.           No follow-ups on file.

## 2023-10-25 ENCOUNTER — TELEPHONE (OUTPATIENT)
Dept: SURGERY | Facility: CLINIC | Age: 58
End: 2023-10-25
Payer: COMMERCIAL

## 2023-10-25 ENCOUNTER — PATIENT MESSAGE (OUTPATIENT)
Dept: SURGERY | Facility: CLINIC | Age: 58
End: 2023-10-25
Payer: COMMERCIAL

## 2023-10-25 NOTE — TELEPHONE ENCOUNTER
Called patient to discuss her recent CT.  CT abdomen pelvis reviewed demonstrates a 4.3 cm fascial defect with herniated small bowel.  Discussed this ideally could be performed laparoscopically with a laparoscopic parastomal hernia repair with mesh underlay (berna Moon) procedure.  Discussed there is a risk of need to conversion to open surgery depending on the amount of adhesions.  Recommended trial of an abdominal binder 1st.  Website sent to her.  She will follow up with me if she wishes to proceed with hernia repair.  She will need to be off chemotherapy for 4 weeks prior and roughly 2-4 weeks postoperatively to allow for healing    RAHUL Jacobson MD, FACS, FASCRS  Staff Surgeon  Colon & Rectal Surgery

## 2023-12-20 ENCOUNTER — PATIENT MESSAGE (OUTPATIENT)
Dept: WOUND CARE | Facility: CLINIC | Age: 58
End: 2023-12-20
Payer: COMMERCIAL

## 2023-12-21 DIAGNOSIS — Z85.048 HISTORY OF RECTAL CANCER: Primary | ICD-10-CM

## 2023-12-21 DIAGNOSIS — Z93.3 COLOSTOMY IN PLACE: ICD-10-CM

## 2024-09-18 ENCOUNTER — OFFICE VISIT (OUTPATIENT)
Dept: GYNECOLOGIC ONCOLOGY | Facility: CLINIC | Age: 59
End: 2024-09-18
Payer: COMMERCIAL

## 2024-09-18 VITALS
SYSTOLIC BLOOD PRESSURE: 124 MMHG | BODY MASS INDEX: 39.78 KG/M2 | TEMPERATURE: 98 F | HEIGHT: 64 IN | WEIGHT: 233 LBS | HEART RATE: 66 BPM | OXYGEN SATURATION: 98 % | DIASTOLIC BLOOD PRESSURE: 58 MMHG

## 2024-09-18 DIAGNOSIS — C20 RECTAL CANCER: Primary | ICD-10-CM

## 2024-09-18 PROCEDURE — 3008F BODY MASS INDEX DOCD: CPT | Mod: CPTII,S$GLB,, | Performed by: OBSTETRICS & GYNECOLOGY

## 2024-09-18 PROCEDURE — 99214 OFFICE O/P EST MOD 30 MIN: CPT | Mod: S$GLB,,, | Performed by: OBSTETRICS & GYNECOLOGY

## 2024-09-18 PROCEDURE — 1159F MED LIST DOCD IN RCRD: CPT | Mod: CPTII,S$GLB,, | Performed by: OBSTETRICS & GYNECOLOGY

## 2024-09-18 PROCEDURE — 99999 PR PBB SHADOW E&M-EST. PATIENT-LVL III: CPT | Mod: PBBFAC,,, | Performed by: OBSTETRICS & GYNECOLOGY

## 2024-09-18 PROCEDURE — 3074F SYST BP LT 130 MM HG: CPT | Mod: CPTII,S$GLB,, | Performed by: OBSTETRICS & GYNECOLOGY

## 2024-09-18 PROCEDURE — 3078F DIAST BP <80 MM HG: CPT | Mod: CPTII,S$GLB,, | Performed by: OBSTETRICS & GYNECOLOGY

## 2024-09-18 PROCEDURE — 4010F ACE/ARB THERAPY RXD/TAKEN: CPT | Mod: CPTII,S$GLB,, | Performed by: OBSTETRICS & GYNECOLOGY

## 2024-09-18 NOTE — PROGRESS NOTES
REFERRING PROVIDER  Mele Jacobson MD    Oncologist:  Cayetano Stearns MD     INTERVAL HISTORY  CC: Postop Follow-up  Meron Lamar is a 59 y.o. with locally advanced rectal cancer s/p FATOU/BSO on 8/16/2021 with me, concurrent with APR / Posterior vaginectomy with Colorectal and VRAM vaginal reconstruction with [place] fasciocutaneous flap by Plastic Surgery.      She has now vulvar or neovaginal concerns. Since our visit last year, she was changed to irinotecan. She had SBRT for a left lung nodule. She now has new small volume metastatic disease per her report. She continues to have a parastomal hernia that has been slowly growing which causes some discomfort and occasional pouching difficulties.     ONCOLOGIC HISTORY   See detailed history from Dr. Jacobson's 10/12/2021 note     8/16/2021 GYN Procedures / Findings / Path: FATOU / BSO  FINDINGS: See Dr. Jacobson's note for additional findings.  Normal external female genitalia.  2-3 cm rectovaginal fistula with posterolateral vaginal thickening and induration.  Cervix normal appearing.  Uterus small and slightly irregular with small fibroids.  The tubes, and ovaries were grossly normal  PATHOLOGY:   Ectocervix:  No histopathologic abnormalities,  negative  for dysplasia   or malignancy.  Endocervix:  Benign nabothian cysts and scattered tunnel clusters,   diffuse tubal metaplasia  negative  for dysplasia or malignancy.  Endometrium:  Inactive endometrium,  negative  for atypia or malignancy. Myometrium:  Multiople leiomyomata (2.5 cm in greatest extent), adenomyosis,  negative  for atypia malignancy.  Serosa:   Positive  for colorectal adenocarcinoma with signet ring cell features.  Fallopian tubes:  Left fallopian tube:  Cystic [place] rest, paratubal cyst,  negative for carcinoma.  Right fallopian tube:  No histopathologic abnormalities,  negative  for carcinoma.  Left ovary:  Cystic follicles and multiple corpora albicantia, negative  for carcinoma.  Right  ovary:  Cystic follicle,  multiple corpora albicantia,  negative for carcinoma.      OBJECTIVE   Vitals:    09/18/24 0936   BP: (!) 124/58   Pulse: 66   Temp: 98.4 °F (36.9 °C)      Body mass index is 39.99 kg/m².     Physical Exam:   Constitutional: She is oriented to person, place, and time. She appears well-developed and well-nourished. No distress.    HENT:   Head: Normocephalic and atraumatic.    Eyes: Conjunctivae and EOM are normal.      Pulmonary/Chest: Effort normal. No respiratory distress.        Abdominal: Soft. She exhibits no distension and no mass. There is no abdominal tenderness. There is no rebound and no guarding. A hernia (parastomal) is present.   Ostomy healthy     Genitourinary:    Genitourinary Comments: Vulva: normal  Vagina: Reconstruction intact, well-healed                 Neurological: She is alert and oriented to person, place, and time.    Skin: No rash noted.    Psychiatric: She has a normal mood and affect. Her behavior is normal.     ECOG status: 0    LABORATORY DATA  Lab data reviewed.    RADIOLOGICAL DATA  Radiology data reviewed.    PATHOLOGY DATA  Pathology data reviewed.    ASSESSMENT    1. Rectal cancer    Ms. Lamar is doing well s/p above procedures 2 year ago.  Vulvar / neovagina exam without significant pathology.  She is welcome to follow-up again in 1 year     PLAN  Patient is welcome to follow up in 1 year for vulvar exam at her discretion          Alejo Mccullough MD

## 2024-10-02 ENCOUNTER — HOSPITAL ENCOUNTER (OUTPATIENT)
Facility: HOSPITAL | Age: 59
Discharge: HOME | End: 2024-10-04
Attending: EMERGENCY MEDICINE | Admitting: COLON & RECTAL SURGERY
Payer: COMMERCIAL

## 2024-10-02 DIAGNOSIS — K92.2 GI BLEEDING: ICD-10-CM

## 2024-10-02 PROCEDURE — 85027 COMPLETE CBC AUTOMATED: CPT

## 2024-10-02 PROCEDURE — 86900 BLOOD TYPING SEROLOGIC ABO: CPT

## 2024-10-02 PROCEDURE — 85007 BL SMEAR W/DIFF WBC COUNT: CPT

## 2024-10-02 PROCEDURE — 80053 COMPREHEN METABOLIC PANEL: CPT

## 2024-10-02 PROCEDURE — 87389 HIV-1 AG W/HIV-1&-2 AB AG IA: CPT | Performed by: PHYSICIAN ASSISTANT

## 2024-10-02 PROCEDURE — 86803 HEPATITIS C AB TEST: CPT | Performed by: PHYSICIAN ASSISTANT

## 2024-10-02 PROCEDURE — 93010 ELECTROCARDIOGRAM REPORT: CPT | Mod: ,,, | Performed by: INTERNAL MEDICINE

## 2024-10-02 PROCEDURE — 85730 THROMBOPLASTIN TIME PARTIAL: CPT

## 2024-10-02 PROCEDURE — 99285 EMERGENCY DEPT VISIT HI MDM: CPT | Mod: 25

## 2024-10-02 PROCEDURE — 85610 PROTHROMBIN TIME: CPT

## 2024-10-02 PROCEDURE — 86850 RBC ANTIBODY SCREEN: CPT

## 2024-10-02 PROCEDURE — 86901 BLOOD TYPING SEROLOGIC RH(D): CPT

## 2024-10-02 PROCEDURE — 93005 ELECTROCARDIOGRAM TRACING: CPT

## 2024-10-02 RX ORDER — SILVER NITRATE 38.21; 12.74 MG/1; MG/1
2 STICK TOPICAL
Status: COMPLETED | OUTPATIENT
Start: 2024-10-03 | End: 2024-10-03

## 2024-10-03 PROBLEM — D62 ACUTE BLOOD LOSS ANEMIA: Status: ACTIVE | Noted: 2024-10-03

## 2024-10-03 LAB
ABO + RH BLD: NORMAL
ALBUMIN SERPL BCP-MCNC: 2.7 G/DL (ref 3.5–5.2)
ALP SERPL-CCNC: 113 U/L (ref 55–135)
ALT SERPL W/O P-5'-P-CCNC: 10 U/L (ref 10–44)
ANION GAP SERPL CALC-SCNC: 10 MMOL/L (ref 8–16)
ANION GAP SERPL CALC-SCNC: 11 MMOL/L (ref 8–16)
ANISOCYTOSIS BLD QL SMEAR: SLIGHT
APTT PPP: 23.1 SEC (ref 21–32)
AST SERPL-CCNC: 51 U/L (ref 10–40)
BASOPHILS # BLD AUTO: 0.04 K/UL (ref 0–0.2)
BASOPHILS # BLD AUTO: 0.06 K/UL (ref 0–0.2)
BASOPHILS NFR BLD: 0 % (ref 0–1.9)
BASOPHILS NFR BLD: 0.6 % (ref 0–1.9)
BASOPHILS NFR BLD: 1 % (ref 0–1.9)
BILIRUB SERPL-MCNC: 0.4 MG/DL (ref 0.1–1)
BLD GP AB SCN CELLS X3 SERPL QL: NORMAL
BLD PROD TYP BPU: NORMAL
BLOOD UNIT EXPIRATION DATE: NORMAL
BLOOD UNIT TYPE CODE: 600
BLOOD UNIT TYPE: NORMAL
BUN SERPL-MCNC: 10 MG/DL (ref 6–20)
BUN SERPL-MCNC: 9 MG/DL (ref 6–20)
CALCIUM SERPL-MCNC: 7.9 MG/DL (ref 8.7–10.5)
CALCIUM SERPL-MCNC: 7.9 MG/DL (ref 8.7–10.5)
CHLORIDE SERPL-SCNC: 106 MMOL/L (ref 95–110)
CHLORIDE SERPL-SCNC: 109 MMOL/L (ref 95–110)
CO2 SERPL-SCNC: 19 MMOL/L (ref 23–29)
CO2 SERPL-SCNC: 21 MMOL/L (ref 23–29)
CODING SYSTEM: NORMAL
CREAT SERPL-MCNC: 0.8 MG/DL (ref 0.5–1.4)
CREAT SERPL-MCNC: 0.9 MG/DL (ref 0.5–1.4)
CROSSMATCH INTERPRETATION: NORMAL
DACRYOCYTES BLD QL SMEAR: ABNORMAL
DIFFERENTIAL METHOD BLD: ABNORMAL
DISPENSE STATUS: NORMAL
EOSINOPHIL # BLD AUTO: 0.1 K/UL (ref 0–0.5)
EOSINOPHIL # BLD AUTO: 0.1 K/UL (ref 0–0.5)
EOSINOPHIL NFR BLD: 1 % (ref 0–8)
EOSINOPHIL NFR BLD: 1 % (ref 0–8)
EOSINOPHIL NFR BLD: 1.2 % (ref 0–8)
ERYTHROCYTE [DISTWIDTH] IN BLOOD BY AUTOMATED COUNT: 20.1 % (ref 11.5–14.5)
ERYTHROCYTE [DISTWIDTH] IN BLOOD BY AUTOMATED COUNT: 20.5 % (ref 11.5–14.5)
ERYTHROCYTE [DISTWIDTH] IN BLOOD BY AUTOMATED COUNT: 24.6 % (ref 11.5–14.5)
EST. GFR  (NO RACE VARIABLE): >60 ML/MIN/1.73 M^2
EST. GFR  (NO RACE VARIABLE): >60 ML/MIN/1.73 M^2
GLUCOSE SERPL-MCNC: 140 MG/DL (ref 70–110)
GLUCOSE SERPL-MCNC: 151 MG/DL (ref 70–110)
HCT VFR BLD AUTO: 17.4 % (ref 37–48.5)
HCT VFR BLD AUTO: 22.7 % (ref 37–48.5)
HCT VFR BLD AUTO: 28.1 % (ref 37–48.5)
HCV AB SERPL QL IA: NORMAL
HGB BLD-MCNC: 4.8 G/DL (ref 12–16)
HGB BLD-MCNC: 6.5 G/DL (ref 12–16)
HGB BLD-MCNC: 8 G/DL (ref 12–16)
HIV 1+2 AB+HIV1 P24 AG SERPL QL IA: NORMAL
HYPOCHROMIA BLD QL SMEAR: ABNORMAL
IMM GRANULOCYTES # BLD AUTO: 0.28 K/UL (ref 0–0.04)
IMM GRANULOCYTES # BLD AUTO: 0.34 K/UL (ref 0–0.04)
IMM GRANULOCYTES # BLD AUTO: ABNORMAL K/UL (ref 0–0.04)
IMM GRANULOCYTES NFR BLD AUTO: 4.8 % (ref 0–0.5)
IMM GRANULOCYTES NFR BLD AUTO: 5 % (ref 0–0.5)
IMM GRANULOCYTES NFR BLD AUTO: ABNORMAL % (ref 0–0.5)
INR PPP: 1.1 (ref 0.8–1.2)
LYMPHOCYTES # BLD AUTO: 0.7 K/UL (ref 1–4.8)
LYMPHOCYTES # BLD AUTO: 0.8 K/UL (ref 1–4.8)
LYMPHOCYTES NFR BLD: 11.6 % (ref 18–48)
LYMPHOCYTES NFR BLD: 12.7 % (ref 18–48)
LYMPHOCYTES NFR BLD: 13 % (ref 18–48)
MCH RBC QN AUTO: 23.6 PG (ref 27–31)
MCH RBC QN AUTO: 24.9 PG (ref 27–31)
MCH RBC QN AUTO: 25.2 PG (ref 27–31)
MCHC RBC AUTO-ENTMCNC: 27.6 G/DL (ref 32–36)
MCHC RBC AUTO-ENTMCNC: 28.5 G/DL (ref 32–36)
MCHC RBC AUTO-ENTMCNC: 28.6 G/DL (ref 32–36)
MCV RBC AUTO: 86 FL (ref 82–98)
MCV RBC AUTO: 87 FL (ref 82–98)
MCV RBC AUTO: 88 FL (ref 82–98)
MONOCYTES # BLD AUTO: 0.5 K/UL (ref 0.3–1)
MONOCYTES # BLD AUTO: 0.5 K/UL (ref 0.3–1)
MONOCYTES NFR BLD: 3 % (ref 4–15)
MONOCYTES NFR BLD: 7.5 % (ref 4–15)
MONOCYTES NFR BLD: 8.8 % (ref 4–15)
NEUTROPHILS # BLD AUTO: 4.2 K/UL (ref 1.8–7.7)
NEUTROPHILS # BLD AUTO: 5 K/UL (ref 1.8–7.7)
NEUTROPHILS NFR BLD: 71.7 % (ref 38–73)
NEUTROPHILS NFR BLD: 74.1 % (ref 38–73)
NEUTROPHILS NFR BLD: 83 % (ref 38–73)
NRBC BLD-RTO: 2 /100 WBC
NUM UNITS TRANS PACKED RBC: NORMAL
OHS QRS DURATION: 84 MS
OHS QTC CALCULATION: 543 MS
OVALOCYTES BLD QL SMEAR: ABNORMAL
PLATELET # BLD AUTO: 106 K/UL (ref 150–450)
PLATELET # BLD AUTO: 79 K/UL (ref 150–450)
PLATELET # BLD AUTO: 92 K/UL (ref 150–450)
PLATELET BLD QL SMEAR: ABNORMAL
PMV BLD AUTO: 10 FL (ref 9.2–12.9)
PMV BLD AUTO: 11.7 FL (ref 9.2–12.9)
PMV BLD AUTO: 9.3 FL (ref 9.2–12.9)
POIKILOCYTOSIS BLD QL SMEAR: SLIGHT
POLYCHROMASIA BLD QL SMEAR: ABNORMAL
POTASSIUM SERPL-SCNC: 3.7 MMOL/L (ref 3.5–5.1)
POTASSIUM SERPL-SCNC: 4.7 MMOL/L (ref 3.5–5.1)
PROT SERPL-MCNC: 5 G/DL (ref 6–8.4)
PROTHROMBIN TIME: 11.8 SEC (ref 9–12.5)
RBC # BLD AUTO: 2.03 M/UL (ref 4–5.4)
RBC # BLD AUTO: 2.61 M/UL (ref 4–5.4)
RBC # BLD AUTO: 3.18 M/UL (ref 4–5.4)
SODIUM SERPL-SCNC: 138 MMOL/L (ref 136–145)
SODIUM SERPL-SCNC: 138 MMOL/L (ref 136–145)
SPECIMEN OUTDATE: NORMAL
SPHEROCYTES BLD QL SMEAR: ABNORMAL
WBC # BLD AUTO: 5.82 K/UL (ref 3.9–12.7)
WBC # BLD AUTO: 6.79 K/UL (ref 3.9–12.7)
WBC # BLD AUTO: 8.22 K/UL (ref 3.9–12.7)

## 2024-10-03 PROCEDURE — P9016 RBC LEUKOCYTES REDUCED: HCPCS

## 2024-10-03 PROCEDURE — G0378 HOSPITAL OBSERVATION PER HR: HCPCS

## 2024-10-03 PROCEDURE — 85007 BL SMEAR W/DIFF WBC COUNT: CPT | Mod: NCS | Performed by: STUDENT IN AN ORGANIZED HEALTH CARE EDUCATION/TRAINING PROGRAM

## 2024-10-03 PROCEDURE — 25000003 PHARM REV CODE 250

## 2024-10-03 PROCEDURE — 36430 TRANSFUSION BLD/BLD COMPNT: CPT

## 2024-10-03 PROCEDURE — 25000003 PHARM REV CODE 250: Performed by: STUDENT IN AN ORGANIZED HEALTH CARE EDUCATION/TRAINING PROGRAM

## 2024-10-03 PROCEDURE — 86920 COMPATIBILITY TEST SPIN: CPT

## 2024-10-03 PROCEDURE — 80048 BASIC METABOLIC PNL TOTAL CA: CPT | Performed by: STUDENT IN AN ORGANIZED HEALTH CARE EDUCATION/TRAINING PROGRAM

## 2024-10-03 PROCEDURE — 85025 COMPLETE CBC W/AUTO DIFF WBC: CPT | Performed by: STUDENT IN AN ORGANIZED HEALTH CARE EDUCATION/TRAINING PROGRAM

## 2024-10-03 PROCEDURE — 85027 COMPLETE CBC AUTOMATED: CPT | Performed by: STUDENT IN AN ORGANIZED HEALTH CARE EDUCATION/TRAINING PROGRAM

## 2024-10-03 RX ORDER — HYDROCODONE BITARTRATE AND ACETAMINOPHEN 500; 5 MG/1; MG/1
TABLET ORAL
Status: DISCONTINUED | OUTPATIENT
Start: 2024-10-03 | End: 2024-10-04 | Stop reason: HOSPADM

## 2024-10-03 RX ORDER — ACETAMINOPHEN 325 MG/1
650 TABLET ORAL EVERY 4 HOURS PRN
Status: DISCONTINUED | OUTPATIENT
Start: 2024-10-03 | End: 2024-10-04 | Stop reason: HOSPADM

## 2024-10-03 RX ORDER — SODIUM CHLORIDE 0.9 % (FLUSH) 0.9 %
10 SYRINGE (ML) INJECTION
Status: DISCONTINUED | OUTPATIENT
Start: 2024-10-03 | End: 2024-10-04 | Stop reason: HOSPADM

## 2024-10-03 RX ORDER — NALOXONE HCL 0.4 MG/ML
0.02 VIAL (ML) INJECTION
Status: DISCONTINUED | OUTPATIENT
Start: 2024-10-03 | End: 2024-10-04 | Stop reason: HOSPADM

## 2024-10-03 RX ORDER — ESCITALOPRAM OXALATE 10 MG/1
10 TABLET ORAL NIGHTLY
Status: DISCONTINUED | OUTPATIENT
Start: 2024-10-03 | End: 2024-10-04 | Stop reason: HOSPADM

## 2024-10-03 RX ORDER — LEVOTHYROXINE SODIUM 25 UG/1
25 TABLET ORAL
Status: DISCONTINUED | OUTPATIENT
Start: 2024-10-03 | End: 2024-10-04 | Stop reason: HOSPADM

## 2024-10-03 RX ORDER — ONDANSETRON HYDROCHLORIDE 2 MG/ML
4 INJECTION, SOLUTION INTRAVENOUS EVERY 8 HOURS PRN
Status: DISCONTINUED | OUTPATIENT
Start: 2024-10-03 | End: 2024-10-04 | Stop reason: HOSPADM

## 2024-10-03 RX ORDER — LOSARTAN POTASSIUM 50 MG/1
100 TABLET ORAL DAILY
Status: DISCONTINUED | OUTPATIENT
Start: 2024-10-03 | End: 2024-10-04 | Stop reason: HOSPADM

## 2024-10-03 RX ORDER — HYDROCODONE BITARTRATE AND ACETAMINOPHEN 500; 5 MG/1; MG/1
TABLET ORAL
Status: DISCONTINUED | OUTPATIENT
Start: 2024-10-03 | End: 2024-10-03

## 2024-10-03 RX ORDER — AMLODIPINE BESYLATE 5 MG/1
5 TABLET ORAL DAILY
Status: DISCONTINUED | OUTPATIENT
Start: 2024-10-03 | End: 2024-10-04 | Stop reason: HOSPADM

## 2024-10-03 RX ORDER — METOPROLOL SUCCINATE 100 MG/1
100 TABLET, EXTENDED RELEASE ORAL NIGHTLY
Status: DISCONTINUED | OUTPATIENT
Start: 2024-10-03 | End: 2024-10-04 | Stop reason: HOSPADM

## 2024-10-03 RX ORDER — ACYCLOVIR 200 MG/1
400 CAPSULE ORAL 2 TIMES DAILY
Status: DISCONTINUED | OUTPATIENT
Start: 2024-10-03 | End: 2024-10-04 | Stop reason: HOSPADM

## 2024-10-03 RX ORDER — GABAPENTIN 100 MG/1
100 CAPSULE ORAL 2 TIMES DAILY
Status: DISCONTINUED | OUTPATIENT
Start: 2024-10-03 | End: 2024-10-04 | Stop reason: HOSPADM

## 2024-10-03 RX ADMIN — GABAPENTIN 100 MG: 100 CAPSULE ORAL at 08:10

## 2024-10-03 RX ADMIN — ACYCLOVIR 400 MG: 200 CAPSULE ORAL at 08:10

## 2024-10-03 RX ADMIN — LEVOTHYROXINE SODIUM 25 MCG: 25 TABLET ORAL at 07:10

## 2024-10-03 RX ADMIN — ESCITALOPRAM OXALATE 10 MG: 10 TABLET ORAL at 08:10

## 2024-10-03 RX ADMIN — LOSARTAN POTASSIUM 100 MG: 50 TABLET, FILM COATED ORAL at 08:10

## 2024-10-03 RX ADMIN — SILVER NITRATE APPLICATORS 2 APPLICATOR: 25; 75 STICK TOPICAL at 12:10

## 2024-10-03 RX ADMIN — AMLODIPINE BESYLATE 5 MG: 5 TABLET ORAL at 08:10

## 2024-10-03 RX ADMIN — METOPROLOL SUCCINATE 100 MG: 100 TABLET, EXTENDED RELEASE ORAL at 08:10

## 2024-10-03 NOTE — ASSESSMENT & PLAN NOTE
Not being addressed during this hospitalization. Hx as follows for reference:    8/28/20: colonoscopy with lesion  10/12/20:  Locally invasive rectal cancer Dx   11/06/2020:  Liver biopsy consistent with metastatic colorectal adenocarcinoma.  Tolerated short course XRT from 10/26/20 to 10/20/20.  Neoadjuvant FOLFOX started 11/17/2020. She got 3 cycles and stopped after 12/15/21.  Stopped for vaginal drainage with RV fistula.   1/13/2021:  Underwent laparoscopic loop sigmoid colostomy secondary to rectovaginal fistula  2/3/21: FOLFOXIRI   4/6/2021: Robotic assisted segment 6/7 liver resection (right posterior hepatectomy), microwave ablation segment 8 lesion for met adenocarcinoma, +margins  8/16/2021: APR for RVF and signet ring adenocarcinoma +LVI, FATOU+BSO, EAO release for component separation, pedicle flap for perineal closure  10/10/23:   diagnosed with a pulmonary nodule consistent with metastatic disease.  9/9/2022:  Surveillance colonoscopy performed.

## 2024-10-03 NOTE — PROGRESS NOTES
[facility] - [facility] Dept  Colorectal Surgery  Progress Note    Patient Name: Meron Lamar  MRN: [id number]  Admission Date: 10/2/2024  Hospital Length of Stay: 0 days  Attending Physician: RAHUL Jacobson MD    Subjective:     Interval History:   Feels better, denies dizziness, SOB, palpitations, nausea. Partner believes she is less pale now.     Medications:  Continuous Infusions:  Scheduled Meds:   acyclovir  400 mg Oral BID    amLODIPine  5 mg Oral Daily    EScitalopram oxalate  10 mg Oral QHS    gabapentin  100 mg Oral BID    levothyroxine  25 mcg Oral Before breakfast    losartan  100 mg Oral Daily    metoprolol succinate  100 mg Oral Nightly     PRN Meds:   acyclovir capsule 400 mg    amLODIPine tablet 5 mg    EScitalopram oxalate tablet 10 mg    gabapentin capsule 100 mg    levothyroxine tablet 25 mcg    losartan tablet 100 mg    metoprolol succinate (TOPROL-XL) 24 hr tablet 100 mg        Objective:     Vital Signs (Most Recent):  Temp: 98 °F (36.7 °C) (10/03/24 0719)  Pulse: 84 (10/03/24 0800)  Resp: 15 (10/03/24 0800)  BP: (!) 154/66 (10/03/24 0800)  SpO2: 100 % (10/03/24 0800) Vital Signs (24h Range):  Temp:  [98 °F (36.7 °C)-99.6 °F (37.6 °C)] 98 °F (36.7 °C)  Pulse:  [83-92] 84  Resp:  [10-22] 15  SpO2:  [95 %-100 %] 100 %  BP: (119-154)/(56-67) 154/66     Intake/Output - Last 3 Shifts         10/01 0700  10/02 0659 10/02 0700  10/03 0659 10/03 0700  10/04 0659    Blood   350    Total Intake(mL/kg)   350 (3.3)    Net   +350                  General: alert, awake, in no acute distress  HEENT: EOMI, no scleral icterus, CN grossly intact  Cardiac: RRR, BP stable  Pulm: non labored breathing on room air, no audible wheeze  Abdomen: soft, non distended, non TTP, obese, midline incision well healed, stoma pink and productive with no gross red blood in the bag, liquid loose succus in the bag  Extremities: moving all four extremities  Skin: no obvious rashes, pale  Neuro: no obvious deficits  Psych:  cooperative, appropriate affect  Anorectal: deferred     Significant Labs:  BMP (Last 3 Results):   Recent Labs   Lab 10/02/24  2320 10/03/24  0752   * 140*    138   K 4.7 3.7    106   CO2 19* 21*   BUN 10 9   CREATININE 0.9 0.8   CALCIUM 7.9* 7.9*     CBC (Last 3 Results):   Recent Labs   Lab 10/02/24  2320 10/03/24  0752   WBC 8.22 5.82   RBC 2.03* 2.61*   HGB 4.8* 6.5*   HCT 17.4* 22.7*   * 79*   MCV 86 87   MCH 23.6* 24.9*   MCHC 27.6* 28.6*       Significant Diagnostics:  None    Assessment/Plan:     * Attention to colostomy  Acute bleed from varix on stoma and granulation tissue, cauterized at bedside.  - received 2u prbc, Hgb response appropriate but still low, ordered a 3rd unit with a post transfusion cbc  - ok for diet and home medications  - monitor stoma for rebleed, could DC later today if remains stable and labs appropriate    Rectal cancer  Not being addressed during this hospitalization. Hx as follows for reference:    8/28/20: colonoscopy with lesion  10/12/20:  Locally invasive rectal cancer Dx   11/06/2020:  Liver biopsy consistent with metastatic colorectal adenocarcinoma.  Tolerated short course XRT from 10/26/20 to 10/20/20.  Neoadjuvant FOLFOX started 11/17/2020. She got 3 cycles and stopped after 12/15/21.  Stopped for vaginal drainage with RV fistula.   1/13/2021:  Underwent laparoscopic loop sigmoid colostomy secondary to rectovaginal fistula  2/3/21: FOLFOXIRI   4/6/2021: Robotic assisted segment 6/7 liver resection (right posterior hepatectomy), microwave ablation segment 8 lesion for met adenocarcinoma, +margins  8/16/2021: APR for RVF and signet ring adenocarcinoma +LVI, FATOU+BSO, EAO release for component separation, pedicle flap for perineal closure  10/10/23:   diagnosed with a pulmonary nodule consistent with metastatic disease.  9/9/2022:  Surveillance colonoscopy performed.    Type 2 diabetes mellitus without complication, without long-term current use of  insulin  - Not on meds at home  - carb controleld diet        Jordyn Adams MD  Colorectal Surgery  [facility] Dept

## 2024-10-03 NOTE — SUBJECTIVE & OBJECTIVE
Subjective:     Interval History:   Feels better, denies dizziness, SOB, palpitations, nausea. Partner believes she is less pale now.     Medications:  Continuous Infusions:  Scheduled Meds:   acyclovir  400 mg Oral BID    amLODIPine  5 mg Oral Daily    EScitalopram oxalate  10 mg Oral QHS    gabapentin  100 mg Oral BID    levothyroxine  25 mcg Oral Before breakfast    losartan  100 mg Oral Daily    metoprolol succinate  100 mg Oral Nightly     PRN Meds:   acyclovir capsule 400 mg    amLODIPine tablet 5 mg    EScitalopram oxalate tablet 10 mg    gabapentin capsule 100 mg    levothyroxine tablet 25 mcg    losartan tablet 100 mg    metoprolol succinate (TOPROL-XL) 24 hr tablet 100 mg        Objective:     Vital Signs (Most Recent):  Temp: 98 °F (36.7 °C) (10/03/24 0719)  Pulse: 84 (10/03/24 0800)  Resp: 15 (10/03/24 0800)  BP: (!) 154/66 (10/03/24 0800)  SpO2: 100 % (10/03/24 0800) Vital Signs (24h Range):  Temp:  [98 °F (36.7 °C)-99.6 °F (37.6 °C)] 98 °F (36.7 °C)  Pulse:  [83-92] 84  Resp:  [10-22] 15  SpO2:  [95 %-100 %] 100 %  BP: (119-154)/(56-67) 154/66     Intake/Output - Last 3 Shifts         10/01 0700  10/02 0659 10/02 0700  10/03 0659 10/03 0700  10/04 0659    Blood   350    Total Intake(mL/kg)   350 (3.3)    Net   +350                  General: alert, awake, in no acute distress  HEENT: EOMI, no scleral icterus, CN grossly intact  Cardiac: RRR, BP stable  Pulm: non labored breathing on room air, no audible wheeze  Abdomen: soft, non distended, non TTP, obese, midline incision well healed, stoma pink and productive with no gross red blood in the bag, liquid loose succus in the bag  Extremities: moving all four extremities  Skin: no obvious rashes, pale  Neuro: no obvious deficits  Psych: cooperative, appropriate affect  Anorectal: deferred     Significant Labs:  BMP (Last 3 Results):   Recent Labs   Lab 10/02/24  2320 10/03/24  0752   * 140*    138   K 4.7 3.7    106   CO2 19* 21*   BUN  10 9   CREATININE 0.9 0.8   CALCIUM 7.9* 7.9*     CBC (Last 3 Results):   Recent Labs   Lab 10/02/24  2320 10/03/24  0752   WBC 8.22 5.82   RBC 2.03* 2.61*   HGB 4.8* 6.5*   HCT 17.4* 22.7*   * 79*   MCV 86 87   MCH 23.6* 24.9*   MCHC 27.6* 28.6*       Significant Diagnostics:  None

## 2024-10-03 NOTE — ED PROVIDER NOTES
"Encounter Date: 10/2/2024       History     Chief Complaint   Patient presents with    Rectal Bleeding     Patient reports that she is on chemo for colon cancer. States that since 130 this afternoon has had some cranberry color blood in colostomy, with clots.      Meron Lamar this is a 59-year-old female with history of colon cancer s/p diverting ostomy, active on chemo (last chemo roughly 10 days ago) presenting with pallor and concern for bleeding through her stoma.  Patient states she has had episodes of bleeding from a "burst capillary" before that was controllable with direct pressure, but this has not been as easy to control.  She started noticing it this morning when changing her ostomy bag, noted to have small amount of in red drainage and a constant stream of bleeding around her ostomy.  Direct pressure did not control it and has had to drain her ostomy 3 or 4 times since this morning, all with significant amounts of blood and clots.  Patient states she is pale, which her spouse also endorses.  Has had mild fatigue and shortness of breath today, no other areas of pain, no bloody or melenic stool, denies N/V/D, recent illness/infection, recent fever.    Patient states she has previously had trouble with anemia, requiring Epogen, iron transfusions, and blood transfusions.         Review of patient's allergies indicates:  No Known Allergies  Past Medical History:   Diagnosis Date    Colon cancer Oc t 2020    rectal     Depression     Diabetes mellitus     pre diabetic    Hypertension     Postoperative hypothyroidism 10/06/2020    Rectal cancer 10/06/2020    Rectal cancer     Renal disorder     kidney stone    Sleep apnea     c pap machine in use    Thyroid disease     Tumor of lung      Past Surgical History:   Procedure Laterality Date    ABDOMINOPERINEAL RESECTION OF RECTUM N/A 8/16/2021    Procedure: PROCTECTOMY, ABDOMINOPERINEAL;  Surgeon: RAHUL Jacobson MD;  Location: [facility] OR [facility] FLR;  " Service: Colon and Rectal;  Laterality: N/A;  ERAS high    COLONOSCOPY N/A 9/9/2022    Procedure: COLONOSCOPY through stoma;  Surgeon: RAHUL Jacobson MD;  Location: [facility] (4TH FLR);  Service: Endoscopy;  Laterality: N/A;  fully vacc to bring card  inst emailed and via portal  clear liquids 4hr prior-AM Prep-LW    CREATION OF MUSCLE ROTATIONAL FLAP  8/16/2021    Procedure: CREATION, FLAP, MUSCLE ROTATION;  Surgeon: RAHUL Jacobson MD;  Location: [facility] OR 2ND FLR;  Service: Colon and Rectal;;    CREATION OF MUSCLE ROTATIONAL FLAP N/A 8/16/2021    Procedure: CREATION, FLAP, MUSCLE ROTATION;  Surgeon: Nicholas Saez MD;  [facility];  Service: Plastics;  Laterality: N/A;  Vertical rectus abdominis flap    ESOPHAGOGASTRODUODENOSCOPY      EXAMINATION UNDER ANESTHESIA N/A 10/12/2020    Procedure: Exam under anesthesia, flex sig;  Surgeon: Blake Jacobson MD;  [facility];  Service: Endoscopy;  Laterality: N/A;  Rectal and vaginal areas    FISTULA REPAIR      anal fistula    FLAP PROCEDURE N/A 8/16/2021    Procedure: Fascia flap creation;  Surgeon: Nicholas Saez MD;  [facility];  Service: Plastics;  Laterality: N/A;    FLEXIBLE SIGMOIDOSCOPY  10/12/2020    Procedure: SIGMOIDOSCOPY, FLEXIBLE;  Surgeon: Blake Jacobson MD;  [facility];  Service: Endoscopy;;    FUSION OF CERVICAL SPINE BY POSTERIOR APPROACH      anterior and posterior    RADIOFREQUENCY ABLATION OF LIVER LESION  4/6/2021    Procedure: RADIOFREQUENCY ABLATION, LESION, LIVER Robotic of segment 8 ;  Surgeon: Luciano Castaneda MD;  [facility];  Service: General;;    REVISION OF COLOSTOMY WITH REPAIR OF PARACOLOSTOMY HERNIA N/A 8/16/2021    Procedure: REVISION, COLOSTOMY, WITH PARACOLOSTOMY HERNIA REPAIR;  Surgeon: RAHUL Jacobson MD;  [facility];  Service: Colon and Rectal;  Laterality: N/A;    ROBOT-ASSISTED LAPAROSCOPIC PARTIAL  SURGICAL REMOVAL OF LIVER USING DA MARBELLA XI N/A 4/6/2021    Procedure: XI ROBOTIC RESECTION, LIVER - RIGHT POSTERIOR - NEED DROP IN BK PROBE;  Surgeon: Luciano Castaneda MD;  [facility];  Service: General;  Laterality: N/A;    TOTAL ABDOMINAL HYSTERECTOMY W/ BILATERAL SALPINGOOPHORECTOMY N/A 8/16/2021    Procedure: HYSTERECTOMY, TOTAL, ABDOMINAL, WITH BILATERAL SALPINGO-OOPHORECTOMY;  Surgeon: Alejo Mccullough MD;  [facility];  Service: OB/GYN;  Laterality: N/A;    VAGINA RECONSTRUCTION SURGERY N/A 8/16/2021    Procedure: RECONSTRUCTION, VAGINA;  Surgeon: Nicholas Saez MD;  [facility];  Service: Plastics;  Laterality: N/A;    VAGINECTOMY N/A 8/16/2021    Procedure: VAGINECTOMY;  Surgeon: RAHUL Jacobson MD;  [facility];  Service: Colon and Rectal;  Laterality: N/A;  Posterior     Family History   Problem Relation Name Age of Onset    Breast cancer Mother  44    Cancer Mother      Heart disease Father      Hyperlipidemia Father      Prostate cancer Father      Breast cancer Maternal Grandmother      Cancer Paternal Grandmother          ? female origin    Ovarian cancer Neg Hx      Uterine cancer Neg Hx      Colon cancer Neg Hx       Social History     Tobacco Use    Smoking status: Never    Smokeless tobacco: Never   Substance Use Topics    Alcohol use: Never    Drug use: Never     Review of Systems  10-point Review of Systems was performed and is negative/non-contributory unless otherwise stated in above HPI.   Physical Exam     Initial Vitals [10/02/24 2130]   BP Pulse Resp Temp SpO2   (!) 142/64 89 18 99.6 °F (37.6 °C) 98 %      MAP       --         Physical Exam    Constitutional: She appears well-developed and well-nourished. She is not diaphoretic. She appears ill. No distress.   HENT:   Head: Normocephalic and atraumatic. Mouth/Throat: Oropharynx is clear and moist.   Eyes: Conjunctivae and EOM are normal. Pupils are equal, round, and  reactive to light.   Conjunctival pallor noted   Neck:   Normal range of motion.  Cardiovascular:  Normal rate, regular rhythm, normal heart sounds and intact distal pulses.     Exam reveals no gallop and no friction rub.       No murmur heard.  Pulmonary/Chest: Breath sounds normal. No respiratory distress. She has no wheezes. She has no rhonchi. She has no rales.   Abdominal: Abdomen is soft. She exhibits no distension and no mass. There is no abdominal tenderness. A hernia is present.     There is no rebound, no guarding and negative Escamilla's sign.   Musculoskeletal:         General: Edema present. Normal range of motion.      Cervical back: Normal range of motion.     Neurological: She is alert and oriented to person, place, and time.   Skin: Skin is dry. There is pallor.         ED Course   Procedures  Labs Reviewed   CBC W/ AUTO DIFFERENTIAL - Abnormal       Result Value    WBC 8.22      RBC 2.03 (*)     Hemoglobin 4.8 (*)     Hematocrit 17.4 (*)     MCV 86      MCH 23.6 (*)     MCHC 27.6 (*)     RDW 24.6 (*)     Platelets 106 (*)     MPV 11.7      Immature Granulocytes CANCELED      Immature Grans (Abs) CANCELED      nRBC 2 (*)     Gran % 83.0 (*)     Lymph % 13.0 (*)     Mono % 3.0 (*)     Eosinophil % 1.0      Basophil % 0.0      Platelet Estimate Decreased (*)     Aniso Slight      Poik Slight      Poly Occasional      Hypo Occasional      Ovalocytes Occasional      Tear Drop Cells Occasional      Spherocytes Occasional      Differential Method Manual      Narrative:     HGB & HCT critical result(s) called and verbal readback obtained from   SENG FERRELL RN by GAS1 10/03/2024 00:29   COMPREHENSIVE METABOLIC PANEL - Abnormal    Sodium 138      Potassium 4.7      Chloride 109      CO2 19 (*)     Glucose 151 (*)     BUN 10      Creatinine 0.9      Calcium 7.9 (*)     Total Protein 5.0 (*)     Albumin 2.7 (*)     Total Bilirubin 0.4      Alkaline Phosphatase 113      AST 51 (*)     ALT 10      eGFR >60.0       Anion Gap 10     HIV 1 / 2 ANTIBODY    HIV 1/2 Ag/Ab Non-reactive      Narrative:     Release to patient->Immediate   HEPATITIS C ANTIBODY    Hepatitis C Ab Non-reactive      Narrative:     Release to patient->Immediate   PROTIME-INR    Prothrombin Time 11.8      INR 1.1     APTT    aPTT 23.1     CBC W/ AUTO DIFFERENTIAL   BASIC METABOLIC PANEL   TYPE & SCREEN    Group & Rh A NEG      Indirect Ness NEG      Specimen Outdate 10/05/2024 23:59     PREPARE RBC SOFT    UNIT NUMBER [id number]      Product Code X0528B33      DISPENSE STATUS CROSSMATCHED      CODING SYSTEM [id number]      Unit Blood Type Code 0600      Unit Blood Type A NEG      Unit Expiration [id number]      CROSSMATCH INTERPRETATION Compatible      UNIT NUMBER [id number]      Product Code J5032D49      DISPENSE STATUS ISSUED      CODING SYSTEM [id number]      Unit Blood Type Code 0600      Unit Blood Type A NEG      Unit Expiration [id number]      CROSSMATCH INTERPRETATION Compatible      UNIT NUMBER [id number]      Product Code O7562T34      DISPENSE STATUS CROSSMATCHED      CODING SYSTEM [id number]      Unit Blood Type Code 0600      Unit Blood Type A NEG      Unit Expiration [id number]      CROSSMATCH INTERPRETATION Compatible            Imaging Results    None          Medications   0.9%  NaCl infusion (for blood administration) (has no administration in time range)   acyclovir capsule 400 mg (has no administration in time range)   amLODIPine tablet 5 mg (has no administration in time range)   EScitalopram oxalate tablet 10 mg (has no administration in time range)   gabapentin capsule 100 mg (has no administration in time range)   levothyroxine tablet 25 mcg (has no administration in time range)   metoprolol succinate (TOPROL-XL) 24 hr tablet 100 mg (has no administration in time range)   losartan tablet 100 mg (has no administration in time range)   sodium chloride 0.9% flush 10 mL (has no administration in time range)   naloxone 0.4 mg/mL  injection 0.02 mg (has no administration in time range)   acetaminophen tablet 650 mg (has no administration in time range)   ondansetron injection 4 mg (has no administration in time range)   silver nitrate applicators applicator 2 applicator (2 applicators Topical (Top) Given by Other 10/3/24 0000)     Medical Decision Making  Meron Lamar this is a 59-year-old female with history of colon cancer s/p diverting ostomy, active on chemo (last chemo roughly 10 days ago) presenting with pallor and concern for a bleeding stoma. History and physical exam as above. Initial vital signs stable and non-actionable. Initial work-up to include: Labs (CBC, CMP, PT-INR, PTT, type and screen), EKG, colorectal consult    Upon examination of her stoma with colorectal surgery, was noted to have a small bleeding blood vessel on the very edge of her stoma, with some hemostatic control with direct pressure.  Ordered silver nitrate to cauterize focus of bleeding, which was effective.  Results of CBC showing hemoglobin 4.8, hematocrit 17.4 without increased coagulation studies, overall concerning for significant blood loss anemia.  Patient consented for blood transfusion and ordered 3 units PRVC prepped, we will transfuse 2 units initially.    Discussed with colorectal surgery regarding recommendations for her blood loss anemia given active chemo.  Consulted team recommended admission to CRS for further workup and treatment of her anemia.        Amount and/or Complexity of Data Reviewed  Labs: ordered.    Risk  Prescription drug management.               ED Course as of 10/03/24 0112   Wed Oct 02, 2024   2342 EKG independently interpreted by me done at 11:37 p.m. Normal sinus rhythm rate of 87 normal axis nonspecific ST abnormality [GK]      ED Course User Index  [GK] Анна Velásquez MD                           Clinical Impression:  Final diagnoses:  [K92.2] GI bleeding          ED Disposition Condition    Observation                  Luis Lee MD  Resident  10/03/24 0112

## 2024-10-03 NOTE — HOSPITAL COURSE
10/2 admit to crs for obs for bleeding stoma Hgb 4.8  10/3 normalized, post transfusion CBC pending. Bleeding controlled with pressure and silver nitrate chemical cauterization   She received a total of 3 units while admitted.   Hgb 7.4 10/4/2024  On day of discharge pt is rachel regular diet, inc line healing well, positive for bm with flatus, ambulating in [place] without difficulty, VS stable and afebrile.

## 2024-10-03 NOTE — HPI
59 F hx APR and end colostomy who noted BRB in her stoma bag this am. She changed her appliance and noticed blood was coming from the edge of the stoma/skin. She initially held pressure, which seemed to control it, but then later after she replaced the appliance she began to note blood within the bag throughout the day. She presented to the ED for evaluation.

## 2024-10-03 NOTE — NURSING
Patient arrived from ED on bed AAOx4 with clear speech, transferred from bed to sofa independently with steady controlled gait. Ileostomy bag draining dark brown liquid feces noted to left lower quadrant abdomen is distended and rounded, stoma has rosebud appearance other than stoma skin is intact. Patient oriented to the room, bathroom, call light system, and meal times. No acute distress noted at this time.

## 2024-10-03 NOTE — ED NOTES
Assumed care. 3rd unit of blood transfusing. Pt ambulated to and from restroom and reports decreased dyspnea and dizziness. No complaints at this time. VSS.    LOC: The patient is awake, alert and aware of environment with an appropriate affect, the patient is oriented x 3 and speaking appropriately.  APPEARANCE: Patient resting comfortably and in no acute distress, patient is clean and well groomed, patient's clothing is properly fastened.  SKIN: The skin is warm and dry, color pale, patient has normal skin turgor and moist mucus membranes, skin intact, no breakdown or bruising noted.  MUSCULOSKELETAL: Patient moving all extremities spontaneously, no obvious swelling or deformities noted.  RESPIRATORY: Airway is open and patent, respirations are spontaneous, patient has a normal effort and rate, no accessory muscle use noted.  CARDIAC: Patient has a normal rate and regular rhythm, no periphreal edema noted, capillary refill < 3 seconds.  ABDOMEN: Soft and non tender to palpation, no distention noted, normoactive bowel sounds present in all four quadrants. Colostomy noted with small amount of tuyet blood.  NEUROLOGIC:  facial expression is symmetrical, patient moving all extremities spontaneously, normal sensation in all extremities when touched with a finger.  Follows all commands appropriately.

## 2024-10-03 NOTE — PLAN OF CARE
[facility] - Emergency Dept  Initial Discharge Assessment       Primary Care Provider: Qasim Boston MD    Admission Diagnosis: GI bleeding [K92.2]    Admission Date: 10/2/2024  Expected Discharge Date:     Pt stated she is independent with her ambulation and ADL's and does not require assistance or equipment.    Pt stated she can d/c home with no needs    Transition of Care Barriers: (P) None    Payor: [facility] / Plan: [facility] CHOICE PLUS / Product Type: Commercial /     Extended Emergency Contact Information  Primary Emergency Contact: Kvng Inman  Mobile Phone: [phone]  Relation: Significant other  Secondary Emergency Contact: Fausto Lamar  Mobile Phone: [phone]  Relation: Brother    Discharge Plan A: Home  Discharge Plan B: Home      [id number] - [place], [place] - [address]  [address]  [place] [address]  Phone: [phone] Fax: [phone]      Initial Assessment (most recent)       Adult Discharge Assessment - 10/03/24 0840          Discharge Assessment    Assessment Type Discharge Planning Assessment     Confirmed/corrected address, phone number and insurance Yes     Confirmed Demographics Correct on Facesheet     Source of Information patient     Reason For Admission Acute blood loss anemia     People in Home significant other     Facility Arrived From: home     Do you expect to return to your current living situation? Yes     Do you have help at home or someone to help you manage your care at home? No     Prior to hospitilization cognitive status: Alert/Oriented;No Deficits     Current cognitive status: No Deficits;Alert/Oriented     Walking or Climbing Stairs Difficulty no     Dressing/Bathing Difficulty no     Home Accessibility wheelchair accessible     Home Layout Able to live on 1st floor     Equipment Currently Used at Home none     Patient currently being followed by outpatient case management? No     Do you currently have service(s) that help you manage your  care at home? No     Do you have any problems affording any of your prescribed medications? No     Is the patient taking medications as prescribed? yes     Who is going to help you get home at discharge? family/friends     How do you get to doctors appointments? car, drives self     Are you on dialysis? No     Do you take coumadin? No     Discharge Plan A Home     Discharge Plan B Home     DME Needed Upon Discharge  none     Discharge Plan discussed with: Patient (P)      Transition of Care Barriers None (P)         Physical Activity    On average, how many days per week do you engage in moderate to strenuous exercise (like a brisk walk)? 0 days (P)      On average, how many minutes do you engage in exercise at this level? 0 min (P)         Financial Resource Strain    How hard is it for you to pay for the very basics like food, housing, medical care, and heating? Not very hard (P)         Housing Stability    In the last 12 months, was there a time when you were not able to pay the mortgage or rent on time? No (P)      At any time in the past 12 months, were you homeless or living in a shelter (including now)? No (P)         Transportation Needs    Has the lack of transportation kept you from medical appointments, meetings, work or from getting things needed for daily living? No (P)         Food Insecurity    Within the past 12 months, you worried that your food would run out before you got the money to buy more. Never true (P)      Within the past 12 months, the food you bought just didn't last and you didn't have money to get more. Never true (P)         Stress    Do you feel stress - tense, restless, nervous, or anxious, or unable to sleep at night because your mind is troubled all the time - these days? To some extent (P)         Social Isolation    How often do you feel lonely or isolated from those around you?  Rarely (P)         Alcohol Use    Q1: How often do you have a drink containing alcohol? Never (P)       Q2: How many drinks containing alcohol do you have on a typical day when you are drinking? Patient does not drink (P)      Q3: How often do you have six or more drinks on one occasion? Never (P)         Utilities    In the past 12 months has the electric, gas, oil, or water company threatened to shut off services in your home? No (P)         Health Literacy    How often do you need to have someone help you when you read instructions, pamphlets, or other written material from your doctor or pharmacy? Rarely (P)         OTHER    Name(s) of People in Home significant other Kvng (P)                    Monet Ny CD, MSW, LMSW, RSW   Case Management  [facility]  Email: [email or web address]

## 2024-10-03 NOTE — CONSULTS
[facility]  Colorectal Surgery  Consult Note    Patient Name: Meron Lamar  MRN: [id number]  Admission Date: 10/2/2024  Hospital Length of Stay: 0 days  Attending Physician: Анна Velásquez,*  Primary Care Provider: Qasim Boston MD    Inpatient consult to Colorectal Surgery  Consult performed by: Luis Camejo MD  Consult ordered by: Luis Lee MD        Subjective:     Chief Complaint/Reason for Admission: Stoma bleeding    History of Present Illness: 59 F hx APR and end colostomy who noted BRB in her stoma bag this am. She changed her appliance and noticed blood was coming from the edge of the stoma/skin. She initially held pressure, which seemed to control it, but then later after she replaced the appliance she began to note blood within the bag throughout the day. She presented to the ED for evaluation.     Lab studies are pending at this time. CRS was consulted to evaluate.     Currently, the patient feels ok. She states she had a single episode where she felt nauseated and light-headed earlier, but feels ok now.     The appliance has not been taken down as yet.     Current Facility-Administered Medications   Medication    silver nitrate applicators applicator 2 applicator     Current Outpatient Medications   Medication Sig    acetaminophen (TYLENOL EXTRA STRENGTH) 500 MG tablet Take 2 tablets by oral route.    acyclovir (ZOVIRAX) 400 MG tablet Take 400 mg by mouth 2 (two) times daily.    amLODIPine (NORVASC) 5 MG tablet Take 5 mg by mouth once daily.    escitalopram oxalate (LEXAPRO) 10 MG tablet Take 10 mg by mouth every evening.     gabapentin (NEURONTIN) 100 MG capsule Take 100 mg by mouth 2 (two) times daily.     levothyroxine (SYNTHROID) 25 MCG tablet Take 25 mcg by mouth before breakfast.    LIDOcaine-prilocaine (EMLA) cream SMARTSIG:Sparingly Topical    metoprolol succinate (TOPROL-XL) 100 MG 24 hr tablet Take 100 mg by mouth nightly.     telmisartan  (MICARDIS) 80 MG Tab Take 80 mg by mouth once daily.       Review of patient's allergies indicates:  No Known Allergies    Past Medical History:   Diagnosis Date    Colon cancer Oc t 2020    rectal     Depression     Diabetes mellitus     pre diabetic    Hypertension     Postoperative hypothyroidism 10/06/2020    Rectal cancer 10/06/2020    Rectal cancer     Renal disorder     kidney stone    Sleep apnea     c pap machine in use    Thyroid disease     Tumor of lung      Past Surgical History:   Procedure Laterality Date    ABDOMINOPERINEAL RESECTION OF RECTUM N/A 8/16/2021    Procedure: PROCTECTOMY, ABDOMINOPERINEAL;  Surgeon: RAHUL Jacobson MD;  [facility];  Service: Colon and Rectal;  Laterality: N/A;  ERAS high    COLONOSCOPY N/A 9/9/2022    Procedure: COLONOSCOPY through stoma;  Surgeon: RAHUL Jacobson MD;  Location: [facility] (4TH FLR);  Service: Endoscopy;  Laterality: N/A;  fully vacc to bring card  inst emailed and via portal  clear liquids 4hr prior-AM Prep-LW    CREATION OF MUSCLE ROTATIONAL FLAP  8/16/2021    Procedure: CREATION, FLAP, MUSCLE ROTATION;  Surgeon: RAHUL Jacobson MD;  [facility];  Service: Colon and Rectal;;    CREATION OF MUSCLE ROTATIONAL FLAP N/A 8/16/2021    Procedure: CREATION, FLAP, MUSCLE ROTATION;  Surgeon: Nicholas Saez MD;  Location: [facility] OR [address] [place] [address];  Service: Plastics;  Laterality: N/A;  Vertical rectus abdominis flap    ESOPHAGOGASTRODUODENOSCOPY      EXAMINATION UNDER ANESTHESIA N/A 10/12/2020    Procedure: Exam under anesthesia, flex sig;  Surgeon: Blake Jacobson MD;  Location: [address];  Service: Endoscopy;  Laterality: N/A;  Rectal and vaginal areas    FISTULA REPAIR      anal fistula    FLAP PROCEDURE N/A 8/16/2021    Procedure: Fascia flap creation;  Surgeon: Nicholas Saez MD;  Location: [address];  Service: Plastics;  Laterality: N/A;    FLEXIBLE SIGMOIDOSCOPY  10/12/2020    Procedure:  SIGMOIDOSCOPY, FLEXIBLE;  Surgeon: Blake Jacobson MD;  [facility];  Service: Endoscopy;;    FUSION OF CERVICAL SPINE BY POSTERIOR APPROACH      anterior and posterior    RADIOFREQUENCY ABLATION OF LIVER LESION  4/6/2021    Procedure: RADIOFREQUENCY ABLATION, LESION, LIVER Robotic of segment 8 ;  Surgeon: Luciano Castaneda MD;  [facility];  Service: General;;    REVISION OF COLOSTOMY WITH REPAIR OF PARACOLOSTOMY HERNIA N/A 8/16/2021    Procedure: REVISION, COLOSTOMY, WITH PARACOLOSTOMY HERNIA REPAIR;  Surgeon: RAHUL Jacobson MD;  [facility];  Service: Colon and Rectal;  Laterality: N/A;    ROBOT-ASSISTED LAPAROSCOPIC PARTIAL SURGICAL REMOVAL OF LIVER USING DA MARBELLA XI N/A 4/6/2021    Procedure: XI ROBOTIC RESECTION, LIVER - RIGHT POSTERIOR - NEED DROP IN BK PROBE;  Surgeon: Luciano Castaneda MD;  [facility];  Service: General;  Laterality: N/A;    TOTAL ABDOMINAL HYSTERECTOMY W/ BILATERAL SALPINGOOPHORECTOMY N/A 8/16/2021    Procedure: HYSTERECTOMY, TOTAL, ABDOMINAL, WITH BILATERAL SALPINGO-OOPHORECTOMY;  Surgeon: Alejo Mccullough MD;  [facility];  Service: OB/GYN;  Laterality: N/A;    VAGINA RECONSTRUCTION SURGERY N/A 8/16/2021    Procedure: RECONSTRUCTION, VAGINA;  Surgeon: Nicholas Saez MD;  [facility];  Service: Plastics;  Laterality: N/A;    VAGINECTOMY N/A 8/16/2021    Procedure: VAGINECTOMY;  Surgeon: RAHUL Jacobson MD;  [facility];  Service: Colon and Rectal;  Laterality: N/A;  Posterior     Family History       Problem Relation (Age of Onset)    Breast cancer Mother (44), Maternal Grandmother    Cancer Mother, Paternal Grandmother    Heart disease Father    Hyperlipidemia Father    Prostate cancer Father          Tobacco Use    Smoking status: Never    Smokeless tobacco: Never   Substance and Sexual Activity    Alcohol use: Never    Drug use: Never    Sexual activity: Not  Currently     Constitutional: No Weight Change, No Fever, No Chills, No Night Sweats, No Fatigue, No Malaise    ENT/Mouth: No Hearing Changes, No Ear Pain, No Nasal Congestion, No Sinus Pain, No Hoarseness, No sore throat, No Rhinorrhea, No Swallowing Difficulty    Eyes: No Eye Pain, No Swelling, No Redness, No Foreign Body, No Discharge, No Vision Changes    Cardiovascular: No Chest Pain, No SOB, No PND, No Dyspnea on Exertion, No Orthopnea, No Claudication, No Edema, No Palpitations    Respiratory: No Cough, No Sputum, No Wheezing, No Smoke Exposure, No Dyspnea    Gastrointestinal: No Nausea, No Vomiting, No Diarrhea, No Constipation, No Pain, No Heartburn, No Anorexia, No Dysphagia, +Hematochezia, No Melena, No Flatulence, No Jaundice  Genitourinary: No Dysmenorrhea, No DUB, No Dyspareunia, No Dysuria, No Urinary Frequency, No Hematuria, No Urinary Incontinence, No Urgency, No Flank Pain, No Urinary Flow Changes, No Hesitancy    Musculoskeletal: No Arthralgias, No Myalgias, No Joint Swelling, No Joint Stiffness, No Back Pain, No Neck Pain, No Injury History    Skin: No Skin Lesions, No Pruritis, No Hair Changes, No Breast/Skin Changes, No Nipple Discharge    Neuro: No Weakness, No Numbness, No Paresthesias, No Loss of Consciousness, No Syncope, No Dizziness, No Headache, No Coordination Changes, No Recent Falls    Psych: No Anxiety/Panic, No Depression, No Insomnia, No Personality Changes, No Delusions, No Rumination, No SI/HI/AH/VH, No Social Issues, No Memory Changes, No Violence/Abuse Hx.,   Heme/Lymph: No Bruising, No Bleeding, No Transfusions History, No Lymphadenopathy    Endocrine: No Polyuria, No Polydipsia, No Temperature Intolerance     Objective:     Vital Signs (Most Recent):  Temp: 99.6 °F (37.6 °C) (10/02/24 2130)  Pulse: 89 (10/02/24 2320)  Resp: 16 (10/02/24 2320)  BP: 132/60 (10/02/24 2320)  SpO2: 100 % (10/02/24 2320) Vital Signs (24h Range):  Temp:  [99.6 °F (37.6 °C)] 99.6 °F (37.6 °C)  Pulse:  " [89-91] 89  Resp:  [16-18] 16  SpO2:  [98 %-100 %] 100 %  BP: (126-142)/(56-64) 132/60     Weight: 105.7 kg (233 lb)  Body mass index is 39.99 kg/m².    Gen: WD/WN in NAD, pale appearing  HEENT: MMM, Sclera anicteric   Chest: Normocardic, normotensive, bilateral chest rise  Abd: Soft, nontender, nondistended, stoma appliance removed and a small arterial bleed was noted immediately at the mucocutaneous junction, controlled with pressure.   Ext: No pitting edema noted     Significant Labs:  BMP (Last 3 Results): No results for input(s): "GLU", "NA", "K", "CL", "CO2", "BUN", "CREATININE", "CALCIUM", "MG" in the last 168 hours.  CBC (Last 3 Results): No results for input(s): "WBC", "RBC", "HGB", "HCT", "PLT", "MCV", "MCH", "MCHC" in the last 168 hours.  CRP (Last 3 Results): No results for input(s): "CRP" in the last 168 hours.    Assessment/Plan:       59 F with an end ileostomy after an APR, now with bleeding from a tiny punctate skin arteriole at the mucocutaneous junction    -Bleeding controlled with pressure and silver nitrate chemical cauterization  -Stool noted emanating from the stoma without any signs of blood  -Mucocutaneous junction completely hemostatic after cauterization of the arteriole, prophylactic cauterization of granulation tissue at the periphery was completed additionally  -Hgb 4.8, will transfuse 2 units and recheck cbc in am   -admit to CRS  -Reg diet      Thank you for your consult. I will sign off. Please contact us if you have any additional questions.    Luis Camejo MD  Colorectal Surgery  [facility]      "

## 2024-10-03 NOTE — ASSESSMENT & PLAN NOTE
Acute bleed from varix on stoma and granulation tissue, cauterized at bedside.  - received 2u prbc, Hgb response appropriate but still low, ordered a 3rd unit with a post transfusion cbc  - ok for diet and home medications  - monitor stoma for rebleed, could DC later today if remains stable and labs appropriate

## 2024-10-04 VITALS
HEIGHT: 64 IN | HEART RATE: 71 BPM | RESPIRATION RATE: 18 BRPM | WEIGHT: 233 LBS | OXYGEN SATURATION: 98 % | SYSTOLIC BLOOD PRESSURE: 117 MMHG | DIASTOLIC BLOOD PRESSURE: 75 MMHG | BODY MASS INDEX: 39.78 KG/M2 | TEMPERATURE: 98 F

## 2024-10-04 LAB
ANION GAP SERPL CALC-SCNC: 7 MMOL/L (ref 8–16)
ANISOCYTOSIS BLD QL SMEAR: ABNORMAL
BASO STIPL BLD QL SMEAR: ABNORMAL
BASOPHILS # BLD AUTO: ABNORMAL K/UL (ref 0–0.2)
BASOPHILS NFR BLD: 1 % (ref 0–1.9)
BUN SERPL-MCNC: 9 MG/DL (ref 6–20)
CALCIUM SERPL-MCNC: 8.3 MG/DL (ref 8.7–10.5)
CHLORIDE SERPL-SCNC: 112 MMOL/L (ref 95–110)
CO2 SERPL-SCNC: 22 MMOL/L (ref 23–29)
CREAT SERPL-MCNC: 0.8 MG/DL (ref 0.5–1.4)
DACRYOCYTES BLD QL SMEAR: ABNORMAL
DIFFERENTIAL METHOD BLD: ABNORMAL
EOSINOPHIL # BLD AUTO: ABNORMAL K/UL (ref 0–0.5)
EOSINOPHIL NFR BLD: 3 % (ref 0–8)
ERYTHROCYTE [DISTWIDTH] IN BLOOD BY AUTOMATED COUNT: 21 % (ref 11.5–14.5)
EST. GFR  (NO RACE VARIABLE): >60 ML/MIN/1.73 M^2
GLUCOSE SERPL-MCNC: 140 MG/DL (ref 70–110)
HCT VFR BLD AUTO: 26.3 % (ref 37–48.5)
HGB BLD-MCNC: 7.4 G/DL (ref 12–16)
HYPOCHROMIA BLD QL SMEAR: ABNORMAL
IMM GRANULOCYTES # BLD AUTO: ABNORMAL K/UL (ref 0–0.04)
IMM GRANULOCYTES NFR BLD AUTO: ABNORMAL % (ref 0–0.5)
LYMPHOCYTES # BLD AUTO: ABNORMAL K/UL (ref 1–4.8)
LYMPHOCYTES NFR BLD: 14 % (ref 18–48)
MCH RBC QN AUTO: 25.1 PG (ref 27–31)
MCHC RBC AUTO-ENTMCNC: 28.1 G/DL (ref 32–36)
MCV RBC AUTO: 89 FL (ref 82–98)
METAMYELOCYTES NFR BLD MANUAL: 1 %
MONOCYTES # BLD AUTO: ABNORMAL K/UL (ref 0.3–1)
MONOCYTES NFR BLD: 8 % (ref 4–15)
MYELOCYTES NFR BLD MANUAL: 2 %
NEUTROPHILS NFR BLD: 66 % (ref 38–73)
NEUTS BAND NFR BLD MANUAL: 5 %
NRBC BLD-RTO: 1 /100 WBC
OVALOCYTES BLD QL SMEAR: ABNORMAL
PLATELET # BLD AUTO: 75 K/UL (ref 150–450)
PLATELET BLD QL SMEAR: ABNORMAL
PMV BLD AUTO: 9.2 FL (ref 9.2–12.9)
POIKILOCYTOSIS BLD QL SMEAR: SLIGHT
POLYCHROMASIA BLD QL SMEAR: ABNORMAL
POTASSIUM SERPL-SCNC: 4.2 MMOL/L (ref 3.5–5.1)
RBC # BLD AUTO: 2.95 M/UL (ref 4–5.4)
SODIUM SERPL-SCNC: 141 MMOL/L (ref 136–145)
SPHEROCYTES BLD QL SMEAR: ABNORMAL
WBC # BLD AUTO: 5.49 K/UL (ref 3.9–12.7)

## 2024-10-04 PROCEDURE — 85007 BL SMEAR W/DIFF WBC COUNT: CPT | Mod: NCS | Performed by: STUDENT IN AN ORGANIZED HEALTH CARE EDUCATION/TRAINING PROGRAM

## 2024-10-04 PROCEDURE — G0378 HOSPITAL OBSERVATION PER HR: HCPCS

## 2024-10-04 PROCEDURE — 36415 COLL VENOUS BLD VENIPUNCTURE: CPT | Performed by: STUDENT IN AN ORGANIZED HEALTH CARE EDUCATION/TRAINING PROGRAM

## 2024-10-04 PROCEDURE — 25000003 PHARM REV CODE 250: Performed by: STUDENT IN AN ORGANIZED HEALTH CARE EDUCATION/TRAINING PROGRAM

## 2024-10-04 PROCEDURE — 85027 COMPLETE CBC AUTOMATED: CPT | Performed by: STUDENT IN AN ORGANIZED HEALTH CARE EDUCATION/TRAINING PROGRAM

## 2024-10-04 PROCEDURE — 80048 BASIC METABOLIC PNL TOTAL CA: CPT | Performed by: STUDENT IN AN ORGANIZED HEALTH CARE EDUCATION/TRAINING PROGRAM

## 2024-10-04 RX ADMIN — ACYCLOVIR 400 MG: 200 CAPSULE ORAL at 08:10

## 2024-10-04 RX ADMIN — AMLODIPINE BESYLATE 5 MG: 5 TABLET ORAL at 08:10

## 2024-10-04 RX ADMIN — GABAPENTIN 100 MG: 100 CAPSULE ORAL at 08:10

## 2024-10-04 RX ADMIN — LEVOTHYROXINE SODIUM 25 MCG: 25 TABLET ORAL at 05:10

## 2024-10-04 RX ADMIN — LOSARTAN POTASSIUM 100 MG: 50 TABLET, FILM COATED ORAL at 08:10

## 2024-10-04 NOTE — NURSING
Patient discharged per order with discharge instructions reviewed with patient and patients , both voiced understanding. Patient AAOx4 at time of discharge, ambulating with steady coordinated gait at time of exit with no acute distress noted.

## 2024-10-04 NOTE — DISCHARGE SUMMARY
[facility]  Colorectal Surgery  Discharge Summary      Patient Name: Meron Lamar  MRN: [id number]  Admission Date: 10/2/2024  Hospital Length of Stay: 0 days  Discharge Date and Time:  10/04/2024 11:25 AM  Attending Physician: RAHUL Jacobson MD   Discharging Provider: Frida Dent NP  Primary Care Provider: Qasim Boston MD     HPI:  59 F hx APR and end colostomy who noted BRB in her stoma bag this am. She changed her appliance and noticed blood was coming from the edge of the stoma/skin. She initially held pressure, which seemed to control it, but then later after she replaced the appliance she began to note blood within the bag throughout the day. She presented to the ED for evaluation.     * No surgery found *     Hospital Course:  10/2 admit to crs for obs for bleeding stoma Hgb 4.8  10/3 normalized, post transfusion CBC pending. Bleeding controlled with pressure and silver nitrate chemical cauterization   She received a total of 3 units while admitted.   Hgb 7.4 10/4/2024  On day of discharge pt is rachel regular diet, inc line healing well, positive for bm with flatus, ambulating in [place] without difficulty, VS stable and afebrile.         Goals of Care Treatment Preferences:  Code Status: Full Code      Consults (From admission, onward)          Status Ordering Provider     Inpatient consult to Colorectal Surgery  Once        Provider:  (Not yet assigned)    Completed MILTON GONZALEZ            Significant Diagnostic Studies: Labs: CMP   Recent Labs   Lab 10/02/24  2320 10/03/24  0752 10/04/24  0504    138 141   K 4.7 3.7 4.2    106 112*   CO2 19* 21* 22*   * 140* 140*   BUN 10 9 9   CREATININE 0.9 0.8 0.8   CALCIUM 7.9* 7.9* 8.3*   PROT 5.0*  --   --    ALBUMIN 2.7*  --   --    BILITOT 0.4  --   --    ALKPHOS 113  --   --    AST 51*  --   --    ALT 10  --   --    ANIONGAP 10 11 7*    and CBC   Recent Labs   Lab 10/03/24  0752 10/03/24  1510 10/04/24  0504    WBC 5.82 6.79 5.49   HGB 6.5* 8.0* 7.4*   HCT 22.7* 28.1* 26.3*   PLT 79* 92* 75*       Pending Diagnostic Studies:       None          Final Active Diagnoses:    Diagnosis Date Noted POA    PRINCIPAL PROBLEM:  Attention to colostomy [Z43.3] 02/02/2021 Not Applicable    Acute blood loss anemia [D62] 10/03/2024 Yes    Rectal cancer [C20] 08/16/2021 Yes    Type 2 diabetes mellitus without complication, without long-term current use of insulin [E11.9] 10/06/2020 Yes      Problems Resolved During this Admission:      Discharged Condition: good    Disposition: Home or Self Care    Follow Up:    Patient Instructions:      Notify your health care provider if you experience any of the following:  temperature >100.4     Notify your health care provider if you experience any of the following:  persistent nausea and vomiting or diarrhea     Notify your health care provider if you experience any of the following:  severe uncontrolled pain     Notify your health care provider if you experience any of the following:  redness, tenderness, or signs of infection (pain, swelling, redness, odor or green/yellow discharge around incision site)     Notify your health care provider if you experience any of the following:  severe persistent headache     Notify your health care provider if you experience any of the following:  difficulty breathing or increased cough     Notify your health care provider if you experience any of the following:  worsening rash     Notify your health care provider if you experience any of the following:  persistent dizziness, light-headedness, or visual disturbances     Notify your health care provider if you experience any of the following:  increased confusion or weakness     Activity as tolerated     Medications:  Reconciled Home Medications:      Medication List        CONTINUE taking these medications      acyclovir 400 MG tablet  Commonly known as: ZOVIRAX  Take 400 mg by mouth 2 (two) times daily.      amLODIPine 5 MG tablet  Commonly known as: NORVASC  Take 5 mg by mouth once daily.     EScitalopram oxalate 10 MG tablet  Commonly known as: LEXAPRO  Take 10 mg by mouth every evening.     gabapentin 100 MG capsule  Commonly known as: NEURONTIN  Take 100 mg by mouth 2 (two) times daily.     levothyroxine 25 MCG tablet  Commonly known as: SYNTHROID  Take 25 mcg by mouth before breakfast.     metoprolol succinate 100 MG 24 hr tablet  Commonly known as: TOPROL-XL  Take 100 mg by mouth nightly.     telmisartan 80 MG Tab  Commonly known as: MICARDIS  Take 80 mg by mouth once daily.     TYLENOL EXTRA STRENGTH 500 MG tablet  Generic drug: acetaminophen  Take 2 tablets by oral route.            STOP taking these medications      LIDOcaine-prilocaine cream  Commonly known as: KAILEY Dent NP  Colorectal Surgery  [facility]

## 2024-10-04 NOTE — PLAN OF CARE
[facility]  [facility]  Discharge Final Note    Primary Care Provider: Qasim Boston MD    Expected Discharge Date: 10/4/2024    Final Discharge Note (most recent)       Final Note - 10/04/24 1201          Final Note    Assessment Type Final Discharge Note     Anticipated Discharge Disposition Home or Self Care     Hospital Resources/Appts/Education Provided Provided patient/caregiver with written discharge plan information        Post-Acute Status    Patient choice form signed by patient/caregiver List with quality metrics by geographic area provided     Discharge Delays None known at this time                     Important Message from Medicare             Pt d/c home with family. No d/c needs reported by medical team at this time.     Eve Sahu LCSW  Case Management/[facility]  [phone]

## 2024-10-04 NOTE — ASSESSMENT & PLAN NOTE
Acute bleed from varix on stoma and granulation tissue, cauterized at bedside.  - received 2u prbc, Hgb response appropriate but still low, ordered a 3rd unit with a post transfusion cbc  - ok okay to dc home  No f/u needed

## 2024-10-04 NOTE — NURSING
"[facility] Plan of Care Note    Dx:   GI bleeding [K92.2]    Shift Events: no acute events overnight    Goals of Care: see careplan    Neuro: x4    Vital Signs: /70   Pulse 77   Temp 98.3 °F (36.8 °C) (Oral)   Resp 17   Ht 5' 4" (1.626 m)   Wt 105.7 kg (233 lb)   SpO2 95%   BMI 39.99 kg/m²     Respiratory: blscta    Diet: Diet Consistent Carbohydrate      Is patient tolerating current diet? yes    GTTS: none    Urine Output/Bowel Movement:   No intake/output data recorded.  Last Bowel Movement: 10/03/24      Drains/Tubes/Tube Feeds (include total output/shift):   No intake/output data recorded.      Lines: PIV x1. R chest tube      Accuchecks:none    Skin: ostomy     Fall Risk Score: 0    Activity level? independent    Any scheduled procedures? none    Any safety concerns? Fall risk    Other: none    "

## 2024-10-14 ENCOUNTER — HOSPITAL ENCOUNTER (OUTPATIENT)
Facility: HOSPITAL | Age: 59
Discharge: HOME | End: 2024-10-14
Attending: EMERGENCY MEDICINE | Admitting: STUDENT IN AN ORGANIZED HEALTH CARE EDUCATION/TRAINING PROGRAM
Payer: COMMERCIAL

## 2024-10-14 VITALS
BODY MASS INDEX: 39.78 KG/M2 | DIASTOLIC BLOOD PRESSURE: 70 MMHG | TEMPERATURE: 99 F | SYSTOLIC BLOOD PRESSURE: 138 MMHG | RESPIRATION RATE: 18 BRPM | OXYGEN SATURATION: 99 % | HEART RATE: 76 BPM | HEIGHT: 64 IN | WEIGHT: 233 LBS

## 2024-10-14 DIAGNOSIS — K92.9 DISORDER OF STOMA: ICD-10-CM

## 2024-10-14 DIAGNOSIS — R60.9 SWELLING: ICD-10-CM

## 2024-10-14 LAB
ABO + RH BLD: NORMAL
ALBUMIN SERPL BCP-MCNC: 2.8 G/DL (ref 3.5–5.2)
ALP SERPL-CCNC: 83 U/L (ref 55–135)
ALT SERPL W/O P-5'-P-CCNC: 8 U/L (ref 10–44)
ANION GAP SERPL CALC-SCNC: 12 MMOL/L (ref 8–16)
AST SERPL-CCNC: 18 U/L (ref 10–40)
BASOPHILS # BLD AUTO: 0.05 K/UL (ref 0–0.2)
BASOPHILS # BLD AUTO: 0.08 K/UL (ref 0–0.2)
BASOPHILS # BLD AUTO: 0.1 K/UL (ref 0–0.2)
BASOPHILS NFR BLD: 0.8 % (ref 0–1.9)
BASOPHILS NFR BLD: 0.8 % (ref 0–1.9)
BASOPHILS NFR BLD: 1.2 % (ref 0–1.9)
BILIRUB SERPL-MCNC: 0.4 MG/DL (ref 0.1–1)
BLD GP AB SCN CELLS X3 SERPL QL: NORMAL
BLD PROD TYP BPU: NORMAL
BLOOD UNIT EXPIRATION DATE: NORMAL
BLOOD UNIT TYPE CODE: 600
BLOOD UNIT TYPE: NORMAL
BUN SERPL-MCNC: 15 MG/DL (ref 6–20)
CALCIUM SERPL-MCNC: 8.5 MG/DL (ref 8.7–10.5)
CHLORIDE SERPL-SCNC: 112 MMOL/L (ref 95–110)
CO2 SERPL-SCNC: 17 MMOL/L (ref 23–29)
CODING SYSTEM: NORMAL
CREAT SERPL-MCNC: 1.1 MG/DL (ref 0.5–1.4)
CROSSMATCH INTERPRETATION: NORMAL
D DIMER PPP IA.FEU-MCNC: 1.12 MG/L FEU
DIFFERENTIAL METHOD BLD: ABNORMAL
DISPENSE STATUS: NORMAL
EOSINOPHIL # BLD AUTO: 0.1 K/UL (ref 0–0.5)
EOSINOPHIL # BLD AUTO: 0.2 K/UL (ref 0–0.5)
EOSINOPHIL # BLD AUTO: 0.2 K/UL (ref 0–0.5)
EOSINOPHIL NFR BLD: 1.7 % (ref 0–8)
EOSINOPHIL NFR BLD: 1.9 % (ref 0–8)
EOSINOPHIL NFR BLD: 2.3 % (ref 0–8)
ERYTHROCYTE [DISTWIDTH] IN BLOOD BY AUTOMATED COUNT: 19.5 % (ref 11.5–14.5)
ERYTHROCYTE [DISTWIDTH] IN BLOOD BY AUTOMATED COUNT: 21.2 % (ref 11.5–14.5)
ERYTHROCYTE [DISTWIDTH] IN BLOOD BY AUTOMATED COUNT: 21.4 % (ref 11.5–14.5)
EST. GFR  (NO RACE VARIABLE): 57.5 ML/MIN/1.73 M^2
GLUCOSE SERPL-MCNC: 159 MG/DL (ref 70–110)
HCT VFR BLD AUTO: 23.6 % (ref 37–48.5)
HCT VFR BLD AUTO: 26.7 % (ref 37–48.5)
HCT VFR BLD AUTO: 28.6 % (ref 37–48.5)
HGB BLD-MCNC: 6.7 G/DL (ref 12–16)
HGB BLD-MCNC: 7.4 G/DL (ref 12–16)
HGB BLD-MCNC: 7.9 G/DL (ref 12–16)
IMM GRANULOCYTES # BLD AUTO: 0.02 K/UL (ref 0–0.04)
IMM GRANULOCYTES # BLD AUTO: 0.13 K/UL (ref 0–0.04)
IMM GRANULOCYTES # BLD AUTO: 0.16 K/UL (ref 0–0.04)
IMM GRANULOCYTES NFR BLD AUTO: 0.3 % (ref 0–0.5)
IMM GRANULOCYTES NFR BLD AUTO: 1.3 % (ref 0–0.5)
IMM GRANULOCYTES NFR BLD AUTO: 2 % (ref 0–0.5)
LYMPHOCYTES # BLD AUTO: 0.7 K/UL (ref 1–4.8)
LYMPHOCYTES # BLD AUTO: 0.8 K/UL (ref 1–4.8)
LYMPHOCYTES # BLD AUTO: 1 K/UL (ref 1–4.8)
LYMPHOCYTES NFR BLD: 12.4 % (ref 18–48)
LYMPHOCYTES NFR BLD: 12.5 % (ref 18–48)
LYMPHOCYTES NFR BLD: 6.8 % (ref 18–48)
MCH RBC QN AUTO: 24.7 PG (ref 27–31)
MCH RBC QN AUTO: 25.8 PG (ref 27–31)
MCH RBC QN AUTO: 26.8 PG (ref 27–31)
MCHC RBC AUTO-ENTMCNC: 25.9 G/DL (ref 32–36)
MCHC RBC AUTO-ENTMCNC: 28.4 G/DL (ref 32–36)
MCHC RBC AUTO-ENTMCNC: 29.6 G/DL (ref 32–36)
MCV RBC AUTO: 91 FL (ref 82–98)
MCV RBC AUTO: 91 FL (ref 82–98)
MCV RBC AUTO: 96 FL (ref 82–98)
MONOCYTES # BLD AUTO: 0.5 K/UL (ref 0.3–1)
MONOCYTES # BLD AUTO: 0.7 K/UL (ref 0.3–1)
MONOCYTES # BLD AUTO: 0.8 K/UL (ref 0.3–1)
MONOCYTES NFR BLD: 7.7 % (ref 4–15)
MONOCYTES NFR BLD: 8 % (ref 4–15)
MONOCYTES NFR BLD: 8.5 % (ref 4–15)
NEUTROPHILS # BLD AUTO: 5.1 K/UL (ref 1.8–7.7)
NEUTROPHILS # BLD AUTO: 6.1 K/UL (ref 1.8–7.7)
NEUTROPHILS # BLD AUTO: 8.4 K/UL (ref 1.8–7.7)
NEUTROPHILS NFR BLD: 74.2 % (ref 38–73)
NEUTROPHILS NFR BLD: 76.4 % (ref 38–73)
NEUTROPHILS NFR BLD: 81.2 % (ref 38–73)
NRBC BLD-RTO: 0 /100 WBC
NUM UNITS TRANS PACKED RBC: NORMAL
OHS QRS DURATION: 84 MS
OHS QTC CALCULATION: 495 MS
PLATELET # BLD AUTO: 153 K/UL (ref 150–450)
PLATELET # BLD AUTO: 170 K/UL (ref 150–450)
PLATELET # BLD AUTO: 174 K/UL (ref 150–450)
PMV BLD AUTO: 10.6 FL (ref 9.2–12.9)
PMV BLD AUTO: 9.3 FL (ref 9.2–12.9)
PMV BLD AUTO: 9.7 FL (ref 9.2–12.9)
POTASSIUM SERPL-SCNC: 4.1 MMOL/L (ref 3.5–5.1)
PROT SERPL-MCNC: 5.2 G/DL (ref 6–8.4)
RBC # BLD AUTO: 2.6 M/UL (ref 4–5.4)
RBC # BLD AUTO: 2.95 M/UL (ref 4–5.4)
RBC # BLD AUTO: 2.99 M/UL (ref 4–5.4)
SODIUM SERPL-SCNC: 141 MMOL/L (ref 136–145)
SPECIMEN OUTDATE: NORMAL
WBC # BLD AUTO: 10.3 K/UL (ref 3.9–12.7)
WBC # BLD AUTO: 6.64 K/UL (ref 3.9–12.7)
WBC # BLD AUTO: 8.16 K/UL (ref 3.9–12.7)

## 2024-10-14 PROCEDURE — G0378 HOSPITAL OBSERVATION PER HR: HCPCS

## 2024-10-14 PROCEDURE — 86901 BLOOD TYPING SEROLOGIC RH(D): CPT

## 2024-10-14 PROCEDURE — 86850 RBC ANTIBODY SCREEN: CPT

## 2024-10-14 PROCEDURE — 85379 FIBRIN DEGRADATION QUANT: CPT

## 2024-10-14 PROCEDURE — P9016 RBC LEUKOCYTES REDUCED: HCPCS | Performed by: EMERGENCY MEDICINE

## 2024-10-14 PROCEDURE — 80053 COMPREHEN METABOLIC PANEL: CPT

## 2024-10-14 PROCEDURE — 99285 EMERGENCY DEPT VISIT HI MDM: CPT | Mod: 25

## 2024-10-14 PROCEDURE — 86920 COMPATIBILITY TEST SPIN: CPT | Performed by: EMERGENCY MEDICINE

## 2024-10-14 PROCEDURE — 99238 HOSP IP/OBS DSCHRG MGMT 30/<: CPT | Mod: ,,, | Performed by: NURSE PRACTITIONER

## 2024-10-14 PROCEDURE — 85025 COMPLETE CBC W/AUTO DIFF WBC: CPT | Mod: 91

## 2024-10-14 PROCEDURE — 63600175 PHARM REV CODE 636 W HCPCS

## 2024-10-14 PROCEDURE — 85025 COMPLETE CBC W/AUTO DIFF WBC: CPT

## 2024-10-14 PROCEDURE — 25000003 PHARM REV CODE 250: Performed by: STUDENT IN AN ORGANIZED HEALTH CARE EDUCATION/TRAINING PROGRAM

## 2024-10-14 PROCEDURE — 36430 TRANSFUSION BLD/BLD COMPNT: CPT

## 2024-10-14 RX ORDER — OXYCODONE HYDROCHLORIDE 5 MG/1
5 TABLET ORAL EVERY 6 HOURS PRN
Status: DISCONTINUED | OUTPATIENT
Start: 2024-10-14 | End: 2024-10-14 | Stop reason: HOSPADM

## 2024-10-14 RX ORDER — FAMOTIDINE 20 MG/1
20 TABLET, FILM COATED ORAL 2 TIMES DAILY
Status: DISCONTINUED | OUTPATIENT
Start: 2024-10-14 | End: 2024-10-14 | Stop reason: HOSPADM

## 2024-10-14 RX ORDER — LIDOCAINE HYDROCHLORIDE 10 MG/ML
1 INJECTION, SOLUTION EPIDURAL; INFILTRATION; INTRACAUDAL; PERINEURAL ONCE AS NEEDED
Status: DISCONTINUED | OUTPATIENT
Start: 2024-10-14 | End: 2024-10-14 | Stop reason: HOSPADM

## 2024-10-14 RX ORDER — ACYCLOVIR 200 MG/1
400 CAPSULE ORAL 2 TIMES DAILY
Status: DISCONTINUED | OUTPATIENT
Start: 2024-10-14 | End: 2024-10-14 | Stop reason: HOSPADM

## 2024-10-14 RX ORDER — SODIUM CHLORIDE 0.9 % (FLUSH) 0.9 %
10 SYRINGE (ML) INJECTION
Status: DISCONTINUED | OUTPATIENT
Start: 2024-10-14 | End: 2024-10-14 | Stop reason: HOSPADM

## 2024-10-14 RX ORDER — HYDROCODONE BITARTRATE AND ACETAMINOPHEN 500; 5 MG/1; MG/1
TABLET ORAL
Status: DISCONTINUED | OUTPATIENT
Start: 2024-10-14 | End: 2024-10-14 | Stop reason: HOSPADM

## 2024-10-14 RX ORDER — ONDANSETRON 8 MG/1
8 TABLET, ORALLY DISINTEGRATING ORAL EVERY 8 HOURS PRN
Status: DISCONTINUED | OUTPATIENT
Start: 2024-10-14 | End: 2024-10-14 | Stop reason: HOSPADM

## 2024-10-14 RX ORDER — LIDOCAINE HYDROCHLORIDE 10 MG/ML
5 INJECTION, SOLUTION EPIDURAL; INFILTRATION; INTRACAUDAL; PERINEURAL
Status: COMPLETED | OUTPATIENT
Start: 2024-10-14 | End: 2024-10-14

## 2024-10-14 RX ORDER — SILVER NITRATE 38.21; 12.74 MG/1; MG/1
5 STICK TOPICAL ONCE
Status: COMPLETED | OUTPATIENT
Start: 2024-10-14 | End: 2024-10-14

## 2024-10-14 RX ORDER — VALSARTAN 160 MG/1
320 TABLET ORAL DAILY
Status: DISCONTINUED | OUTPATIENT
Start: 2024-10-14 | End: 2024-10-14 | Stop reason: HOSPADM

## 2024-10-14 RX ORDER — ACETAMINOPHEN 500 MG
1000 TABLET ORAL EVERY 6 HOURS PRN
Status: DISCONTINUED | OUTPATIENT
Start: 2024-10-14 | End: 2024-10-14 | Stop reason: HOSPADM

## 2024-10-14 RX ORDER — SODIUM CHLORIDE, SODIUM LACTATE, POTASSIUM CHLORIDE, CALCIUM CHLORIDE 600; 310; 30; 20 MG/100ML; MG/100ML; MG/100ML; MG/100ML
INJECTION, SOLUTION INTRAVENOUS CONTINUOUS
Status: DISCONTINUED | OUTPATIENT
Start: 2024-10-14 | End: 2024-10-14

## 2024-10-14 RX ORDER — LEVOTHYROXINE SODIUM 25 UG/1
25 TABLET ORAL
Status: DISCONTINUED | OUTPATIENT
Start: 2024-10-14 | End: 2024-10-14 | Stop reason: HOSPADM

## 2024-10-14 RX ORDER — GABAPENTIN 100 MG/1
100 CAPSULE ORAL 2 TIMES DAILY
Status: DISCONTINUED | OUTPATIENT
Start: 2024-10-14 | End: 2024-10-14 | Stop reason: HOSPADM

## 2024-10-14 RX ORDER — ESCITALOPRAM OXALATE 5 MG/1
10 TABLET ORAL NIGHTLY
Status: DISCONTINUED | OUTPATIENT
Start: 2024-10-14 | End: 2024-10-14 | Stop reason: HOSPADM

## 2024-10-14 RX ORDER — METOPROLOL SUCCINATE 50 MG/1
100 TABLET, EXTENDED RELEASE ORAL NIGHTLY
Status: DISCONTINUED | OUTPATIENT
Start: 2024-10-14 | End: 2024-10-14 | Stop reason: HOSPADM

## 2024-10-14 RX ORDER — AMLODIPINE BESYLATE 5 MG/1
5 TABLET ORAL DAILY
Status: DISCONTINUED | OUTPATIENT
Start: 2024-10-14 | End: 2024-10-14 | Stop reason: HOSPADM

## 2024-10-14 RX ORDER — TALC
6 POWDER (GRAM) TOPICAL NIGHTLY PRN
Status: DISCONTINUED | OUTPATIENT
Start: 2024-10-14 | End: 2024-10-14 | Stop reason: HOSPADM

## 2024-10-14 RX ADMIN — SILVER NITRATE APPLICATORS 5 APPLICATOR: 25; 75 STICK TOPICAL at 11:10

## 2024-10-14 RX ADMIN — AMLODIPINE BESYLATE 5 MG: 5 TABLET ORAL at 11:10

## 2024-10-14 RX ADMIN — VALSARTAN 320 MG: 160 TABLET, FILM COATED ORAL at 11:10

## 2024-10-14 RX ADMIN — LEVOTHYROXINE SODIUM 25 MCG: 25 TABLET ORAL at 11:10

## 2024-10-14 RX ADMIN — FAMOTIDINE 20 MG: 20 TABLET ORAL at 11:10

## 2024-10-14 RX ADMIN — GABAPENTIN 100 MG: 100 CAPSULE ORAL at 11:10

## 2024-10-14 RX ADMIN — LIDOCAINE HYDROCHLORIDE 50 MG: 10 SOLUTION INTRAVENOUS at 02:10

## 2024-10-14 NOTE — ED PROVIDER NOTES
"Encounter Date: 10/14/2024       History     Chief Complaint   Patient presents with    GI Problem     Has colostomy bag, seen last week for bleeding stoma and had to get it cauterized. Started bleeding again today. Pt reports that she "blood was shooting", bleeding controlled in triage. Hx CA, currently on chemo.     HPI    Patient is a 59-year-old female with a history of diabetes, hypertension, rectal cancer status post colostomy presenting to the ED due to bleeding from stoma.  Patient reports that since 02/30 this afternoon, she was had to empty her bag 4 times due to blood clots.  On her way to the emergency department, she notes that she had to change her bag.  When she removed the bed, noticed blood shooting from area of stoma.     Reports some shortness of breath when walking in.  Weakness at baseline due to her chemotherapy.  Last chemo approximately 3 weeks ago.    Of note, patient was recently admitted for similar issue on 10/02. At this time, she had significant hemoglobin drop to 4.8 and required 3 units of PRBCs.  She was admitted for observation and able to be discharged home.    Review of patient's allergies indicates:  No Known Allergies  Past Medical History:   Diagnosis Date    Colon cancer Oc t 2020    rectal     Depression     Diabetes mellitus     pre diabetic    Hypertension     Postoperative hypothyroidism 10/06/2020    Rectal cancer 10/06/2020    Rectal cancer     Renal disorder     kidney stone    Sleep apnea     c pap machine in use    Thyroid disease     Tumor of lung      Past Surgical History:   Procedure Laterality Date    ABDOMINOPERINEAL RESECTION OF RECTUM N/A 8/16/2021    Procedure: PROCTECTOMY, ABDOMINOPERINEAL;  Surgeon: RAHUL Jacobson MD;  Location: [facility] OR 2ND FLR;  Service: Colon and Rectal;  Laterality: N/A;  ERAS high    COLONOSCOPY N/A 9/9/2022    Procedure: COLONOSCOPY through stoma;  Surgeon: RAHUL Jacobson MD;  Location: [facility] (4TH FLR);  Service: " Endoscopy;  Laterality: N/A;  fully vacc to bring card  inst emailed and via portal  clear liquids 4hr prior-AM Prep-LW    CREATION OF MUSCLE ROTATIONAL FLAP  8/16/2021    Procedure: CREATION, FLAP, MUSCLE ROTATION;  Surgeon: RAHUL Jacobson MD;  Location: [facility] OR 2ND FLR;  Service: Colon and Rectal;;    CREATION OF MUSCLE ROTATIONAL FLAP N/A 8/16/2021    Procedure: CREATION, FLAP, MUSCLE ROTATION;  Surgeon: Nicholas Saez MD;  Location: [facility] OR [facility] FLR;  Service: Plastics;  Laterality: N/A;  Vertical rectus abdominis flap    ESOPHAGOGASTRODUODENOSCOPY      EXAMINATION UNDER ANESTHESIA N/A 10/12/2020    Procedure: Exam under anesthesia, flex sig;  Surgeon: Blake Jacobson MD;  [facility];  Service: Endoscopy;  Laterality: N/A;  Rectal and vaginal areas    FISTULA REPAIR      anal fistula    FLAP PROCEDURE N/A 8/16/2021    Procedure: Fascia flap creation;  Surgeon: Nicholas Saez MD;  [facility];  Service: Plastics;  Laterality: N/A;    FLEXIBLE SIGMOIDOSCOPY  10/12/2020    Procedure: SIGMOIDOSCOPY, FLEXIBLE;  Surgeon: Blake Jacobson MD;  [facility];  Service: Endoscopy;;    FUSION OF CERVICAL SPINE BY POSTERIOR APPROACH      anterior and posterior    RADIOFREQUENCY ABLATION OF LIVER LESION  4/6/2021    Procedure: RADIOFREQUENCY ABLATION, LESION, LIVER Robotic of segment 8 ;  Surgeon: Luciano Castaneda MD;  [facility];  Service: General;;    REVISION OF COLOSTOMY WITH REPAIR OF PARACOLOSTOMY HERNIA N/A 8/16/2021    Procedure: REVISION, COLOSTOMY, WITH PARACOLOSTOMY HERNIA REPAIR;  Surgeon: RAHUL Jacobson MD;  [facility];  Service: Colon and Rectal;  Laterality: N/A;    ROBOT-ASSISTED LAPAROSCOPIC PARTIAL SURGICAL REMOVAL OF LIVER USING DA MARBELLA XI N/A 4/6/2021    Procedure: XI ROBOTIC RESECTION, LIVER - RIGHT POSTERIOR - NEED DROP IN BK PROBE;  Surgeon: Luciano Castaneda MD;  [facility];   Service: General;  Laterality: N/A;    TOTAL ABDOMINAL HYSTERECTOMY W/ BILATERAL SALPINGOOPHORECTOMY N/A 8/16/2021    Procedure: HYSTERECTOMY, TOTAL, ABDOMINAL, WITH BILATERAL SALPINGO-OOPHORECTOMY;  Surgeon: Alejo Mccullough MD;  Location: [address];  Service: OB/GYN;  Laterality: N/A;    VAGINA RECONSTRUCTION SURGERY N/A 8/16/2021    Procedure: RECONSTRUCTION, VAGINA;  Surgeon: Nicholas Saez MD;  Location: [facility] OR [address] [place] [address];  Service: Plastics;  Laterality: N/A;    VAGINECTOMY N/A 8/16/2021    Procedure: VAGINECTOMY;  Surgeon: RAHUL Jacobson MD;  Location: [facility] OR [address] [place] [address];  Service: Colon and Rectal;  Laterality: N/A;  Posterior     Family History   Problem Relation Name Age of Onset    Breast cancer Mother  44    Cancer Mother      Heart disease Father      Hyperlipidemia Father      Prostate cancer Father      Breast cancer Maternal Grandmother      Cancer Paternal Grandmother          ? female origin    Ovarian cancer Neg Hx      Uterine cancer Neg Hx      Colon cancer Neg Hx       Social History     Tobacco Use    Smoking status: Never    Smokeless tobacco: Never   Substance Use Topics    Alcohol use: Never    Drug use: Never     Physical Exam     Initial Vitals [10/14/24 0004]   BP Pulse Resp Temp SpO2   131/60 84 18 98.4 °F (36.9 °C) 97 %      MAP       --         Physical Exam    Constitutional: She appears well-developed and well-nourished. She is not diaphoretic. No distress.   HENT:   Head: Normocephalic and atraumatic.   Eyes:   Conjunctival pallor   Cardiovascular:  Normal rate and regular rhythm.           Pulmonary/Chest: Breath sounds normal. No respiratory distress.   Abdominal: Abdomen is soft. She exhibits no distension. There is no abdominal tenderness.   Colostomy bag in place, no blood visualized in bag     Neurological: She is alert.         ED Course   Procedures  Labs Reviewed   CBC W/ AUTO DIFFERENTIAL - Abnormal       Result Value    WBC 8.16      RBC 2.99 (*)      Hemoglobin 7.4 (*)     Hematocrit 28.6 (*)     MCV 96      MCH 24.7 (*)     MCHC 25.9 (*)     RDW 21.4 (*)     Platelets 174      MPV 9.3      Immature Granulocytes 2.0 (*)     Gran # (ANC) 6.1      Immature Grans (Abs) 0.16 (*)     Lymph # 1.0      Mono # 0.7      Eos # 0.1      Baso # 0.10      nRBC 0      Gran % 74.2 (*)     Lymph % 12.4 (*)     Mono % 8.5      Eosinophil % 1.7      Basophil % 1.2      Differential Method Automated     COMPREHENSIVE METABOLIC PANEL - Abnormal    Sodium 141      Potassium 4.1      Chloride 112 (*)     CO2 17 (*)     Glucose 159 (*)     BUN 15      Creatinine 1.1      Calcium 8.5 (*)     Total Protein 5.2 (*)     Albumin 2.8 (*)     Total Bilirubin 0.4      Alkaline Phosphatase 83      AST 18      ALT 8 (*)     eGFR 57.5 (*)     Anion Gap 12     CBC W/ AUTO DIFFERENTIAL - Abnormal    WBC 6.64      RBC 2.60 (*)     Hemoglobin 6.7 (*)     Hematocrit 23.6 (*)     MCV 91      MCH 25.8 (*)     MCHC 28.4 (*)     RDW 21.2 (*)     Platelets 153      MPV 10.6      Immature Granulocytes 0.3      Gran # (ANC) 5.1      Immature Grans (Abs) 0.02      Lymph # 0.8 (*)     Mono # 0.5      Eos # 0.2      Baso # 0.05      nRBC 0      Gran % 76.4 (*)     Lymph % 12.5 (*)     Mono % 7.7      Eosinophil % 2.3      Basophil % 0.8      Differential Method Automated     D DIMER, QUANTITATIVE   TYPE & SCREEN   PREPARE RBC SOFT     EKG Readings: (Independently Interpreted)   Initial Reading: No STEMI. Rhythm: Normal Sinus Rhythm. Heart Rate: 76. Ectopy: No Ectopy. Conduction: Normal. T Waves Flipped: AVR, AVL and V1. Axis: Normal. Clinical Impression: Normal Sinus Rhythm       Imaging Results    None          Medications   0.9%  NaCl infusion (for blood administration) (has no administration in time range)   acetaminophen tablet 1,000 mg (has no administration in time range)   EScitalopram oxalate tablet 10 mg (has no administration in time range)   amLODIPine tablet 5 mg (has no administration in time  range)   acyclovir capsule 400 mg (has no administration in time range)   gabapentin capsule 100 mg (has no administration in time range)   levothyroxine tablet 25 mcg (has no administration in time range)   metoprolol succinate (TOPROL-XL) 24 hr tablet 100 mg (has no administration in time range)   valsartan tablet 320 mg (has no administration in time range)   LIDOcaine (PF) 10 mg/ml (1%) injection 10 mg (has no administration in time range)   sodium chloride 0.9% flush 10 mL (has no administration in time range)   ondansetron disintegrating tablet 8 mg (has no administration in time range)   melatonin tablet 6 mg (has no administration in time range)   oxyCODONE immediate release tablet 5 mg (has no administration in time range)   lactated ringers infusion (has no administration in time range)   famotidine tablet 20 mg (has no administration in time range)   LIDOcaine (PF) 10 mg/ml (1%) injection 50 mg (50 mg Infiltration Given 10/14/24 4745)     Medical Decision Making    Patient is a 59-year-old female with a history of diabetes, hypertension, rectal cancer status post colostomy presenting to the ED due to bleeding from stoma.  Vital signs are normal, but she does report emptying her bad multiple times per UTI and having had blood shooting out air to placing her back just prior to arrival.    CBC and CMP ordered, initial hemoglobin was 7.4.     CRS was consulted.  Pulled the ostomy bag off was QRS bedside.  No active bleeding at this time other than small oozing.  Colorectal surgery placed in absorbable suture at the site of bleeding.  Recommending repeat hemoglobin 4 hours after initial to ensure no additional drop.    Patient's hemoglobin dropped to 6.7.  1 unit PRBC ordered and colorectal surgery admitted for observation.    Of note, patient reporting worsening left lower extremity swelling compared to normal.  DVT ultrasound was ordered in addition to D-dimer.  If DVT ultrasound is negative but D-dimer is  elevated, patient will need follow up DVT study in the future to ensure no proximal development.                                    Clinical Impression:  Final diagnoses:  [R60.9] Swelling          ED Disposition Condition    Observation                 Ivonne Orellana DO  Resident  10/14/24 6726

## 2024-10-14 NOTE — ASSESSMENT & PLAN NOTE
58yo F with recurrent stoma bleed. HD stable, no pulsatile flow or ulceration on exam. Placed a small vicryl suture at the punctate area of bleed with lidocaine - risks and benefits discussed with patient at bedside and agreed prior to suture.   - Repeat CBC in about 4-6 hours to assess for down drift after acute bleed. If remains asymptomatic and hemostatic with stable Hgb, safe to DC home.    Discussed with Dr. Mckeon.

## 2024-10-14 NOTE — Clinical Note
Diagnosis: Swelling [147824]   Future Attending Provider: BABITA AUSTIN [[id number]]   Is the patient being sent to ED Observation?: Yes   Special Needs:: No Special Needs [1]

## 2024-10-14 NOTE — CONSULTS
[facility] - Emergency Dept  Colorectal Surgery  Consult Note    Patient Name: Meron Laamr  MRN: [id number]  Admission Date: 10/14/2024  Hospital Length of Stay: 0 days  Attending Physician: Duke Bautista MD  Primary Care Provider: Qasim Boston MD    Inpatient consult to Colorectal Surgery  Consult performed by: Jordyn Adams MD  Consult ordered by: Ivonne Orellana DO        Subjective:     Chief Complaint/Reason for Admission: stoma bleed    History of Present Illness:  59 F presents for bleeding at her stoma. Recently admitted 10/4/24 for bleeding, controlled with cautery and was transfused 3u. She noted BRB in her stoma bag today, with blood clots noticeable in multiple bags.  She stopped at a gas station coming here to change the ostomy appliance and noticed spurting blood from the superior portion, same area as before. She changed her appliance and noticed blood was coming from the edge of the stoma/skin. She initially held pressure, which seemed to control it, but was concerned for continued bleed. She presented to the ED for evaluation. She denies symptoms of acute blood loss. No blood in the bag seen by triage in the ED.    Hx of locally advanced rectal cancer with multiple hepatic metastases [id number], s/p APR and end colostomy 8/16/21 and right posterior liver resection segment 6/7 and ablation of liver lesion in segment 8 4/6/21 with positive margins, pulmonary mets treated with SBRT and maintained on adjuvant chemotherapy.       Review of patient's allergies indicates:  No Known Allergies    Past Medical History:   Diagnosis Date    Colon cancer Oc t 2020    rectal     Depression     Diabetes mellitus     pre diabetic    Hypertension     Postoperative hypothyroidism 10/06/2020    Rectal cancer 10/06/2020    Rectal cancer     Renal disorder     kidney stone    Sleep apnea     c pap machine in use    Thyroid disease     Tumor of lung      Past Surgical History:   Procedure Laterality Date     ABDOMINOPERINEAL RESECTION OF RECTUM N/A 8/16/2021    Procedure: PROCTECTOMY, ABDOMINOPERINEAL;  Surgeon: RAHUL Jacobson MD;  Location: [facility] OR 2ND FLR;  Service: Colon and Rectal;  Laterality: N/A;  ERAS high    COLONOSCOPY N/A 9/9/2022    Procedure: COLONOSCOPY through stoma;  Surgeon: RAHUL Jacobson MD;  Location: [facility] ENDO (4TH FLR);  Service: Endoscopy;  Laterality: N/A;  fully vacc to bring card  inst emailed and via portal  clear liquids 4hr prior-AM Prep-LW    CREATION OF MUSCLE ROTATIONAL FLAP  8/16/2021    Procedure: CREATION, FLAP, MUSCLE ROTATION;  Surgeon: RAHUL Jacobson MD;  Location: [facility] OR 2ND FLR;  Service: Colon and Rectal;;    CREATION OF MUSCLE ROTATIONAL FLAP N/A 8/16/2021    Procedure: CREATION, FLAP, MUSCLE ROTATION;  Surgeon: Nicholas Saez MD;  [facility];  Service: Plastics;  Laterality: N/A;  Vertical rectus abdominis flap    ESOPHAGOGASTRODUODENOSCOPY      EXAMINATION UNDER ANESTHESIA N/A 10/12/2020    Procedure: Exam under anesthesia, flex sig;  Surgeon: Blake Jacobson MD;  [facility];  Service: Endoscopy;  Laterality: N/A;  Rectal and vaginal areas    FISTULA REPAIR      anal fistula    FLAP PROCEDURE N/A 8/16/2021    Procedure: Fascia flap creation;  Surgeon: Nicholas Saez MD;  [facility];  Service: Plastics;  Laterality: N/A;    FLEXIBLE SIGMOIDOSCOPY  10/12/2020    Procedure: SIGMOIDOSCOPY, FLEXIBLE;  Surgeon: Blake Jacobson MD;  [facility];  Service: Endoscopy;;    FUSION OF CERVICAL SPINE BY POSTERIOR APPROACH      anterior and posterior    RADIOFREQUENCY ABLATION OF LIVER LESION  4/6/2021    Procedure: RADIOFREQUENCY ABLATION, LESION, LIVER Robotic of segment 8 ;  Surgeon: Luciano Castaneda MD;  Location: [facility] OR 2ND FLR;  Service: General;;    REVISION OF COLOSTOMY WITH REPAIR OF PARACOLOSTOMY HERNIA N/A 8/16/2021    Procedure: REVISION, COLOSTOMY, WITH PARACOLOSTOMY  HERNIA REPAIR;  Surgeon: RAHUL Jacobson MD;  Location: [facility] OR 2ND FLR;  Service: Colon and Rectal;  Laterality: N/A;    ROBOT-ASSISTED LAPAROSCOPIC PARTIAL SURGICAL REMOVAL OF LIVER USING DA MARBELLA XI N/A 4/6/2021    Procedure: XI ROBOTIC RESECTION, LIVER - RIGHT POSTERIOR - NEED DROP IN BK PROBE;  Surgeon: Luciano Castaneda MD;  Location: [facility] OR 2ND FLR;  Service: General;  Laterality: N/A;    TOTAL ABDOMINAL HYSTERECTOMY W/ BILATERAL SALPINGOOPHORECTOMY N/A 8/16/2021    Procedure: HYSTERECTOMY, TOTAL, ABDOMINAL, WITH BILATERAL SALPINGO-OOPHORECTOMY;  Surgeon: Alejo Mccullough MD;  Location: [facility] OR 2ND FLR;  Service: OB/GYN;  Laterality: N/A;    VAGINA RECONSTRUCTION SURGERY N/A 8/16/2021    Procedure: RECONSTRUCTION, VAGINA;  Surgeon: Nicholas Saez MD;  Location: [facility] OR 2ND FLR;  Service: Plastics;  Laterality: N/A;    VAGINECTOMY N/A 8/16/2021    Procedure: VAGINECTOMY;  Surgeon: RAHUL Jacobson MD;  Location: [facility] OR 2ND FLR;  Service: Colon and Rectal;  Laterality: N/A;  Posterior     Family History       Problem Relation (Age of Onset)    Breast cancer Mother (44), Maternal Grandmother    Cancer Mother, Paternal Grandmother    Heart disease Father    Hyperlipidemia Father    Prostate cancer Father          Tobacco Use    Smoking status: Never    Smokeless tobacco: Never   Substance and Sexual Activity    Alcohol use: Never    Drug use: Never    Sexual activity: Not Currently     Review of Systems   All other systems reviewed and are negative.    Objective:     Vital Signs (Most Recent):  Temp: 98.4 °F (36.9 °C) (10/14/24 0004)  Pulse: 74 (10/14/24 0116)  Resp: 20 (10/14/24 0116)  BP: (!) 145/66 (10/14/24 0109)  SpO2: 97 % (10/14/24 0116) Vital Signs (24h Range):  Temp:  [98.4 °F (36.9 °C)] 98.4 °F (36.9 °C)  Pulse:  [74-84] 74  Resp:  [18-20] 20  SpO2:  [97 %] 97 %  BP: (131-145)/(60-66) 145/66     Weight: 105.7 kg (233 lb 0.4 oz)  Body mass index is 40 kg/m².     Physical  Exam  Constitutional:       General: She is not in acute distress.     Appearance: Normal appearance. She is obese. She is not ill-appearing or toxic-appearing.   HENT:      Head: Normocephalic.      Mouth/Throat:      Mouth: Mucous membranes are moist.      Pharynx: Oropharynx is clear.   Eyes:      Extraocular Movements: Extraocular movements intact.      Conjunctiva/sclera: Conjunctivae normal.   Cardiovascular:      Rate and Rhythm: Normal rate and regular rhythm.   Pulmonary:      Effort: Pulmonary effort is normal. No respiratory distress.   Abdominal:      General: Abdomen is flat. There is no distension.      Palpations: Abdomen is soft.      Tenderness: There is no abdominal tenderness. There is no guarding.      Comments: Well healed incisions. Parastomal hernia present. End colostomy pink with minuscule area of ooze at the superior junction of the skin and mucosa.   Skin:     General: Skin is warm and dry.      Coloration: Skin is pale (chronically pale, this is baseline).   Neurological:      General: No focal deficit present.      Mental Status: She is alert.   Psychiatric:         Mood and Affect: Mood normal.         Behavior: Behavior normal.         Thought Content: Thought content normal.         Judgment: Judgment normal.       Significant Labs:  Hgb 10/4/24 7.4  Hgb today 7.4    Significant Diagnostics:  None    Assessment/Plan:     Oncology  Acute blood loss anemia  58yo F with recurrent stoma bleed. HD stable, no pulsatile flow or ulceration on exam. Placed a small vicryl suture at the punctate area of bleed with lidocaine - risks and benefits discussed with patient at bedside and agreed prior to suture.   - Repeat CBC in about 4-6 hours to assess for down drift after acute bleed. If remains asymptomatic and hemostatic with stable Hgb, safe to DC home.    Discussed with Dr. Mckeon.    --> Repeat Hgb 6.7, receiving a unit. Concern for asymmetrical lower extremity swelling, has chronic pitting  edema. Venous US ordered by ED. Place in Obs NPO and home meds with a repeat CBC at 11am.    Thank you for your consult. I will follow-up with patient. Please contact us if you have any additional questions.    Jordyn Adams MD  Colorectal Surgery  [facility] - Emergency Dept

## 2024-10-14 NOTE — DISCHARGE INSTRUCTIONS
Post Observation Instructions:    You had a bleed from your colostomy border and an absorbable stitch was placed today. You received one unit of red blood cells and were safe and comfortable going home.     Wound care:  You have stitches in place that will absorb.  They may break down early - this is very common and it will continue to heal fine.  You have silver nitrate sticks - use the black end to gently press into the oozing area. The tissue will stain black and this is normal. You may have a burning sensation and this will resolve on its own, okay to use ice packs and tylenol.  Pouch your stoma as normal.    Medications:  Resume your home medications.    You have no activity restrictions.   No dietary restrictions. Keep well hydrated to have soft bowel movements.    Follow up:  Return to clinic as scheduled. Call [phone] for worsening pain or continued bleeding, or present to your nearest ED.

## 2024-10-14 NOTE — ASSESSMENT & PLAN NOTE
60yo F with recurrent stoma bleed. HD stable, no pulsatile flow or ulceration on exam. Will place a small absorbable suture at the punctate area of bleed with lidocaine. Risks and benefits discussed with patient at bedside and agreed. Repeat CBC in about 4 hours to assess for down drift after acute bleed. If remains asymptomatic and hemostatic with stable Hgb, safe to DC home.  Discussed with Dr. Mckeon.

## 2024-10-14 NOTE — ED TRIAGE NOTES
"Meron Lamar, a 59 y.o. female presents to the ED w/ complaint of bleeding from ostomy in large quantities. No current blood in colostomy on ER arrival    Triage note:  Chief Complaint   Patient presents with    GI Problem     Has colostomy bag, seen last week for bleeding stoma and had to get it cauterized. Started bleeding again today. Pt reports that she "blood was shooting", bleeding controlled in triage. Hx CA, currently on chemo.     Review of patient's allergies indicates:  No Known Allergies  Past Medical History:   Diagnosis Date    Colon cancer Oc t 2020    rectal     Depression     Diabetes mellitus     pre diabetic    Hypertension     Postoperative hypothyroidism 10/06/2020    Rectal cancer 10/06/2020    Rectal cancer     Renal disorder     kidney stone    Sleep apnea     c pap machine in use    Thyroid disease     Tumor of lung       "

## 2024-10-14 NOTE — ED NOTES
I assumed care of this patient at this time. Report received from PANDA Hayes. Pt is resting comfortably in ED stretcher. Pt is AAOx4. RR is even, unlabored, and spontaneous + pt's oxygen saturation is 95% on room air at this time. Skin is warm, dry and intact. Pt ambulatory + full ROM of all extremities. Pt independent + able to void spontaneously. Colostomy bag present + no bleeding in bag at this time. Pt denies pain or needs at this time. Pt remains on continuous beside monitoring. Bed low and locked; side rails up x2; call light within reach. Family member at bedside.

## 2024-10-14 NOTE — HPI
59 F presents for bleeding at her stoma. Recently admitted 10/4/24 for bleeding, controlled with cautery and was transfused 3u. She noted BRB in her stoma bag today, with blood clots noticeable in multiple bags.  She stopped at a gas station coming here to change the ostomy appliance and noticed spurting blood from the superior portion, same area as before. She changed her appliance and noticed blood was coming from the edge of the stoma/skin. She initially held pressure, which seemed to control it, but was concerned for continued bleed. She presented to the ED for evaluation. She denies symptoms of acute blood loss. No blood in the bag seen by triage in the ED.    Hx of locally advanced rectal cancer with multiple hepatic metastases [id number], s/p APR and end colostomy 8/16/21 and right posterior liver resection segment 6/7 and ablation of liver lesion in segment 8 4/6/21 with positive margins, pulmonary mets treated with SBRT and maintained on adjuvant chemotherapy.

## 2024-10-14 NOTE — SUBJECTIVE & OBJECTIVE
Review of patient's allergies indicates:  No Known Allergies    Past Medical History:   Diagnosis Date    Colon cancer Oc t 2020    rectal     Depression     Diabetes mellitus     pre diabetic    Hypertension     Postoperative hypothyroidism 10/06/2020    Rectal cancer 10/06/2020    Rectal cancer     Renal disorder     kidney stone    Sleep apnea     c pap machine in use    Thyroid disease     Tumor of lung      Past Surgical History:   Procedure Laterality Date    ABDOMINOPERINEAL RESECTION OF RECTUM N/A 8/16/2021    Procedure: PROCTECTOMY, ABDOMINOPERINEAL;  Surgeon: RAHUL Jacobson MD;  Location: [facility] OR [facility] FLR;  Service: Colon and Rectal;  Laterality: N/A;  ERAS high    COLONOSCOPY N/A 9/9/2022    Procedure: COLONOSCOPY through stoma;  Surgeon: RAHUL Jacobson MD;  Location: [facility] ENDO (4TH FLR);  Service: Endoscopy;  Laterality: N/A;  fully vacc to bring card  inst emailed and via portal  clear liquids 4hr prior-AM Prep-LW    CREATION OF MUSCLE ROTATIONAL FLAP  8/16/2021    Procedure: CREATION, FLAP, MUSCLE ROTATION;  Surgeon: RAHUL Jacobson MD;  [facility];  Service: Colon and Rectal;;    CREATION OF MUSCLE ROTATIONAL FLAP N/A 8/16/2021    Procedure: CREATION, FLAP, MUSCLE ROTATION;  Surgeon: Nicholas Saez MD;  [facility];  Service: Plastics;  Laterality: N/A;  Vertical rectus abdominis flap    ESOPHAGOGASTRODUODENOSCOPY      EXAMINATION UNDER ANESTHESIA N/A 10/12/2020    Procedure: Exam under anesthesia, flex sig;  Surgeon: Blake Jacobson MD;  [address]  Service: Endoscopy;  Laterality: N/A;  Rectal and vaginal areas    FISTULA REPAIR      anal fistula    FLAP PROCEDURE N/A 8/16/2021    Procedure: Fascia flap creation;  Surgeon: Nicholas Saez MD;  Location: [facility] OR [address] [place] [address];  Service: Plastics;  Laterality: N/A;    FLEXIBLE SIGMOIDOSCOPY  10/12/2020    Procedure: SIGMOIDOSCOPY, FLEXIBLE;  Surgeon: Blake Jacobson  MD;  [facility];  Service: Endoscopy;;    FUSION OF CERVICAL SPINE BY POSTERIOR APPROACH      anterior and posterior    RADIOFREQUENCY ABLATION OF LIVER LESION  4/6/2021    Procedure: RADIOFREQUENCY ABLATION, LESION, LIVER Robotic of segment 8 ;  Surgeon: Luciano Castaneda MD;  [facility];  Service: General;;    REVISION OF COLOSTOMY WITH REPAIR OF PARACOLOSTOMY HERNIA N/A 8/16/2021    Procedure: REVISION, COLOSTOMY, WITH PARACOLOSTOMY HERNIA REPAIR;  Surgeon: RAHUL Jacobson MD;  [facility];  Service: Colon and Rectal;  Laterality: N/A;    ROBOT-ASSISTED LAPAROSCOPIC PARTIAL SURGICAL REMOVAL OF LIVER USING DA MARBELLA XI N/A 4/6/2021    Procedure: XI ROBOTIC RESECTION, LIVER - RIGHT POSTERIOR - NEED DROP IN BK PROBE;  Surgeon: Luciano Castaneda MD;  [facility];  Service: General;  Laterality: N/A;    TOTAL ABDOMINAL HYSTERECTOMY W/ BILATERAL SALPINGOOPHORECTOMY N/A 8/16/2021    Procedure: HYSTERECTOMY, TOTAL, ABDOMINAL, WITH BILATERAL SALPINGO-OOPHORECTOMY;  Surgeon: Alejo Mccullough MD;  [facility];  Service: OB/GYN;  Laterality: N/A;    VAGINA RECONSTRUCTION SURGERY N/A 8/16/2021    Procedure: RECONSTRUCTION, VAGINA;  Surgeon: Nicholas Saez MD;  [facility];  Service: Plastics;  Laterality: N/A;    VAGINECTOMY N/A 8/16/2021    Procedure: VAGINECTOMY;  Surgeon: RAHUL Jacobson MD;  [facility];  Service: Colon and Rectal;  Laterality: N/A;  Posterior     Family History       Problem Relation (Age of Onset)    Breast cancer Mother (44), Maternal Grandmother    Cancer Mother, Paternal Grandmother    Heart disease Father    Hyperlipidemia Father    Prostate cancer Father          Tobacco Use    Smoking status: Never    Smokeless tobacco: Never   Substance and Sexual Activity    Alcohol use: Never    Drug use: Never    Sexual activity: Not Currently     Review of Systems   All other systems reviewed  and are negative.    Objective:     Vital Signs (Most Recent):  Temp: 98.4 °F (36.9 °C) (10/14/24 0004)  Pulse: 74 (10/14/24 0116)  Resp: 20 (10/14/24 0116)  BP: (!) 145/66 (10/14/24 0109)  SpO2: 97 % (10/14/24 0116) Vital Signs (24h Range):  Temp:  [98.4 °F (36.9 °C)] 98.4 °F (36.9 °C)  Pulse:  [74-84] 74  Resp:  [18-20] 20  SpO2:  [97 %] 97 %  BP: (131-145)/(60-66) 145/66     Weight: 105.7 kg (233 lb 0.4 oz)  Body mass index is 40 kg/m².     Physical Exam  Constitutional:       General: She is not in acute distress.     Appearance: Normal appearance. She is obese. She is not ill-appearing or toxic-appearing.   HENT:      Head: Normocephalic.      Mouth/Throat:      Mouth: Mucous membranes are moist.      Pharynx: Oropharynx is clear.   Eyes:      Extraocular Movements: Extraocular movements intact.      Conjunctiva/sclera: Conjunctivae normal.   Cardiovascular:      Rate and Rhythm: Normal rate and regular rhythm.   Pulmonary:      Effort: Pulmonary effort is normal. No respiratory distress.   Abdominal:      General: Abdomen is flat. There is no distension.      Palpations: Abdomen is soft.      Tenderness: There is no abdominal tenderness. There is no guarding.      Comments: Well healed incisions. Parastomal hernia present. End colostomy pink with minuscule area of ooze at the superior junction of the skin and mucosa.   Skin:     General: Skin is warm and dry.      Coloration: Skin is pale (chronically pale, this is baseline).   Neurological:      General: No focal deficit present.      Mental Status: She is alert.   Psychiatric:         Mood and Affect: Mood normal.         Behavior: Behavior normal.         Thought Content: Thought content normal.         Judgment: Judgment normal.       Significant Labs:  Hgb 10/4/24 7.4  Hgb today 7.4    Significant Diagnostics:  None

## 2024-10-15 NOTE — HOSPITAL COURSE
Patient known to me following abdominal perineal resection with end colostomy creation in August of 2021. She was recently admitted with bleeding at her ostomy site. This was controlled with topical silver nitrate and she was discharged. She presents with a bleeding just from the superior aspect of her ostomy site. Controlled with a suture. The ostomy appliance was removed and the ostomy was inspected. Photo below. Few prominent parastomal veins. Known liver dysfunction with thrombocytopenia. Since she is no longer bleeding, will transfuse 1 unit of blood to catch up from acute blood loss anemia. If she continues to have bleeding, we can talk about stoma relocation and potential hernia repair. Silver nitrate was given to her with instructions as well. Pt observed in ER, one unit of blood given, rachel well, no further bleeding from stoma noted.  Pt discharged home to  [facility] as needed, has silver nitrate sticks, VS stable and feeling better

## 2024-10-15 NOTE — DISCHARGE SUMMARY
[facility] - Emergency Dept  Colorectal Surgery  Discharge Summary      Patient Name: Meron Lamar  MRN: [id number]  Admission Date: 10/14/2024  Hospital Length of Stay: 0 days  Discharge Date and Time: 10/14/2024  2:18 PM  Attending Physician: No att. providers found   Discharging Provider: Yumiko Smith NP  Primary Care Provider: Qasim Boston MD     HPI:  59 F presents for bleeding at her stoma. Recently admitted 10/4/24 for bleeding, controlled with cautery and was transfused 3u. She noted BRB in her stoma bag today, with blood clots noticeable in multiple bags.  She stopped at a gas station coming here to change the ostomy appliance and noticed spurting blood from the superior portion, same area as before. She changed her appliance and noticed blood was coming from the edge of the stoma/skin. She initially held pressure, which seemed to control it, but was concerned for continued bleed. She presented to the ED for evaluation. She denies symptoms of acute blood loss. No blood in the bag seen by triage in the ED.    Hx of locally advanced rectal cancer with multiple hepatic metastases [id number], s/p APR and end colostomy 8/16/21 and right posterior liver resection segment 6/7 and ablation of liver lesion in segment 8 4/6/21 with positive margins, pulmonary mets treated with SBRT and maintained on adjuvant chemotherapy.     * No surgery found *     Hospital Course:  Patient known to me following abdominal perineal resection with end colostomy creation in August of 2021. She was recently admitted with bleeding at her ostomy site. This was controlled with topical silver nitrate and she was discharged. She presents with a bleeding just from the superior aspect of her ostomy site. Controlled with a suture. The ostomy appliance was removed and the ostomy was inspected. Photo below. Few prominent parastomal veins. Known liver dysfunction with thrombocytopenia. Since she is no longer bleeding, will transfuse 1  unit of blood to catch up from acute blood loss anemia. If she continues to have bleeding, we can talk about stoma relocation and potential hernia repair. Silver nitrate was given to her with instructions as well. Pt observed in ER, one unit of blood given, rachel well, no further bleeding from stoma noted.  Pt discharged home to  [facility] as needed, has silver nitrate sticks, VS stable and feeling better    Goals of Care Treatment Preferences:  Code Status: Full Code      Consults (From admission, onward)          Status Ordering Provider     Inpatient consult to Colorectal Surgery  Once        Provider:  (Not yet assigned)    Completed HITESH RODRÍGUEZ            Significant Diagnostic Studies: Labs: BMP:   Recent Labs   Lab 10/14/24  0109   *      K 4.1   *   CO2 17*   BUN 15   CREATININE 1.1   CALCIUM 8.5*    and CBC   Recent Labs   Lab 10/14/24  0109 10/14/24  0500 10/14/24  1242   WBC 8.16 6.64 10.30   HGB 7.4* 6.7* 7.9*   HCT 28.6* 23.6* 26.7*    153 170       Pending Diagnostic Studies:       None          Final Active Diagnoses:    Diagnosis Date Noted POA    PRINCIPAL PROBLEM:  Acute blood loss anemia [D62] 10/03/2024 Yes      Problems Resolved During this Admission:      Discharged Condition: good    Disposition: Home or Self Care    Follow Up:    Patient Instructions:   No discharge procedures on file.  Medications:  Reconciled Home Medications:      Medication List        CONTINUE taking these medications      acyclovir 400 MG tablet  Commonly known as: ZOVIRAX  Take 400 mg by mouth 2 (two) times daily.     amLODIPine 5 MG tablet  Commonly known as: NORVASC  Take 5 mg by mouth once daily.     EScitalopram oxalate 10 MG tablet  Commonly known as: LEXAPRO  Take 10 mg by mouth every evening.     gabapentin 100 MG capsule  Commonly known as: NEURONTIN  Take 100 mg by mouth 2 (two) times daily.     levothyroxine 25 MCG tablet  Commonly known as: SYNTHROID  Take 25 mcg by mouth  before breakfast.     metoprolol succinate 100 MG 24 hr tablet  Commonly known as: TOPROL-XL  Take 100 mg by mouth nightly.     telmisartan 80 MG Tab  Commonly known as: MICARDIS  Take 80 mg by mouth once daily.     TYLENOL EXTRA STRENGTH 500 MG tablet  Generic drug: acetaminophen  Take 2 tablets by oral route.              Yumiko Smith NP  Colorectal Surgery  [facility] - Emergency Dept

## 2024-10-22 ENCOUNTER — OFFICE VISIT (OUTPATIENT)
Dept: SURGERY | Facility: CLINIC | Age: 59
End: 2024-10-22
Payer: COMMERCIAL

## 2024-10-22 VITALS
SYSTOLIC BLOOD PRESSURE: 146 MMHG | OXYGEN SATURATION: 97 % | HEART RATE: 73 BPM | WEIGHT: 229.25 LBS | RESPIRATION RATE: 16 BRPM | BODY MASS INDEX: 39.14 KG/M2 | DIASTOLIC BLOOD PRESSURE: 65 MMHG | HEIGHT: 64 IN

## 2024-10-22 DIAGNOSIS — K43.5 PARASTOMAL HERNIA WITHOUT OBSTRUCTION OR GANGRENE: Primary | ICD-10-CM

## 2024-10-22 DIAGNOSIS — Z85.048 HISTORY OF RECTAL CANCER: ICD-10-CM

## 2024-10-22 PROCEDURE — 3078F DIAST BP <80 MM HG: CPT | Mod: CPTII,S$GLB,, | Performed by: COLON & RECTAL SURGERY

## 2024-10-22 PROCEDURE — 4010F ACE/ARB THERAPY RXD/TAKEN: CPT | Mod: CPTII,S$GLB,, | Performed by: COLON & RECTAL SURGERY

## 2024-10-22 PROCEDURE — 1159F MED LIST DOCD IN RCRD: CPT | Mod: CPTII,S$GLB,, | Performed by: COLON & RECTAL SURGERY

## 2024-10-22 PROCEDURE — 99213 OFFICE O/P EST LOW 20 MIN: CPT | Mod: S$GLB,,, | Performed by: COLON & RECTAL SURGERY

## 2024-10-22 PROCEDURE — 99999 PR PBB SHADOW E&M-EST. PATIENT-LVL III: CPT | Mod: PBBFAC,,, | Performed by: COLON & RECTAL SURGERY

## 2024-10-22 PROCEDURE — 3077F SYST BP >= 140 MM HG: CPT | Mod: CPTII,S$GLB,, | Performed by: COLON & RECTAL SURGERY

## 2024-10-22 PROCEDURE — 3008F BODY MASS INDEX DOCD: CPT | Mod: CPTII,S$GLB,, | Performed by: COLON & RECTAL SURGERY

## 2024-10-22 NOTE — PROGRESS NOTES
"CRS Office Visit Followup    Referring Md:   No referring provider defined for this encounter.    SUBJECTIVE:     Chief Complaint: rectal cancer    History of Present Illness:  The patient is new patient to this practice.   Course is as follows:  Patient is a 59 y.o. female presents with locally advanced rectal cancer.     11/27/19:   8/28/20:  Colonoscopy revealed a single sessile 1.4 cm nonbleeding polyp in the mid rectum at 8 cm from the anus. There was an infiltrative, ulcerated and fungating mass at the distal rectum, measuring 3 cm, causing partial obstruction. This was her first colonoscopy  Pathology: Polyp showed tubular adenoma with focal HG dysplasia. Distal rectal mass biopsy c/w adenocarcinoma with ulceration"   - MMR normal/proficient.     Staging:  - CEA is 31.3  - 9/15/2020 CT C/A/P  4mm R lung nodule and additional region of somewhat nodular atelectasis in the LLL up to 5mm.  2 cm ill-defined region in posterior R lobe of liver present on prior CT imaging 2017. 3.5 cm regional fatty sparing in posteroinferior most aspect of the R lobe of liver. No significant lymph node enlargement in upper abdomen. Few stable lymph nodes in central mesentery unchanged from 2017.  Impression, no CT evidence for metastatic disease.   - 9/29/2020 MRI pelvis  Low stenotic, partially circumferential rectal carcinoma measuring 3.6 cm in length with max thickness 1.7 cm. Distal portion of the tumor from the anal verge measured approx 3.2 cm with partial involvement of the muscularis propria. No evidence of fat plane separation within the posterior wall of the vagina. Largest R presacral node measuring approx 1 x 0.7 cm, another 7 x 5 mm, another 8 x 5 mm.  Uterus 6 x 2.9 x 5.6 cm with 2.1 cm fibroid.  In the posterior wall of the vagina is a mass measuring approx 4.8 x 2.2 x 3.2 cm without connection with the rectal mass. Differential includes vaginal leiomyoma but cannot rule out vaginal carcinoma or LMS.    10/12/20:  " EUA with biopsies --> Locally invasive rectal cancer.  Digital exam demonstrated tumor in the posterior vaginal wall.  Anteriorly was clear.  Lateral sidewalls felt clear on the anterior aspect of the lateral sidewall.  Digital exam of the anus demonstrated large near circumferential rectal mass mostly located anteriorly and extending onto the patient's left side.  Felt tethered to the left levators.  Mass was continuous with the posterior vaginal mass.  Mass was ulcerated in the central aspect anteriorly.  Pat on 10/14/2020 demonstrated hypermetabolic mass in the right lobe of the liver not well visualized on CT scan but appears to measure 3.5 cm.  Additional more inferior mass in right side of the liver also consistent with metastatic disease.  11/06/2020:  Liver biopsy consistent with metastatic colorectal adenocarcinoma.    Plan was for PATRICK.  She completed short course radiotherapy from 10/26/2020 to 10/30/2020.  FOLFOX was started on 11/17/2020.      Medical comorbidities include DM, HTN, hyperthyroidism and sleep apnea (CPAP). BMI 34     No prior abdominal surgeries.  She has had a prior anal fistula versus fissure surgery 20+ years ago.  At that time, she had a flexible sigmoidoscopy that was normal.    Functionally, she is active and performs all of her activities of daily living.  Nonsmoker.  No significant weight loss.  Historically, she had 3 bowel movements per day.  No fecal incontinence.     Family history for father - prostate cancer (dec). Mother with breast cancer (44, dec). MGM and PGM also with unknown cancer.    Tolerated short course XRT from 10/26/20 to 10/20/20.  Neoadjuvant FOLFOX started 11/17/2020. She got 3 cycles and stopped after 12/15/21.  Stopped for vaginal drainage with RV fistula.     01/13/2021:  Underwent laparoscopic loop sigmoid colostomy secondary to rectovaginal fistula    Transitioned for FOLFOXIRI on 2/3/21. She got 3 cycles.      4/6/2021: (Luciano Castaneda)  Robotic assisted  segment 6/7 liver resection (right posterior hepatectomy)  Robotic assisted microwave ablation segment 8 lesion   Pathology:   Multifocal (2 foci) metastatic adenocarcinoma consistent with colonic origin,   2.6 cm and 1.3cm   Resection margin is POSITIVE for one of the lesion     8/16/2021:   1.  Abdominoperineal resection     2.  Posterior vaginectomy  3.  Parastomal hernia repair with revision of colostomy  4.  Unilateral external oblique release with myofascial advancement to allow for abdominal closure  5.  Total abdominal hysterectomy and bilateral salpingo oophorectomy performed by gyn Oncology  6.  Pedicled lap reconstruction for the pelvis performed Plastic surgery  Pathology:  1. Uterus and cervix, bilateral fallopian tubes and bilateral ovaries; total   hysterectomy , bilateral salpingectomies and bilateral oophorectomies   (54.5g):   Cervix:            Serosa:   Positive  for colorectal adenocarcinoma with signet ring cell features   Colorectal adenocarcinoma (6.0 cm):    Macroscopic evaluation of mesorectum:   Complete    Tumor site:  Rectum:   Entirely below anterior peritoneal reflection    Histologic type:   Adenocarcinoma with mucinous (10%) and signet ring cell (5%) features    Histologic type:  Moderately to poorly differentiated (G2-G3)    Tumor size:   6 cm    Tumor extent:   Directly invades or adherence to adjacent structures, vaginal wall and uterine serosa (T4b)   Macroscopic tumor perforation:   Present (rectovaginal fistula)    Lymphovascular invasion:  Present:   Small vessel    Perineural invasion:   Not identified    Treatment effect:   Present, with residual cancer showing evident tumor  regression, but more than single cells or rare small groups (partial  response, score 2)    Margins:     Left vaginal wall  margin-positive  for carcinoma     - Additional Left vaginal wall margin sent.  Tumor in part of the margin but not the entire margin.  Specimen with orientation.  Confirmed with  pathologist that tumor only on 1 edge.  Therefore, final margins negative.    Regional lymph node status:   All examined lymph nodes are  negative  for metastatic carcinoma (0/12)    Current status:  9/14/21:  Doing very well.  Intermittent drainage from her perineum.  Currently wearing depends changing than 2-3 times per day.  No fevers or chills.  Pain well controlled.  Ostomy functioning well.  Eating.  Ambulating without assistance.  One drain remains.  Low output.  10/12/21:  Continues to do well.  Active.  No issues with her ostomy.  Decreased vaginal drainage.  Activity is improving.  10/10/23:   Presents for evaluation for parastomal hernia.  She was recently diagnosed with a pulmonary nodule consistent with metastatic disease.  She underwent SBRT.  She remains on adjuvant chemotherapy and is followed by Dr. Cayetano Stearns.   She presents for intermittent pain and swelling around her ostomy as well as increasing difficulty with pouching.  She has gained 20 lb over the past several months.  Regarding the ostomy, she has increased leaks as well as intermittent pain with bending over or movement.  10/22/24: she was recently admitted x 2 for stoma bleeding requiring transfusion.  No bleeding since she was last discharged.  She has a new ostomy appliance in place which is working well.  Regarding the hernia, she has pain with standing from a sitting position.  Otherwise, she is doing well.  She has been off chemotherapy for the past 6 weeks secondary to an upper respiratory tract infection but resumes FOLFIRI with Avastin later this week.  Repeat CT scan plan for this upcoming week.  Last PET scan from 2 months ago with ongoing retroperitoneal adenopathy consistent with known metastatic disease that is stable from prior exam.    Surveillance:   9/9/2022:  Surveillance colonoscopy performed.  - Impression:            - Widely patent end colostomy with healthy                          appearing mucosa in the  "descending colon.                          - The examined portion of the ileum was normal.                          - The descending colon, transverse colon and                          ascending colon are normal.                          - No specimens collected.     Review of Systems:  Review of Systems   Constitutional:  Negative for chills, diaphoresis, fever, malaise/fatigue and weight loss.   HENT:  Negative for congestion.    Respiratory:  Negative for shortness of breath.    Cardiovascular:  Negative for chest pain and leg swelling.   Gastrointestinal:  Negative for abdominal pain, blood in stool, constipation, diarrhea, nausea and vomiting.   Genitourinary:  Negative for dysuria.   Musculoskeletal:  Negative for back pain and myalgias.   Skin:  Negative for rash.   Neurological:  Negative for dizziness and weakness.   Endo/Heme/Allergies:  Does not bruise/bleed easily.   Psychiatric/Behavioral:  Negative for depression.        OBJECTIVE:     Vital Signs (Most Recent)  BP (!) 146/65 (BP Location: Left arm, Patient Position: Sitting)   Pulse 73   Resp 16   Ht 5' 4" (1.626 m)   Wt 104 kg (229 lb 4.5 oz)   SpO2 97%   BMI 39.36 kg/m²     Physical Exam:  General: White female in no distress   Neuro: alert and oriented x 4.  Moves all extremities.     HEENT: no icterus.  Trachea midline  Respiratory: respirations are even and unlabored  Cardiac: tachycardic  Abdomen:  Midline incision well healed.  Ostomy in the left mid abdomen is healthy and pink and protrudes above the level of the skin. Surrounding peristomal hernia is firm and unable to be completely reduced.  No bleeding.  Extremities: Warm dry and intact  Skin: no rashes  Anorectal:  Deferred today.  From prior exam: Perineal flap incision is healing very well.  Small amount of granulation tissue treated with silver nitrate.  Posterior vaginal wall intact and healing well.    Labs:   H&H 8 and 28.  Alb 4.1.  Normal renal function  CEA down to 4.2 from " 30+    Imaging:   MRI 1/8/2021: Locally invasive low rectal carcinoma with anal sphincter and vaginal involvement with moderate response to treatment.  There is similar tumor extent to the 09/29/2020 exam, but significantly decreased in size and improved signal characteristics, as above.  Development of colovaginal fistula noted.  Two suspicious perirectal lymph nodes, significantly improved.  CT C/A/P 01/08/2021:: multiple hypodense lesions in the right hepatic lobe, some of which appear new since the prior outside CT from 09/15/2020 and are potentially concerning for intrahepatic metastasis         ASSESSMENT/PLAN:     Meron was seen today for follow-up and rectal cancer.    Diagnoses and all orders for this visit:    Parastomal hernia without obstruction or gangrene    History of rectal cancer          59 y.o. woman with locally advanced rectal cancer with multiple hepatic metastases.  She has received short course radiotherapy followed by FOLFOX chemotherapy.  As response to her treatment, her tumor has decreased in size and she has now developed a rectovaginal fistula.    With the RV fistula, she has stopped eating and is unable to tolerate chemotherapy.   Therefore, urgent fecal diversion was offered with a laparoscopic diverting loop sigmoid colostomy.  This was performed on 01/13/2021.  She then had 3 additional cycles of chemotherapy.    Liver resection was done on 4/6/21.  Positive margins.     Abdominal perineal resection with EN bloc posterior vaginectomy and pedicle flap reconstruction performed on 08/16/2021 for yp T4b N0 M1c rectal cancer.     She had pulmonary metastatic disease treated by SBRT.  She now has retroperitoneal lymphadenopathy be controlled with FOLFIRI and Avastin.    Regarding her parastomal hernia, 2 options were given to her:  1) ongoing observation with her new ostomy appliance.  Discussed that the parastomal hernia may increase in size over time especially if she were to gain  weight.  2) robotic parastomal hernia repair with sugar Moon technique.  Discussed that this may require open surgery depending on the amount of adhesions.  I have asked for her recent CT scan it is being done tomorrow for evaluation of the hernia contents in the size of the defect.  Discussed that this would likely require 2 months off of chemotherapy in total.  There is a risk of cancer spread during the time off of chemo.  3) relocation of the ostomy to the upper abdomen.  If parastomal hernia repair did not work were did not affect the bleeding, we would then need to move the ostomy to the upper abdomen.    Since she is currently not having significant symptoms, ongoing observation was recommended.  She will continue with her chemotherapy.  She will send me the CT report and images from tomorrow.  She will follow up with me through the portal with increased pain, bleeding, difficulty with pouching.        RAHUL Jacobson MD, FACS, FASCRS  Staff Surgeon  Colon & Rectal Surgery      CC: Cayetano Stearns MD

## 2025-03-12 ENCOUNTER — PATIENT MESSAGE (OUTPATIENT)
Dept: ORTHOPEDICS | Facility: CLINIC | Age: 60
End: 2025-03-12
Payer: COMMERCIAL

## 2025-03-12 ENCOUNTER — TELEPHONE (OUTPATIENT)
Dept: ORTHOPEDICS | Facility: CLINIC | Age: 60
End: 2025-03-12
Payer: COMMERCIAL

## 2025-03-12 DIAGNOSIS — R52 PAIN: Primary | ICD-10-CM

## 2025-03-13 ENCOUNTER — OFFICE VISIT (OUTPATIENT)
Dept: ORTHOPEDICS | Facility: CLINIC | Age: 60
End: 2025-03-13
Payer: COMMERCIAL

## 2025-03-13 ENCOUNTER — HOSPITAL ENCOUNTER (OUTPATIENT)
Dept: RADIOLOGY | Facility: HOSPITAL | Age: 60
Discharge: HOME | End: 2025-03-13
Attending: PHYSICIAN ASSISTANT
Payer: COMMERCIAL

## 2025-03-13 VITALS
RESPIRATION RATE: 16 BRPM | SYSTOLIC BLOOD PRESSURE: 146 MMHG | DIASTOLIC BLOOD PRESSURE: 80 MMHG | WEIGHT: 222.63 LBS | BODY MASS INDEX: 38.01 KG/M2 | HEIGHT: 64 IN | OXYGEN SATURATION: 98 % | HEART RATE: 72 BPM

## 2025-03-13 DIAGNOSIS — M17.12 PRIMARY OSTEOARTHRITIS OF LEFT KNEE: Primary | ICD-10-CM

## 2025-03-13 DIAGNOSIS — M25.562 CHRONIC PAIN OF LEFT KNEE: ICD-10-CM

## 2025-03-13 DIAGNOSIS — G89.29 CHRONIC PAIN OF LEFT KNEE: ICD-10-CM

## 2025-03-13 DIAGNOSIS — R52 PAIN: ICD-10-CM

## 2025-03-13 PROCEDURE — 99999 PR PBB SHADOW E&M-EST. PATIENT-LVL IV: CPT | Mod: PBBFAC,,, | Performed by: PHYSICIAN ASSISTANT

## 2025-03-13 PROCEDURE — 73562 X-RAY EXAM OF KNEE 3: CPT | Mod: TC,RT

## 2025-03-13 RX ORDER — ERGOCALCIFEROL 1.25 MG/1
CAPSULE ORAL
COMMUNITY
Start: 2024-03-21

## 2025-03-13 RX ORDER — ONDANSETRON 4 MG/1
4 TABLET, ORALLY DISINTEGRATING ORAL
COMMUNITY
Start: 2024-10-17

## 2025-03-13 RX ORDER — PROMETHAZINE HYDROCHLORIDE 12.5 MG/1
12.5 TABLET ORAL EVERY 6 HOURS PRN
COMMUNITY
Start: 2024-10-17

## 2025-03-13 RX ORDER — TRIAMCINOLONE ACETONIDE 40 MG/ML
40 INJECTION, SUSPENSION INTRA-ARTICULAR; INTRAMUSCULAR ONCE
Status: COMPLETED | OUTPATIENT
Start: 2025-03-13 | End: 2025-03-13

## 2025-03-13 RX ADMIN — TRIAMCINOLONE ACETONIDE 40 MG: 40 INJECTION, SUSPENSION INTRA-ARTICULAR; INTRAMUSCULAR at 09:03

## 2025-03-13 NOTE — PROGRESS NOTES
Subjective:      Patient ID: Meron Lamar is a 59 y.o. female.    Chief Complaint: Pain and Swelling of the Left Knee    Review of patient's allergies indicates:  No Known Allergies   58 yo F presents to clinic with c/o left knee pain x few months.  No injury or trauma.  Achy pain mostly to medial knee.  Worse with walking/standing and improves some with rest.  She does have occasional swelling to knee, no redness or warmth.  No catching, locking, giving way.  She has not tried any treatment for this yet.  She is currently undergoing treatment for colon cancer.        Review of Systems   Constitutional: Negative for chills, diaphoresis and fever.   HENT:  Negative for congestion, ear discharge and ear pain.    Eyes:  Negative for blurred vision, discharge, double vision and pain.   Cardiovascular:  Negative for chest pain, claudication and cyanosis.   Respiratory:  Negative for cough, hemoptysis and shortness of breath.    Endocrine: Negative for cold intolerance and heat intolerance.   Skin:  Negative for color change, dry skin, itching and rash.   Musculoskeletal:  Positive for joint pain and joint swelling. Negative for arthritis, back pain, falls, gout, muscle weakness and neck pain.   Gastrointestinal:  Negative for abdominal pain and change in bowel habit.   Neurological:  Negative for brief paralysis, disturbances in coordination, dizziness, numbness and paresthesias.   Psychiatric/Behavioral:  Negative for altered mental status and depression.          Objective:          General    Constitutional: She is oriented to person, place, and time. She appears well-developed and well-nourished. No distress.   HENT:   Head: Atraumatic.   Eyes: EOM are normal. Right eye exhibits no discharge. Left eye exhibits no discharge.   Cardiovascular:  Normal rate.            Pulmonary/Chest: Effort normal. No respiratory distress.   Abdominal: Soft.   Neurological: She is alert and oriented to person, place, and time.    Psychiatric: She has a normal mood and affect. Her behavior is normal.           Right Knee Exam     Inspection   Erythema: absent  Scars: absent  Swelling: absent  Effusion: absent  Deformity: absent  Bruising: absent    Tenderness   The patient is experiencing no tenderness.     Crepitus   The patient has crepitus of the medial joint line.    Range of Motion   The patient has normal right knee ROM.    Tests   Meniscus   Atif:  Medial - negative Lateral - negative  Ligament Examination   MCL - Valgus: normal (0 to 2mm)  LCL - Varus: normal    Other   Sensation: normal    Left Knee Exam     Inspection   Erythema: absent  Scars: absent  Swelling: present  Effusion: absent  Deformity: absent  Bruising: absent    Tenderness   The patient tender to palpation of the medial joint line.    Crepitus   The patient has crepitus of the medial joint line.    Range of Motion   The patient has normal left knee ROM.    Tests   Meniscus   Atif:  Medial - negative Lateral - negative  Stability   MCL - Valgus: normal (0 to 2mm)  LCL - Varus: normal (0 to 2mm)    Other   Sensation: normal    Vascular Exam       Edema  Right Lower Leg: present  Left Lower Leg: present              Assessment:         Xray Left Knee 3/13/25:  Mild bilateral medial compartment joint space narrowing, right greater than left.       Encounter Diagnoses   Name Primary?    Chronic pain of left knee     Primary osteoarthritis of left knee Yes    Primary osteoarthritis of left knee  -     triamcinolone acetonide injection 40 mg    Chronic pain of left knee  -     triamcinolone acetonide injection 40 mg               Plan:         The total face-to-face encounter time with this patient was 30 minutes and greater than 50% of of the encounter time was spent counseling the patient, coordinating care, and education regarding the pathology and natural history of her diagnosis. We have discussed a variety of treatment options including medications,  injections, physical therapy and other alternative treatments. Pt is requesting CSI and home exercise program at this time. She is also interested in a knee brace to wear while working.  Today, the patient chooses  a CSI and understands a minimum of 3 months time must lapse after injection, prior to a surgical procedure due to increased risk of infection.   We did contact her oncologist Dr. Cayetano Stearns's office, ok to proceed with knee CSI per Dr. Stearns.    I made the decision to obtain old records of the patient including previous notes and imaging. New imaging was ordered today of the extremity or extremities evaluated.       1. Injection Procedure  A time out was performed, including verification of patient ID, procedure, site and side, availability of information and equipment, review of safety issues, and agreement with consent, the procedure site was marked.    After time out was performed, the patient was prepped aseptically with chloraprep swabsticks. A diagnostic and therapeutic injection of 1:3cc Kenalog/Marcaine was given under sterile technique using a 22g x 1.5 needle from the Anterolateral aspect of the left Knee Joint in the sitting position.      Meron Lamar had no adverse reactions to the medication. Pain decreased. She was instructed to apply ice to the joint for 20 minutes and avoid strenuous activities for 24-36 hours following the injection. She was warned of possible blood sugar and/or blood pressure changes during that time. Following that time, she can resume regular activities.    She was reminded to call the clinic immediately for any adverse side effects as explained in clinic today.    2. Knee conditioning home exercise program. [employer] handout of exercises provided to patient.  3. Ice compress to the affected area 2-3x a day for 15-20 minutes as needed for pain management.  4. Hinged knee brace with ambulation as needed.  5. RTC in 6 weeks for follow-up, sooner if needed.    Patient  voices understanding of and agreement with treatment plan. All of the patient's questions were answered and the patient will contact us if she has any questions or concerns in the interim.

## 2025-03-14 ENCOUNTER — TELEPHONE (OUTPATIENT)
Dept: SURGERY | Facility: CLINIC | Age: 60
End: 2025-03-14
Payer: COMMERCIAL

## 2025-03-14 NOTE — TELEPHONE ENCOUNTER
----- Message from Liza sent at 3/14/2025 12:38 PM CDT -----  Regarding: appt  Contact: [phone]  .Type:  Patient Returning CallWho Called:pt Who Left Message for Patient:Frida Does the patient know what this is regarding?:told pt a message is in the portal to explain, she asked me to still send a message to speak to Bennettould the patient rather a call back or a response via [facility]? Call Best Call Back Number:[phone] Information: n/a

## 2025-03-14 NOTE — TELEPHONE ENCOUNTER
Spoke with patient regarding bleeding from ostomy bag. Patient states that she has emptied her bag 4 times since 915 this am. Drainage was blood filled. Patient states that she was admitted to ER at [facility] for bleeding from ostomy and had 3 u PRBC's transfused. States no imaging was done. Urgent message sent to Dr. Jacobson to advise on plan.

## 2025-03-17 ENCOUNTER — TELEPHONE (OUTPATIENT)
Dept: SURGERY | Facility: CLINIC | Age: 60
End: 2025-03-17
Payer: COMMERCIAL

## 2025-03-17 NOTE — TELEPHONE ENCOUNTER
Placed call to pt regarding request below.     Pt offered 1 PM appt [request to come at 12:30 PM] w/ JMI Triplett prior to confirmed appt w/ Dr. Jacobson on 3/18. Verbal approval was received. Ostomy appt successfully booked.     No additional needs identified at this time.    ----- Message from Ioana Boston NP sent at 3/17/2025 10:08 AM CDT -----  Hey since be is off today can you schedule this pt with me tomorrow and just tell her to come see me for 12:30? Thanks!  ----- Message -----  From: Frida Marquez RN  Sent: 3/14/2025   4:45 PM CDT  To: Ioana Boston NP    Hey - Mele wants to see her on Tuesday. IS there any way you could squeeze her in that day?

## 2025-03-18 ENCOUNTER — OFFICE VISIT (OUTPATIENT)
Dept: SURGERY | Facility: CLINIC | Age: 60
End: 2025-03-18
Payer: COMMERCIAL

## 2025-03-18 VITALS
WEIGHT: 220.44 LBS | BODY MASS INDEX: 37.63 KG/M2 | SYSTOLIC BLOOD PRESSURE: 137 MMHG | HEIGHT: 64 IN | DIASTOLIC BLOOD PRESSURE: 64 MMHG | HEART RATE: 82 BPM

## 2025-03-18 DIAGNOSIS — Z85.048 HISTORY OF RECTAL CANCER: ICD-10-CM

## 2025-03-18 DIAGNOSIS — K43.5 PARASTOMAL HERNIA WITHOUT OBSTRUCTION OR GANGRENE: ICD-10-CM

## 2025-03-18 DIAGNOSIS — Z85.048 HISTORY OF RECTAL CANCER: Primary | ICD-10-CM

## 2025-03-18 DIAGNOSIS — K43.5 PARASTOMAL HERNIA WITHOUT OBSTRUCTION OR GANGRENE: Primary | ICD-10-CM

## 2025-03-18 DIAGNOSIS — Z93.3 COLOSTOMY IN PLACE: ICD-10-CM

## 2025-03-18 PROCEDURE — 99999 PR PBB SHADOW E&M-EST. PATIENT-LVL V: CPT | Mod: PBBFAC,,, | Performed by: COLON & RECTAL SURGERY

## 2025-03-18 PROCEDURE — 1160F RVW MEDS BY RX/DR IN RCRD: CPT | Mod: CPTII,S$GLB,, | Performed by: COLON & RECTAL SURGERY

## 2025-03-18 PROCEDURE — 3078F DIAST BP <80 MM HG: CPT | Mod: CPTII,S$GLB,, | Performed by: COLON & RECTAL SURGERY

## 2025-03-18 PROCEDURE — 4010F ACE/ARB THERAPY RXD/TAKEN: CPT | Mod: CPTII,S$GLB,, | Performed by: NURSE PRACTITIONER

## 2025-03-18 PROCEDURE — 1160F RVW MEDS BY RX/DR IN RCRD: CPT | Mod: CPTII,S$GLB,, | Performed by: NURSE PRACTITIONER

## 2025-03-18 PROCEDURE — 3008F BODY MASS INDEX DOCD: CPT | Mod: CPTII,S$GLB,, | Performed by: COLON & RECTAL SURGERY

## 2025-03-18 PROCEDURE — 1159F MED LIST DOCD IN RCRD: CPT | Mod: CPTII,S$GLB,, | Performed by: NURSE PRACTITIONER

## 2025-03-18 PROCEDURE — 4010F ACE/ARB THERAPY RXD/TAKEN: CPT | Mod: CPTII,S$GLB,, | Performed by: COLON & RECTAL SURGERY

## 2025-03-18 PROCEDURE — 99999 PR PBB SHADOW E&M-EST. PATIENT-LVL III: CPT | Mod: PBBFAC,,, | Performed by: NURSE PRACTITIONER

## 2025-03-18 PROCEDURE — 1159F MED LIST DOCD IN RCRD: CPT | Mod: CPTII,S$GLB,, | Performed by: COLON & RECTAL SURGERY

## 2025-03-18 PROCEDURE — 3075F SYST BP GE 130 - 139MM HG: CPT | Mod: CPTII,S$GLB,, | Performed by: COLON & RECTAL SURGERY

## 2025-03-18 PROCEDURE — 99214 OFFICE O/P EST MOD 30 MIN: CPT | Mod: S$GLB,,, | Performed by: COLON & RECTAL SURGERY

## 2025-03-18 PROCEDURE — 99211 OFF/OP EST MAY X REQ PHY/QHP: CPT | Mod: S$GLB,,, | Performed by: NURSE PRACTITIONER

## 2025-03-18 RX ORDER — SODIUM, POTASSIUM,MAG SULFATES 17.5-3.13G
1 SOLUTION, RECONSTITUTED, ORAL ORAL DAILY
Qty: 1 KIT | Refills: 0 | Status: SHIPPED | OUTPATIENT
Start: 2025-03-18 | End: 2025-03-20

## 2025-03-18 NOTE — PROGRESS NOTES
"Subjective     Patient ID: Meron Lamar is a 59 y.o. female.    Chief Complaint: No chief complaint on file.    This is a pt unknown to me and here today seeing Dr. Jacobson for possible parastomal hernia repair. She had APR in 2021.   She has gained some wt and has a moderate left sided hernia behind her colostomy, it does bother her more at some times than others.   But she had bronchitis after covid and her cough she believe led to this . She comes with her spouse.   Since her last visit she has been admitted with multiple units of blood transfused. Last being last week.  Starts bleeding from stoma, filling ostomy pouch with blood.          Review of Systems   Constitutional: Negative.    Gastrointestinal:  Negative for constipation and diarrhea.        Left sided colostomy   Genitourinary:  Negative for difficulty urinating.   Musculoskeletal: Negative.    Integumentary:  Negative for rash.          Objective     Physical Exam  Constitutional:       Appearance: Normal appearance. She is obese.   Pulmonary:      Effort: Pulmonary effort is normal. No respiratory distress.   Abdominal:      Tenderness: There is no abdominal tenderness.      Hernia: A hernia is present.       Note the bulge on left side,    She has experienced more freq leaks and needs to try alternate pouch system             3/18/25  She is wearing flat ERIC cut to fit with ring today, however has usually been wearing coloplast convex flip with ring cut to 45 mm.  -stoma today is ~ 48 mm.   Measured for Security hernia binder and she is a LARGE< 11" and hole on left, gave her brochure, to see if [employer] can supply this and if not she can call company [employer] directly        Assessment and Plan     1. History of rectal cancer    2. Parastomal hernia without obstruction or gangrene    3. Colostomy in place          Placed in convex flip with ring to 48 mm today.  Fu as needed  I have reviewed the plan of care with the patient and/ or " caregiver and they express understanding.   30 minute visit in face to face counseling.           No follow-ups on file.

## 2025-03-18 NOTE — PROGRESS NOTES
"CRS Office Visit Followup    Referring Md:   No referring provider defined for this encounter.    SUBJECTIVE:     Chief Complaint: rectal cancer    History of Present Illness:    Course is as follows:  Patient is a 59 y.o. female presents with locally advanced rectal cancer.     11/27/19:   8/28/20:  Colonoscopy revealed a single sessile 1.4 cm nonbleeding polyp in the mid rectum at 8 cm from the anus. There was an infiltrative, ulcerated and fungating mass at the distal rectum, measuring 3 cm, causing partial obstruction. This was her first colonoscopy  Pathology: Polyp showed tubular adenoma with focal HG dysplasia. Distal rectal mass biopsy c/w adenocarcinoma with ulceration"   - MMR normal/proficient.     Staging:  - CEA is 31.3  - 9/15/2020 CT C/A/P  4mm R lung nodule and additional region of somewhat nodular atelectasis in the LLL up to 5mm.  2 cm ill-defined region in posterior R lobe of liver present on prior CT imaging 2017. 3.5 cm regional fatty sparing in posteroinferior most aspect of the R lobe of liver. No significant lymph node enlargement in upper abdomen. Few stable lymph nodes in central mesentery unchanged from 2017.  Impression, no CT evidence for metastatic disease.   - 9/29/2020 MRI pelvis  Low stenotic, partially circumferential rectal carcinoma measuring 3.6 cm in length with max thickness 1.7 cm. Distal portion of the tumor from the anal verge measured approx 3.2 cm with partial involvement of the muscularis propria. No evidence of fat plane separation within the posterior wall of the vagina. Largest R presacral node measuring approx 1 x 0.7 cm, another 7 x 5 mm, another 8 x 5 mm.  Uterus 6 x 2.9 x 5.6 cm with 2.1 cm fibroid.  In the posterior wall of the vagina is a mass measuring approx 4.8 x 2.2 x 3.2 cm without connection with the rectal mass. Differential includes vaginal leiomyoma but cannot rule out vaginal carcinoma or LMS.    10/12/20:  EUA with biopsies --> Locally invasive rectal " cancer.  Digital exam demonstrated tumor in the posterior vaginal wall.  Anteriorly was clear.  Lateral sidewalls felt clear on the anterior aspect of the lateral sidewall.  Digital exam of the anus demonstrated large near circumferential rectal mass mostly located anteriorly and extending onto the patient's left side.  Felt tethered to the left levators.  Mass was continuous with the posterior vaginal mass.  Mass was ulcerated in the central aspect anteriorly.  Pat on 10/14/2020 demonstrated hypermetabolic mass in the right lobe of the liver not well visualized on CT scan but appears to measure 3.5 cm.  Additional more inferior mass in right side of the liver also consistent with metastatic disease.  11/06/2020:  Liver biopsy consistent with metastatic colorectal adenocarcinoma.    Plan was for PATRICK.  She completed short course radiotherapy from 10/26/2020 to 10/30/2020.  FOLFOX was started on 11/17/2020.      Medical comorbidities include DM, HTN, hyperthyroidism and sleep apnea (CPAP). BMI 34     No prior abdominal surgeries.  She has had a prior anal fistula versus fissure surgery 20+ years ago.  At that time, she had a flexible sigmoidoscopy that was normal.    Functionally, she is active and performs all of her activities of daily living.  Nonsmoker.  No significant weight loss.  Historically, she had 3 bowel movements per day.  No fecal incontinence.     Family history for father - prostate cancer (dec). Mother with breast cancer (44, dec). MGM and PGM also with unknown cancer.    Tolerated short course XRT from 10/26/20 to 10/20/20.  Neoadjuvant FOLFOX started 11/17/2020. She got 3 cycles and stopped after 12/15/21.  Stopped for vaginal drainage with RV fistula.     01/13/2021:  Underwent laparoscopic loop sigmoid colostomy secondary to rectovaginal fistula    Transitioned for FOLFOXIRI on 2/3/21. She got 3 cycles.      4/6/2021: (Luciano Castaneda)  Robotic assisted segment 6/7 liver resection (right posterior  hepatectomy)  Robotic assisted microwave ablation segment 8 lesion   Pathology:   Multifocal (2 foci) metastatic adenocarcinoma consistent with colonic origin,   2.6 cm and 1.3cm   Resection margin is POSITIVE for one of the lesion     8/16/2021:   1.  Abdominoperineal resection     2.  Posterior vaginectomy  3.  Parastomal hernia repair with revision of colostomy  4.  Unilateral external oblique release with myofascial advancement to allow for abdominal closure  5.  Total abdominal hysterectomy and bilateral salpingo oophorectomy performed by gyn Oncology  6.  Pedicled lap reconstruction for the pelvis performed Plastic surgery  Pathology:  1. Uterus and cervix, bilateral fallopian tubes and bilateral ovaries; total   hysterectomy , bilateral salpingectomies and bilateral oophorectomies   (54.5g):   Cervix:            Serosa:   Positive  for colorectal adenocarcinoma with signet ring cell features   Colorectal adenocarcinoma (6.0 cm):    Macroscopic evaluation of mesorectum:   Complete    Tumor site:  Rectum:   Entirely below anterior peritoneal reflection    Histologic type:   Adenocarcinoma with mucinous (10%) and signet ring cell (5%) features    Histologic type:  Moderately to poorly differentiated (G2-G3)    Tumor size:   6 cm    Tumor extent:   Directly invades or adherence to adjacent structures, vaginal wall and uterine serosa (T4b)   Macroscopic tumor perforation:   Present (rectovaginal fistula)    Lymphovascular invasion:  Present:   Small vessel    Perineural invasion:   Not identified    Treatment effect:   Present, with residual cancer showing evident tumor  regression, but more than single cells or rare small groups (partial  response, score 2)    Margins:     Left vaginal wall  margin-positive  for carcinoma     - Additional Left vaginal wall margin sent.  Tumor in part of the margin but not the entire margin.  Specimen with orientation.  Confirmed with pathologist that tumor only on 1 edge.   Therefore, final margins negative.    Regional lymph node status:   All examined lymph nodes are  negative  for metastatic carcinoma (0/12)    Current status:  9/14/21:  Doing very well.  Intermittent drainage from her perineum.  Currently wearing depends changing than 2-3 times per day.  No fevers or chills.  Pain well controlled.  Ostomy functioning well.  Eating.  Ambulating without assistance.  One drain remains.  Low output.  10/12/21:  Continues to do well.  Active.  No issues with her ostomy.  Decreased vaginal drainage.  Activity is improving.  10/10/23:   Presents for evaluation for parastomal hernia.  She was recently diagnosed with a pulmonary nodule consistent with metastatic disease.  She underwent SBRT.  She remains on adjuvant chemotherapy and is followed by Dr. Cayetano Stearns.   She presents for intermittent pain and swelling around her ostomy as well as increasing difficulty with pouching.  She has gained 20 lb over the past several months.  Regarding the ostomy, she has increased leaks as well as intermittent pain with bending over or movement.  10/22/24: she was recently admitted x 2 for stoma bleeding requiring transfusion.  No bleeding since she was last discharged.  She has a new ostomy appliance in place which is working well.  Regarding the hernia, she has pain with standing from a sitting position.  Otherwise, she is doing well.  She has been off chemotherapy for the past 6 weeks secondary to an upper respiratory tract infection but resumes FOLFIRI with Avastin later this week.  Repeat CT scan plan for this upcoming week.  Last PET scan from 2 months ago with ongoing retroperitoneal adenopathy consistent with known metastatic disease that is stable from prior exam.  3/18/2025:  She presents for evaluation for increasing parastomal hernia causing tearing of the skin around the ostomy.  She had intermittently passes clot from the bleeding around the ostomy.  Denies any blood from the ostomy  "itself.  Hernia impacts her quality of life and prevents her activities.  She continues on chemotherapy.    Surveillance:   9/9/2022:  Surveillance colonoscopy performed.  - Impression:            - Widely patent end colostomy with healthy                          appearing mucosa in the descending colon.                          - The examined portion of the ileum was normal.                          - The descending colon, transverse colon and                          ascending colon are normal.                          - No specimens collected.     Review of Systems:  Review of Systems   Constitutional:  Negative for chills, diaphoresis, fever, malaise/fatigue and weight loss.   HENT:  Negative for congestion.    Respiratory:  Negative for shortness of breath.    Cardiovascular:  Negative for chest pain and leg swelling.   Gastrointestinal:  Negative for abdominal pain, blood in stool, constipation, diarrhea, nausea and vomiting.   Genitourinary:  Negative for dysuria.   Musculoskeletal:  Negative for back pain and myalgias.   Skin:  Negative for rash.   Neurological:  Negative for dizziness and weakness.   Endo/Heme/Allergies:  Does not bruise/bleed easily.   Psychiatric/Behavioral:  Negative for depression.        OBJECTIVE:     Vital Signs (Most Recent)  /64 (BP Location: Left arm, Patient Position: Sitting)   Pulse 82   Ht 5' 4" (1.626 m)   Wt 100 kg (220 lb 7.4 oz)   BMI 37.84 kg/m²     Physical Exam:  General: White female in no distress   Neuro: alert and oriented x 4.  Moves all extremities.     HEENT: no icterus.  Trachea midline  Respiratory: respirations are even and unlabored  Cardiac: tachycardic  Abdomen:  Midline incision well healed.  Ostomy in the left mid abdomen is healthy and pink and protrudes above the level of the skin. Surrounding peristomal hernia is firm and unable to be completely reduced.  No bleeding.  Extremities: Warm dry and intact  Skin: no rashes  Anorectal:  Deferred " today.  From prior exam: Perineal flap incision is healing very well.  Small amount of granulation tissue treated with silver nitrate.  Posterior vaginal wall intact and healing well.    Labs:   H&H 8 and 28.  Alb 4.1.  Normal renal function  CEA down to 4.2 from 30+    Imaging:   MRI 1/8/2021: Locally invasive low rectal carcinoma with anal sphincter and vaginal involvement with moderate response to treatment.  There is similar tumor extent to the 09/29/2020 exam, but significantly decreased in size and improved signal characteristics, as above.  Development of colovaginal fistula noted.  Two suspicious perirectal lymph nodes, significantly improved.  CT C/A/P 01/08/2021:: multiple hypodense lesions in the right hepatic lobe, some of which appear new since the prior outside CT from 09/15/2020 and are potentially concerning for intrahepatic metastasis     CT abd pelvis 10/23/24 reviewed with 5 5 cm x 6.5 cm parastomal hernia.     ASSESSMENT/PLAN:     Meron was seen today for follow-up.    Diagnoses and all orders for this visit:    Parastomal hernia without obstruction or gangrene  -     Case Request Operating Room: XI ROBOTIC REPAIR, HERNIA, PARASTOMAL, REDUCIBLE, COLONOSCOPY  -     sodium,potassium,mag sulfates (SUPREP BOWEL PREP KIT) 17.5-3.13-1.6 gram SolR; Take 177 mLs by mouth once daily. for 2 days    History of rectal cancer  -     Case Request Operating Room: XI ROBOTIC REPAIR, HERNIA, PARASTOMAL, REDUCIBLE, COLONOSCOPY  -     sodium,potassium,mag sulfates (SUPREP BOWEL PREP KIT) 17.5-3.13-1.6 gram SolR; Take 177 mLs by mouth once daily. for 2 days            59 y.o. woman with locally advanced rectal cancer with multiple hepatic metastases.  She has received short course radiotherapy followed by FOLFOX chemotherapy.  As response to her treatment, her tumor has decreased in size and she has now developed a rectovaginal fistula.    With the RV fistula, she has stopped eating and is unable to tolerate  chemotherapy.   Therefore, urgent fecal diversion was offered with a laparoscopic diverting loop sigmoid colostomy.  This was performed on 01/13/2021.  She then had 3 additional cycles of chemotherapy.    Liver resection was done on 4/6/21.  Positive margins.     Abdominal perineal resection with EN bloc posterior vaginectomy and pedicle flap reconstruction performed on 08/16/2021 for yp T4b N0 M1c rectal cancer.     She had pulmonary metastatic disease treated by SBRT.  She now has retroperitoneal lymphadenopathy be controlled with FOLFIRI and Avastin.    She presents for evaluation for parastomal hernia causing tearing of the parastomal skin resulting in ongoing bleeding as well as pain and impacting her quality of life.  Hernia repair was therefore offered to her.  We discussed robotic parastomal hernia repair with reduction of the internal contents as well as stripping of the hernia sac and partial closure of the fascia followed by mesh onlay in a sugar Moon fashion.  We discussed the risks of damage to surrounding structures, recurrence, pain, bleeding, need for an open incision.  Prior to her procedure, we will plan for colonoscopy to look for any metachronous lesions.  Consents were signed today and all questions answered.      RAHUL Jacobson MD, FACS, FASCRS  Staff Surgeon  Colon & Rectal Surgery      CC: Cayetano Stearns MD

## 2025-03-19 ENCOUNTER — PATIENT MESSAGE (OUTPATIENT)
Dept: SURGERY | Facility: CLINIC | Age: 60
End: 2025-03-19
Payer: COMMERCIAL

## 2025-03-20 ENCOUNTER — TELEPHONE (OUTPATIENT)
Dept: SURGERY | Facility: CLINIC | Age: 60
End: 2025-03-20
Payer: COMMERCIAL

## 2025-03-20 NOTE — TELEPHONE ENCOUNTER
----- Message from RAHUL Jacobson MD sent at 3/19/2025  2:04 PM CDT -----  Hi, We saw her in clinic yesterday for parastomal hernia.  We were able to get the OR on 04/30/2025.  Can he please confirm with her?Thank you!Mele

## 2025-04-21 ENCOUNTER — PATIENT MESSAGE (OUTPATIENT)
Dept: SURGERY | Facility: CLINIC | Age: 60
End: 2025-04-21
Payer: COMMERCIAL

## 2025-04-24 ENCOUNTER — PATIENT MESSAGE (OUTPATIENT)
Dept: SURGERY | Facility: CLINIC | Age: 60
End: 2025-04-24
Payer: COMMERCIAL

## 2025-04-24 ENCOUNTER — TELEPHONE (OUTPATIENT)
Dept: SURGERY | Facility: CLINIC | Age: 60
End: 2025-04-24
Payer: COMMERCIAL

## 2025-04-25 ENCOUNTER — TELEPHONE (OUTPATIENT)
Dept: SURGERY | Facility: CLINIC | Age: 60
End: 2025-04-25
Payer: COMMERCIAL

## 2025-04-25 ENCOUNTER — PATIENT MESSAGE (OUTPATIENT)
Dept: SURGERY | Facility: CLINIC | Age: 60
End: 2025-04-25
Payer: COMMERCIAL

## 2025-04-28 NOTE — PRE-PROCEDURE INSTRUCTIONS
PreOp Instructions given:     -- Medication information (what to hold and what to take)   -- NPO guidelines as follows: (or as per your Surgeon)  Stop ALL solid food, gum, candy 8 hours before arrival time.  Stop all CLOUDY liquids: coffee with creamer, cloudy juices, 8 hours prior to arrival time.  The patient should be ENCOURAGED to drink carbohydrate-rich clear liquids (sports drinks, clear juices) until 2 hours prior to arrival time.  Stop clear liquids 2 hours prior to arrival time.  CLEAR liquids include water, black coffee NO creamer, clear oral rehydration drinks, clear sports drinks and clear fruit juices (no orange juice, no pulpy juices, no apple cider).   IF IN DOUBT, drink water instead.   NOTHING TO DRINK 2 hours before to surgery/procedure  time. If you are told to take medication on the morning of surgery, it may be taken with a sip of water.   -- *Arrival place and directions given*.  Time to be given the day before procedure by the Surgeon's Office   -- Bathe with antibacterial soap (dial or Hibiclens as instructed)  -- Don't wear any jewelry or valuables and not metals on skin or hair AM of surgery   -- No makeup or moisturizer to face   -- No perfume/cologne, powder, lotions, aftershave or deodorant     Pt's instructions given by surgeons office, verified with Pt that there were no further questions at this time. Pt verbalized understanding.           *If going to , see below:      Directions and Instructions for [facility]   At [facility], we have an outstanding team of physicians, anesthesiologists, CRNAs, Registered Nurses, Surgical Technologists, and other ancillary team members all focused on your surgical and procedural care.   Before Your Procedure:   The physician's office will call you with a specific arrival time and directions a day or two before your scheduled procedure. You may also receive these instructions through your [facility] portal.   Day  of Procedure:   Please be sure to arrive at the arrival time given or you may risk your surgery being delayed or canceled. The arrival time is earlier than your scheduled surgery or procedure time. In the winter months please dress warm and bring blankets for you or your child as the waiting room may be cold. If you have difficulty locating the facility, please give us a call at [phone].   Directions:   The [facility] is located on the 1st floor of the hospital building near the [place] entrance.   Parking:   You will park in the South Parking Garage (note location on map). [facility] opens at 5:00 a.m. and has a drop off area by the entrance.  parking is available starting at 7:00 a.m. Please see below for further  parking instructions.   Directions from the parking garage elevators   Blue [facility] Elevators: From the parking garage, take the blue Artem Wise elevators (located in the center of the parking garage) to the 1st floor of the garage. You will then take a right once off the elevators then another right to the outside of the parking garage. You will be across from the [facility]. You will walk down the sidewalk, pass the  curve at the [place] entrance and continue to follow the sidewalk. You will pass the radiation oncology entrance on your right. Continue to follow the sidewalk to the [facility] glass door entrance.   Hospital Entrance (Inside Route): If a mostly inside route is preferred: Take the inside elevator bank (located at the far north end of the garage) from the parking garage to the 1st floor. On the 1st floor walk past [employer]. Keep walking down the center of the hallway towards the hospital elevators. Once you reach the red brick alpesh, take a left and go past the hospital elevators. Take another left and follow the blue and white Artem Wise signs around the hallway to the end. Go outside of the door.  You will see the [facility] entrance to your right.   Drop Off:   There is a drop off area at the doors of the [facility] for your convenience. If utilized for pediatric patients, an adult must accompany the patient into the surgery center while another adult grossman the vehicle.    (at 7:00 a.m.):   Upon check-in, please let the  know that you are utilizing [facility] parking which is free. The . will then call [employer] for your car to be picked up. Your keys and phone number will be collected and given to [employer] services. You will then be given a ticket. Upon discharge, [employer] will be notified to bring your vehicle back when you are ready.           Directions to [facility]:  [phone]     From 1st floor garage elevators: go past [facility] [employer] shop. Look for Black piano on side of coffee shop. Take Atrium (gold) elevator by piano up to 2nd floor. When you exit elevator follow long hallway (you should see a sign hanging from the ceiling that says Day of Surgery Family Waiting Room. When hallway ends you will be entering the day of surgery waiting area. Check in at the desk for your procedure.      From [facility] entrance: Make a right after you enter the door to the hospital. On your Left, take Concourse elevator to 2nd floor. Check in at desk      From Lab desk on 2nd floor: Exit lab area toward hospital atrium. You should see a sign that says Day of Surgery [facility] Waiting Area. Make a Left and follow long hallway (you should see a sign hanging from the ceiling that says Day of Surgery [facility] Waiting Room. When hallway ends you will be entering the day of surgery waiting area. Check in at the desk for your procedure.

## 2025-04-29 ENCOUNTER — ANESTHESIA EVENT (OUTPATIENT)
Dept: SURGERY | Facility: HOSPITAL | Age: 60
End: 2025-04-29
Payer: COMMERCIAL

## 2025-04-30 ENCOUNTER — ANESTHESIA (OUTPATIENT)
Dept: SURGERY | Facility: HOSPITAL | Age: 60
End: 2025-04-30
Payer: COMMERCIAL

## 2025-04-30 ENCOUNTER — HOSPITAL ENCOUNTER (INPATIENT)
Facility: HOSPITAL | Age: 60
LOS: 1 days | Discharge: HOME | DRG: 336 | End: 2025-05-01
Attending: COLON & RECTAL SURGERY | Admitting: COLON & RECTAL SURGERY
Payer: COMMERCIAL

## 2025-04-30 DIAGNOSIS — K43.5 PARASTOMAL HERNIA: ICD-10-CM

## 2025-04-30 DIAGNOSIS — K43.5 PARASTOMAL HERNIA WITHOUT OBSTRUCTION OR GANGRENE: Primary | ICD-10-CM

## 2025-04-30 LAB
ERYTHROCYTE [DISTWIDTH] IN BLOOD BY AUTOMATED COUNT: 22.2 % (ref 11.5–14.5)
HCT VFR BLD AUTO: 31.6 % (ref 37–48.5)
HGB BLD-MCNC: 8.5 GM/DL (ref 12–16)
INDIRECT COOMBS: NORMAL
MCH RBC QN AUTO: 19.5 PG (ref 27–31)
MCHC RBC AUTO-ENTMCNC: 26.9 G/DL (ref 32–36)
MCV RBC AUTO: 73 FL (ref 82–98)
PLATELET # BLD AUTO: 175 K/UL (ref 150–450)
PMV BLD AUTO: 8.7 FL (ref 9.2–12.9)
POCT GLUCOSE: 134 MG/DL (ref 70–110)
POCT GLUCOSE: 139 MG/DL (ref 70–110)
RBC # BLD AUTO: 4.36 M/UL (ref 4–5.4)
RH BLD: NORMAL
SPECIMEN OUTDATE: NORMAL
WBC # BLD AUTO: 8.73 K/UL (ref 3.9–12.7)

## 2025-04-30 PROCEDURE — 63600175 PHARM REV CODE 636 W HCPCS: Performed by: STUDENT IN AN ORGANIZED HEALTH CARE EDUCATION/TRAINING PROGRAM

## 2025-04-30 PROCEDURE — 63600175 PHARM REV CODE 636 W HCPCS: Performed by: NURSE PRACTITIONER

## 2025-04-30 PROCEDURE — 37000008 HC ANESTHESIA 1ST 15 MINUTES: Performed by: COLON & RECTAL SURGERY

## 2025-04-30 PROCEDURE — 71000016 HC POSTOP RECOV ADDL HR: Performed by: COLON & RECTAL SURGERY

## 2025-04-30 PROCEDURE — 94761 N-INVAS EAR/PLS OXIMETRY MLT: CPT

## 2025-04-30 PROCEDURE — 8E0W4CZ ROBOTIC ASSISTED PROCEDURE OF TRUNK REGION, PERCUTANEOUS ENDOSCOPIC APPROACH: ICD-10-PCS | Performed by: COLON & RECTAL SURGERY

## 2025-04-30 PROCEDURE — 86901 BLOOD TYPING SEROLOGIC RH(D): CPT | Performed by: NURSE PRACTITIONER

## 2025-04-30 PROCEDURE — 27201423 OPTIME MED/SURG SUP & DEVICES STERILE SUPPLY: Performed by: COLON & RECTAL SURGERY

## 2025-04-30 PROCEDURE — C1781 MESH (IMPLANTABLE): HCPCS | Performed by: COLON & RECTAL SURGERY

## 2025-04-30 PROCEDURE — 37000009 HC ANESTHESIA EA ADD 15 MINS: Performed by: COLON & RECTAL SURGERY

## 2025-04-30 PROCEDURE — 82962 GLUCOSE BLOOD TEST: CPT | Performed by: COLON & RECTAL SURGERY

## 2025-04-30 PROCEDURE — 71000033 HC RECOVERY, INTIAL HOUR: Performed by: COLON & RECTAL SURGERY

## 2025-04-30 PROCEDURE — 20600001 HC STEP DOWN PRIVATE ROOM

## 2025-04-30 PROCEDURE — 27000221 HC OXYGEN, UP TO 24 HOURS

## 2025-04-30 PROCEDURE — 99900035 HC TECH TIME PER 15 MIN (STAT)

## 2025-04-30 PROCEDURE — 71000015 HC POSTOP RECOV 1ST HR: Performed by: COLON & RECTAL SURGERY

## 2025-04-30 PROCEDURE — 36000711: Performed by: COLON & RECTAL SURGERY

## 2025-04-30 PROCEDURE — 25000003 PHARM REV CODE 250: Performed by: NURSE PRACTITIONER

## 2025-04-30 PROCEDURE — 0DJD8ZZ INSPECTION OF LOWER INTESTINAL TRACT, VIA NATURAL OR ARTIFICIAL OPENING ENDOSCOPIC: ICD-10-PCS | Performed by: COLON & RECTAL SURGERY

## 2025-04-30 PROCEDURE — 44388 COLONOSCOPY THRU STOMA SPX: CPT | Mod: XU,,, | Performed by: COLON & RECTAL SURGERY

## 2025-04-30 PROCEDURE — 0WUF4JZ SUPPLEMENT ABDOMINAL WALL WITH SYNTHETIC SUBSTITUTE, PERCUTANEOUS ENDOSCOPIC APPROACH: ICD-10-PCS | Performed by: COLON & RECTAL SURGERY

## 2025-04-30 PROCEDURE — 85027 COMPLETE CBC AUTOMATED: CPT | Performed by: UROLOGY

## 2025-04-30 PROCEDURE — 25000003 PHARM REV CODE 250: Performed by: STUDENT IN AN ORGANIZED HEALTH CARE EDUCATION/TRAINING PROGRAM

## 2025-04-30 PROCEDURE — 0DNW4ZZ RELEASE PERITONEUM, PERCUTANEOUS ENDOSCOPIC APPROACH: ICD-10-PCS | Performed by: COLON & RECTAL SURGERY

## 2025-04-30 PROCEDURE — 63600175 PHARM REV CODE 636 W HCPCS: Performed by: COLON & RECTAL SURGERY

## 2025-04-30 PROCEDURE — 36000710: Performed by: COLON & RECTAL SURGERY

## 2025-04-30 PROCEDURE — 49621 RPR PARASTOMAL HERNIA RDC: CPT | Mod: 22,,, | Performed by: COLON & RECTAL SURGERY

## 2025-04-30 PROCEDURE — 63600175 PHARM REV CODE 636 W HCPCS: Performed by: UROLOGY

## 2025-04-30 PROCEDURE — 25000003 PHARM REV CODE 250: Performed by: UROLOGY

## 2025-04-30 DEVICE — VENTRALIGHT ST MESH, 6" X 8" (15.2 CM X 20.3 CM), ELLIPSE
Type: IMPLANTABLE DEVICE | Site: ABDOMEN | Status: FUNCTIONAL
Brand: VENTRALIGHT

## 2025-04-30 RX ORDER — IBUPROFEN 400 MG/1
800 TABLET ORAL EVERY 8 HOURS
Status: DISCONTINUED | OUTPATIENT
Start: 2025-05-01 | End: 2025-05-01 | Stop reason: HOSPADM

## 2025-04-30 RX ORDER — ONDANSETRON HYDROCHLORIDE 2 MG/ML
INJECTION, SOLUTION INTRAVENOUS
Status: DISCONTINUED | OUTPATIENT
Start: 2025-04-30 | End: 2025-04-30

## 2025-04-30 RX ORDER — ACETAMINOPHEN 650 MG/20.3ML
975 LIQUID ORAL
Status: COMPLETED | OUTPATIENT
Start: 2025-04-30 | End: 2025-04-30

## 2025-04-30 RX ORDER — PHENYLEPHRINE HYDROCHLORIDE 10 MG/ML
INJECTION INTRAVENOUS
Status: DISCONTINUED | OUTPATIENT
Start: 2025-04-30 | End: 2025-04-30

## 2025-04-30 RX ORDER — ACETAMINOPHEN 10 MG/ML
1000 INJECTION, SOLUTION INTRAVENOUS EVERY 8 HOURS
Status: COMPLETED | OUTPATIENT
Start: 2025-04-30 | End: 2025-05-01

## 2025-04-30 RX ORDER — METRONIDAZOLE 500 MG/100ML
500 INJECTION, SOLUTION INTRAVENOUS
Status: COMPLETED | OUTPATIENT
Start: 2025-04-30 | End: 2025-04-30

## 2025-04-30 RX ORDER — SODIUM CHLORIDE 9 MG/ML
INJECTION, SOLUTION INTRAVENOUS CONTINUOUS
Status: DISCONTINUED | OUTPATIENT
Start: 2025-04-30 | End: 2025-04-30

## 2025-04-30 RX ORDER — MIDAZOLAM HYDROCHLORIDE 1 MG/ML
INJECTION INTRAMUSCULAR; INTRAVENOUS
Status: DISCONTINUED | OUTPATIENT
Start: 2025-04-30 | End: 2025-04-30

## 2025-04-30 RX ORDER — ROCURONIUM BROMIDE 10 MG/ML
INJECTION, SOLUTION INTRAVENOUS
Status: DISCONTINUED | OUTPATIENT
Start: 2025-04-30 | End: 2025-04-30

## 2025-04-30 RX ORDER — HEPARIN SODIUM 5000 [USP'U]/ML
5000 INJECTION, SOLUTION INTRAVENOUS; SUBCUTANEOUS ONCE
Status: DISCONTINUED | OUTPATIENT
Start: 2025-04-30 | End: 2025-04-30 | Stop reason: HOSPADM

## 2025-04-30 RX ORDER — HYDROMORPHONE HYDROCHLORIDE 1 MG/ML
0.2 INJECTION, SOLUTION INTRAMUSCULAR; INTRAVENOUS; SUBCUTANEOUS EVERY 5 MIN PRN
Status: DISCONTINUED | OUTPATIENT
Start: 2025-04-30 | End: 2025-04-30 | Stop reason: HOSPADM

## 2025-04-30 RX ORDER — TRAMADOL HYDROCHLORIDE 50 MG/1
50 TABLET ORAL EVERY 6 HOURS PRN
Status: DISCONTINUED | OUTPATIENT
Start: 2025-04-30 | End: 2025-05-01 | Stop reason: HOSPADM

## 2025-04-30 RX ORDER — LIDOCAINE HYDROCHLORIDE 10 MG/ML
1 INJECTION, SOLUTION EPIDURAL; INFILTRATION; INTRACAUDAL; PERINEURAL
Status: DISCONTINUED | OUTPATIENT
Start: 2025-04-30 | End: 2025-04-30 | Stop reason: HOSPADM

## 2025-04-30 RX ORDER — GABAPENTIN 300 MG/1
300 CAPSULE ORAL 3 TIMES DAILY
Status: DISCONTINUED | OUTPATIENT
Start: 2025-04-30 | End: 2025-04-30 | Stop reason: SDUPTHER

## 2025-04-30 RX ORDER — ACYCLOVIR 200 MG/1
400 CAPSULE ORAL 2 TIMES DAILY
Status: DISCONTINUED | OUTPATIENT
Start: 2025-04-30 | End: 2025-05-01 | Stop reason: HOSPADM

## 2025-04-30 RX ORDER — CALCIUM CHLORIDE DIHYDRATE 100 MG/ML
INJECTION, SOLUTION INTRAVENOUS
Status: DISCONTINUED | OUTPATIENT
Start: 2025-04-30 | End: 2025-04-30

## 2025-04-30 RX ORDER — HYDROMORPHONE HYDROCHLORIDE 2 MG/ML
0.5 INJECTION, SOLUTION INTRAMUSCULAR; INTRAVENOUS; SUBCUTANEOUS EVERY 6 HOURS PRN
Status: DISCONTINUED | OUTPATIENT
Start: 2025-04-30 | End: 2025-05-01 | Stop reason: HOSPADM

## 2025-04-30 RX ORDER — PROCHLORPERAZINE EDISYLATE 5 MG/ML
5 INJECTION INTRAMUSCULAR; INTRAVENOUS EVERY 30 MIN PRN
Status: DISCONTINUED | OUTPATIENT
Start: 2025-04-30 | End: 2025-04-30 | Stop reason: HOSPADM

## 2025-04-30 RX ORDER — METOPROLOL SUCCINATE 100 MG/1
100 TABLET, EXTENDED RELEASE ORAL NIGHTLY
Status: DISCONTINUED | OUTPATIENT
Start: 2025-04-30 | End: 2025-05-01 | Stop reason: HOSPADM

## 2025-04-30 RX ORDER — MUPIROCIN 20 MG/G
1 OINTMENT TOPICAL
Status: COMPLETED | OUTPATIENT
Start: 2025-04-30 | End: 2025-04-30

## 2025-04-30 RX ORDER — LIDOCAINE HYDROCHLORIDE 20 MG/ML
INJECTION INTRAVENOUS
Status: DISCONTINUED | OUTPATIENT
Start: 2025-04-30 | End: 2025-04-30

## 2025-04-30 RX ORDER — ONDANSETRON HYDROCHLORIDE 2 MG/ML
4 INJECTION, SOLUTION INTRAVENOUS EVERY 6 HOURS PRN
Status: DISCONTINUED | OUTPATIENT
Start: 2025-04-30 | End: 2025-05-01 | Stop reason: HOSPADM

## 2025-04-30 RX ORDER — ACETAMINOPHEN 500 MG
1000 TABLET ORAL EVERY 8 HOURS
Status: DISCONTINUED | OUTPATIENT
Start: 2025-05-01 | End: 2025-05-01 | Stop reason: HOSPADM

## 2025-04-30 RX ORDER — OXYCODONE HYDROCHLORIDE 5 MG/1
5 TABLET ORAL
Status: DISCONTINUED | OUTPATIENT
Start: 2025-04-30 | End: 2025-04-30 | Stop reason: HOSPADM

## 2025-04-30 RX ORDER — VECURONIUM BROMIDE 1 MG/ML
INJECTION, POWDER, LYOPHILIZED, FOR SOLUTION INTRAVENOUS
Status: DISCONTINUED | OUTPATIENT
Start: 2025-04-30 | End: 2025-04-30

## 2025-04-30 RX ORDER — GABAPENTIN 100 MG/1
100 CAPSULE ORAL 2 TIMES DAILY
Status: DISCONTINUED | OUTPATIENT
Start: 2025-04-30 | End: 2025-05-01 | Stop reason: HOSPADM

## 2025-04-30 RX ORDER — AMLODIPINE BESYLATE 5 MG/1
5 TABLET ORAL DAILY
Status: DISCONTINUED | OUTPATIENT
Start: 2025-05-01 | End: 2025-05-01 | Stop reason: HOSPADM

## 2025-04-30 RX ORDER — OXYCODONE HYDROCHLORIDE 5 MG/1
5 TABLET ORAL EVERY 4 HOURS PRN
Status: DISCONTINUED | OUTPATIENT
Start: 2025-04-30 | End: 2025-05-01 | Stop reason: HOSPADM

## 2025-04-30 RX ORDER — LEVOTHYROXINE SODIUM 50 UG/1
50 TABLET ORAL
Status: DISCONTINUED | OUTPATIENT
Start: 2025-05-01 | End: 2025-05-01 | Stop reason: HOSPADM

## 2025-04-30 RX ORDER — SODIUM CHLORIDE 9 MG/ML
INJECTION, SOLUTION INTRAVENOUS CONTINUOUS
Status: ACTIVE | OUTPATIENT
Start: 2025-04-30 | End: 2025-05-01

## 2025-04-30 RX ORDER — ESCITALOPRAM OXALATE 10 MG/1
10 TABLET ORAL NIGHTLY
Status: DISCONTINUED | OUTPATIENT
Start: 2025-04-30 | End: 2025-05-01 | Stop reason: HOSPADM

## 2025-04-30 RX ORDER — SODIUM CHLORIDE 9 MG/ML
INJECTION, SOLUTION INTRAVENOUS CONTINUOUS PRN
Status: DISCONTINUED | OUTPATIENT
Start: 2025-04-30 | End: 2025-04-30

## 2025-04-30 RX ORDER — MUPIROCIN 20 MG/G
OINTMENT TOPICAL 2 TIMES DAILY
Status: DISCONTINUED | OUTPATIENT
Start: 2025-04-30 | End: 2025-05-01 | Stop reason: HOSPADM

## 2025-04-30 RX ORDER — HYDROMORPHONE HYDROCHLORIDE 2 MG/ML
INJECTION, SOLUTION INTRAMUSCULAR; INTRAVENOUS; SUBCUTANEOUS
Status: DISCONTINUED | OUTPATIENT
Start: 2025-04-30 | End: 2025-04-30

## 2025-04-30 RX ORDER — SODIUM CHLORIDE 0.9 % (FLUSH) 0.9 %
10 SYRINGE (ML) INJECTION
Status: DISCONTINUED | OUTPATIENT
Start: 2025-04-30 | End: 2025-05-01 | Stop reason: HOSPADM

## 2025-04-30 RX ORDER — GABAPENTIN 300 MG/1
300 CAPSULE ORAL
Status: DISCONTINUED | OUTPATIENT
Start: 2025-04-30 | End: 2025-04-30 | Stop reason: HOSPADM

## 2025-04-30 RX ORDER — DEXAMETHASONE SODIUM PHOSPHATE 4 MG/ML
INJECTION, SOLUTION INTRA-ARTICULAR; INTRALESIONAL; INTRAMUSCULAR; INTRAVENOUS; SOFT TISSUE
Status: DISCONTINUED | OUTPATIENT
Start: 2025-04-30 | End: 2025-04-30

## 2025-04-30 RX ORDER — SODIUM CHLORIDE 9 MG/ML
INJECTION, SOLUTION INTRAVENOUS
Status: DISCONTINUED | OUTPATIENT
Start: 2025-04-30 | End: 2025-04-30 | Stop reason: HOSPADM

## 2025-04-30 RX ORDER — TRIPROLIDINE/PSEUDOEPHEDRINE 2.5MG-60MG
600 TABLET ORAL
Status: COMPLETED | OUTPATIENT
Start: 2025-04-30 | End: 2025-04-30

## 2025-04-30 RX ORDER — BUPIVACAINE HYDROCHLORIDE 2.5 MG/ML
INJECTION, SOLUTION EPIDURAL; INFILTRATION; INTRACAUDAL; PERINEURAL
Status: DISCONTINUED | OUTPATIENT
Start: 2025-04-30 | End: 2025-04-30 | Stop reason: HOSPADM

## 2025-04-30 RX ORDER — KETAMINE HCL IN 0.9 % NACL 50 MG/5 ML
SYRINGE (ML) INTRAVENOUS
Status: DISCONTINUED | OUTPATIENT
Start: 2025-04-30 | End: 2025-04-30

## 2025-04-30 RX ORDER — LEVOTHYROXINE SODIUM 25 UG/1
25 TABLET ORAL
Status: DISCONTINUED | OUTPATIENT
Start: 2025-05-01 | End: 2025-04-30

## 2025-04-30 RX ORDER — GLUCAGON 1 MG
1 KIT INJECTION
Status: DISCONTINUED | OUTPATIENT
Start: 2025-04-30 | End: 2025-04-30 | Stop reason: HOSPADM

## 2025-04-30 RX ORDER — OXYCODONE HYDROCHLORIDE 10 MG/1
10 TABLET ORAL EVERY 4 HOURS PRN
Status: DISCONTINUED | OUTPATIENT
Start: 2025-04-30 | End: 2025-05-01 | Stop reason: HOSPADM

## 2025-04-30 RX ORDER — DEXMEDETOMIDINE HYDROCHLORIDE 100 UG/ML
INJECTION, SOLUTION INTRAVENOUS
Status: DISCONTINUED | OUTPATIENT
Start: 2025-04-30 | End: 2025-04-30

## 2025-04-30 RX ORDER — CEFTRIAXONE 2 G/1
2 INJECTION, POWDER, FOR SOLUTION INTRAMUSCULAR; INTRAVENOUS
Status: COMPLETED | OUTPATIENT
Start: 2025-04-30 | End: 2025-04-30

## 2025-04-30 RX ORDER — PROPOFOL 10 MG/ML
VIAL (ML) INTRAVENOUS
Status: DISCONTINUED | OUTPATIENT
Start: 2025-04-30 | End: 2025-04-30

## 2025-04-30 RX ADMIN — SODIUM CHLORIDE: 9 INJECTION, SOLUTION INTRAVENOUS at 01:04

## 2025-04-30 RX ADMIN — ACETAMINOPHEN 1000 MG: 10 INJECTION, SOLUTION INTRAVENOUS at 10:04

## 2025-04-30 RX ADMIN — SODIUM CHLORIDE: 0.9 INJECTION, SOLUTION INTRAVENOUS at 09:04

## 2025-04-30 RX ADMIN — ONDANSETRON 1 G: 2 INJECTION INTRAMUSCULAR; INTRAVENOUS at 10:04

## 2025-04-30 RX ADMIN — ACETAMINOPHEN 976.6 MG: 650 SOLUTION ORAL at 09:04

## 2025-04-30 RX ADMIN — ESCITALOPRAM OXALATE 10 MG: 10 TABLET ORAL at 08:04

## 2025-04-30 RX ADMIN — DEXMEDETOMIDINE 12 MCG: 100 INJECTION, SOLUTION, CONCENTRATE INTRAVENOUS at 12:04

## 2025-04-30 RX ADMIN — ACETAMINOPHEN 1000 MG: 10 INJECTION, SOLUTION INTRAVENOUS at 02:04

## 2025-04-30 RX ADMIN — METRONIDAZOLE 500 MG: 5 INJECTION, SOLUTION INTRAVENOUS at 09:04

## 2025-04-30 RX ADMIN — VECURONIUM BROMIDE 5 MG: 10 INJECTION, POWDER, LYOPHILIZED, FOR SOLUTION INTRAVENOUS at 10:04

## 2025-04-30 RX ADMIN — DEXMEDETOMIDINE 12 MCG: 100 INJECTION, SOLUTION, CONCENTRATE INTRAVENOUS at 09:04

## 2025-04-30 RX ADMIN — IBUPROFEN 600 MG: 100 SUSPENSION ORAL at 09:04

## 2025-04-30 RX ADMIN — MIDAZOLAM HYDROCHLORIDE 2 MG: 1 INJECTION, SOLUTION INTRAMUSCULAR; INTRAVENOUS at 09:04

## 2025-04-30 RX ADMIN — ACYCLOVIR 400 MG: 200 CAPSULE ORAL at 08:04

## 2025-04-30 RX ADMIN — DEXAMETHASONE SODIUM PHOSPHATE 8 MG: 4 INJECTION, SOLUTION INTRAMUSCULAR; INTRAVENOUS at 09:04

## 2025-04-30 RX ADMIN — TRAMADOL HYDROCHLORIDE 50 MG: 50 TABLET, COATED ORAL at 07:04

## 2025-04-30 RX ADMIN — Medication 10 MG: at 11:04

## 2025-04-30 RX ADMIN — IBUPROFEN 800 MG: 800 INJECTION INTRAVENOUS at 02:04

## 2025-04-30 RX ADMIN — HYDROMORPHONE HYDROCHLORIDE 0.2 MG: 2 INJECTION INTRAMUSCULAR; INTRAVENOUS; SUBCUTANEOUS at 11:04

## 2025-04-30 RX ADMIN — MUPIROCIN 1 G: 20 OINTMENT TOPICAL at 09:04

## 2025-04-30 RX ADMIN — HYDROMORPHONE HYDROCHLORIDE 0.3 MG: 2 INJECTION INTRAMUSCULAR; INTRAVENOUS; SUBCUTANEOUS at 12:04

## 2025-04-30 RX ADMIN — LIDOCAINE HYDROCHLORIDE 100 MG: 20 INJECTION INTRAVENOUS at 09:04

## 2025-04-30 RX ADMIN — PHENYLEPHRINE HYDROCHLORIDE 200 MCG: 10 INJECTION INTRAVENOUS at 10:04

## 2025-04-30 RX ADMIN — GABAPENTIN 100 MG: 100 CAPSULE ORAL at 08:04

## 2025-04-30 RX ADMIN — METOPROLOL SUCCINATE 100 MG: 100 TABLET, EXTENDED RELEASE ORAL at 08:04

## 2025-04-30 RX ADMIN — Medication 10 MG: at 10:04

## 2025-04-30 RX ADMIN — HYDROMORPHONE HYDROCHLORIDE 0.5 MG: 2 INJECTION INTRAMUSCULAR; INTRAVENOUS; SUBCUTANEOUS at 09:04

## 2025-04-30 RX ADMIN — PHENYLEPHRINE HYDROCHLORIDE 200 MCG: 10 INJECTION INTRAVENOUS at 09:04

## 2025-04-30 RX ADMIN — PROPOFOL 100 MG: 10 INJECTION, EMULSION INTRAVENOUS at 09:04

## 2025-04-30 RX ADMIN — OXYCODONE 5 MG: 5 TABLET ORAL at 04:04

## 2025-04-30 RX ADMIN — VECURONIUM BROMIDE 2 MG: 10 INJECTION, POWDER, LYOPHILIZED, FOR SOLUTION INTRAVENOUS at 11:04

## 2025-04-30 RX ADMIN — PHENYLEPHRINE HYDROCHLORIDE 100 MCG: 10 INJECTION INTRAVENOUS at 11:04

## 2025-04-30 RX ADMIN — SUGAMMADEX 400 MG: 100 INJECTION, SOLUTION INTRAVENOUS at 12:04

## 2025-04-30 RX ADMIN — MUPIROCIN: 20 OINTMENT TOPICAL at 09:04

## 2025-04-30 RX ADMIN — ROCURONIUM BROMIDE 100 MG: 10 INJECTION, SOLUTION INTRAVENOUS at 09:04

## 2025-04-30 RX ADMIN — IBUPROFEN 800 MG: 800 INJECTION INTRAVENOUS at 09:04

## 2025-04-30 RX ADMIN — PHENYLEPHRINE HYDROCHLORIDE 100 MCG: 10 INJECTION INTRAVENOUS at 09:04

## 2025-04-30 RX ADMIN — OXYCODONE HYDROCHLORIDE 10 MG: 10 TABLET ORAL at 08:04

## 2025-04-30 RX ADMIN — Medication 30 MG: at 09:04

## 2025-04-30 RX ADMIN — ONDANSETRON 4 MG: 2 INJECTION INTRAMUSCULAR; INTRAVENOUS at 12:04

## 2025-04-30 RX ADMIN — CEFTRIAXONE SODIUM 2 G: 2 INJECTION, POWDER, FOR SOLUTION INTRAMUSCULAR; INTRAVENOUS at 09:04

## 2025-04-30 NOTE — NURSING TRANSFER
Nursing Transfer Note      4/30/2025   1:44 PM    Nurse giving handoff:Behzad MOSQUEDA  PACU  Nurse receiving handoff: Edison CLAY    Reason patient is being transferred: recovered from anesthesia    Transfer To: 1006    Transfer via bed    Transfer with IV pump/fluid, colostomy    Transported by RN x2      Additional Lines: Lentz Catheter    Medicines sent: none    Any special needs or follow-up needed: routine    Patient belongings transferred with patient: No    Chart send with patient: Yes    Notified: family via surgical texting system     Patient reassessed at: 4/30/2025 at 1600  1  Upon arrival to floor: cardiac monitor applied, patient oriented to room, and bed in lowest position

## 2025-04-30 NOTE — ANESTHESIA PROCEDURE NOTES
Intubation    Date/Time: 4/30/2025 9:48 AM    Performed by: Juarez Wang MD  Authorized by: Juarez Wang MD    Intubation:     Induction:  Intravenous    Intubated:  Postinduction    Mask Ventilation:  Easy with oral airway    Attempts:  1    Attempted By:  Staff anesthesiologist    Method of Intubation:  Video laryngoscopy    Blade:  Carrizales 3    Laryngeal View Grade: Grade I - full view of cords      Difficult Airway Encountered?: No      Complications:  None    Airway Device:  Oral endotracheal tube    Airway Device Size:  7.5    Style/Cuff Inflation:  Cuffed    Inflation Amount (mL):  8    Tube secured:  22    Secured at:  The teeth    Placement Verified By:  Capnometry, Revisualization with laryngoscopy and Colorimetric ETCO2 device    Complicating Factors:  Anterior larynx, short neck, obesity, poor neck/head extension and oropharyngeal edema or fat (s/p ACDF)    Findings Post-Intubation:  BS equal bilateral

## 2025-04-30 NOTE — H&P
"H&P reviewed. Last chemo ~4 weeks ago, no avastin for months. No changes.  ----    CRS Office Note     Referring Md:   No referring provider defined for this encounter.     SUBJECTIVE:      Chief Complaint: rectal cancer     History of Present Illness:     Course is as follows:  Patient is a 59 y.o. female presents with locally advanced rectal cancer.      11/27/19:   8/28/20:  Colonoscopy revealed a single sessile 1.4 cm nonbleeding polyp in the mid rectum at 8 cm from the anus. There was an infiltrative, ulcerated and fungating mass at the distal rectum, measuring 3 cm, causing partial obstruction. This was her first colonoscopy  Pathology: Polyp showed tubular adenoma with focal HG dysplasia. Distal rectal mass biopsy c/w adenocarcinoma with ulceration"              - MMR normal/proficient.     Staging:  - CEA is 31.3  - 9/15/2020 CT C/A/P  4mm R lung nodule and additional region of somewhat nodular atelectasis in the LLL up to 5mm.  2 cm ill-defined region in posterior R lobe of liver present on prior CT imaging 2017. 3.5 cm regional fatty sparing in posteroinferior most aspect of the R lobe of liver. No significant lymph node enlargement in upper abdomen. Few stable lymph nodes in central mesentery unchanged from 2017.  Impression, no CT evidence for metastatic disease.   - 9/29/2020 MRI pelvis  Low stenotic, partially circumferential rectal carcinoma measuring 3.6 cm in length with max thickness 1.7 cm. Distal portion of the tumor from the anal verge measured approx 3.2 cm with partial involvement of the muscularis propria. No evidence of fat plane separation within the posterior wall of the vagina. Largest R presacral node measuring approx 1 x 0.7 cm, another 7 x 5 mm, another 8 x 5 mm.  Uterus 6 x 2.9 x 5.6 cm with 2.1 cm fibroid.  In the posterior wall of the vagina is a mass measuring approx 4.8 x 2.2 x 3.2 cm without connection with the rectal mass. Differential includes vaginal leiomyoma but cannot rule " out vaginal carcinoma or LMS.     10/12/20:  EUA with biopsies --> Locally invasive rectal cancer.  Digital exam demonstrated tumor in the posterior vaginal wall.  Anteriorly was clear.  Lateral sidewalls felt clear on the anterior aspect of the lateral sidewall.  Digital exam of the anus demonstrated large near circumferential rectal mass mostly located anteriorly and extending onto the patient's left side.  Felt tethered to the left levators.  Mass was continuous with the posterior vaginal mass.  Mass was ulcerated in the central aspect anteriorly.  Pat on 10/14/2020 demonstrated hypermetabolic mass in the right lobe of the liver not well visualized on CT scan but appears to measure 3.5 cm.  Additional more inferior mass in right side of the liver also consistent with metastatic disease.  11/06/2020:  Liver biopsy consistent with metastatic colorectal adenocarcinoma.     Plan was for PATRICK.  She completed short course radiotherapy from 10/26/2020 to 10/30/2020.  FOLFOX was started on 11/17/2020.      Medical comorbidities include DM, HTN, hyperthyroidism and sleep apnea (CPAP). BMI 34     No prior abdominal surgeries.  She has had a prior anal fistula versus fissure surgery 20+ years ago.  At that time, she had a flexible sigmoidoscopy that was normal.     Functionally, she is active and performs all of her activities of daily living.  Nonsmoker.  No significant weight loss.  Historically, she had 3 bowel movements per day.  No fecal incontinence.     Family history for father - prostate cancer (dec). Mother with breast cancer (44, dec). MGM and PGM also with unknown cancer.     Tolerated short course XRT from 10/26/20 to 10/20/20.  Neoadjuvant FOLFOX started 11/17/2020. She got 3 cycles and stopped after 12/15/21.  Stopped for vaginal drainage with RV fistula.      01/13/2021:  Underwent laparoscopic loop sigmoid colostomy secondary to rectovaginal fistula     Transitioned for FOLFOXIRI on 2/3/21. She got 3 cycles.        4/6/2021: (Luciano Castaneda)  Robotic assisted segment 6/7 liver resection (right posterior hepatectomy)  Robotic assisted microwave ablation segment 8 lesion   Pathology:   Multifocal (2 foci) metastatic adenocarcinoma consistent with colonic origin,   2.6 cm and 1.3cm   Resection margin is POSITIVE for one of the lesion      8/16/2021:   1.  Abdominoperineal resection     2.  Posterior vaginectomy  3.  Parastomal hernia repair with revision of colostomy  4.  Unilateral external oblique release with myofascial advancement to allow for abdominal closure  5.  Total abdominal hysterectomy and bilateral salpingo oophorectomy performed by gyn Oncology  6.  Pedicled lap reconstruction for the pelvis performed Plastic surgery  Pathology:  1. Uterus and cervix, bilateral fallopian tubes and bilateral ovaries; total   hysterectomy , bilateral salpingectomies and bilateral oophorectomies   (54.5g):   Cervix:            Serosa:   Positive  for colorectal adenocarcinoma with signet ring cell features   Colorectal adenocarcinoma (6.0 cm):           Macroscopic evaluation of mesorectum:   Complete           Tumor site:  Rectum:   Entirely below anterior peritoneal reflection           Histologic type:   Adenocarcinoma with mucinous (10%) and signet ring cell (5%) features           Histologic type:  Moderately to poorly differentiated (G2-G3)           Tumor size:   6 cm           Tumor extent:   Directly invades or adherence to adjacent structures, vaginal wall and uterine serosa (T4b)          Macroscopic tumor perforation:   Present (rectovaginal fistula)           Lymphovascular invasion:  Present:   Small vessel           Perineural invasion:   Not identified           Treatment effect:   Present, with residual cancer showing evident tumor  regression, but more than single cells or rare small groups (partial  response, score 2)           Margins:                       Left vaginal wall  margin-positive  for carcinoma                        - Additional Left vaginal wall margin sent.  Tumor in part of the margin but not the entire margin.  Specimen with orientation.  Confirmed with pathologist that tumor only on 1 edge.  Therefore, final margins negative.           Regional lymph node status:   All examined lymph nodes are  negative  for metastatic carcinoma (0/12)     Current status:  9/14/21:  Doing very well.  Intermittent drainage from her perineum.  Currently wearing depends changing than 2-3 times per day.  No fevers or chills.  Pain well controlled.  Ostomy functioning well.  Eating.  Ambulating without assistance.  One drain remains.  Low output.  10/12/21:  Continues to do well.  Active.  No issues with her ostomy.  Decreased vaginal drainage.  Activity is improving.  10/10/23:   Presents for evaluation for parastomal hernia.  She was recently diagnosed with a pulmonary nodule consistent with metastatic disease.  She underwent SBRT.  She remains on adjuvant chemotherapy and is followed by Dr. Cayetano Setarns.   She presents for intermittent pain and swelling around her ostomy as well as increasing difficulty with pouching.  She has gained 20 lb over the past several months.  Regarding the ostomy, she has increased leaks as well as intermittent pain with bending over or movement.  10/22/24: she was recently admitted x 2 for stoma bleeding requiring transfusion.  No bleeding since she was last discharged.  She has a new ostomy appliance in place which is working well.  Regarding the hernia, she has pain with standing from a sitting position.  Otherwise, she is doing well.  She has been off chemotherapy for the past 6 weeks secondary to an upper respiratory tract infection but resumes FOLFIRI with Avastin later this week.  Repeat CT scan plan for this upcoming week.  Last PET scan from 2 months ago with ongoing retroperitoneal adenopathy consistent with known metastatic disease that is stable from prior exam.  3/18/2025:  She  presents for evaluation for increasing parastomal hernia causing tearing of the skin around the ostomy.  She had intermittently passes clot from the bleeding around the ostomy.  Denies any blood from the ostomy itself.  Hernia impacts her quality of life and prevents her activities.  She continues on chemotherapy.     Surveillance:   9/9/2022:  Surveillance colonoscopy performed.  - Impression:            - Widely patent end colostomy with healthy                          appearing mucosa in the descending colon.                          - The examined portion of the ileum was normal.                          - The descending colon, transverse colon and                          ascending colon are normal.                          - No specimens collected.      Review of Systems:  Review of Systems   Constitutional:  Negative for chills, diaphoresis, fever, malaise/fatigue and weight loss.   HENT:  Negative for congestion.    Respiratory:  Negative for shortness of breath.    Cardiovascular:  Negative for chest pain and leg swelling.   Gastrointestinal:  Negative for abdominal pain, blood in stool, constipation, diarrhea, nausea and vomiting.   Genitourinary:  Negative for dysuria.   Musculoskeletal:  Negative for back pain and myalgias.   Skin:  Negative for rash.   Neurological:  Negative for dizziness and weakness.   Endo/Heme/Allergies:  Does not bruise/bleed easily.   Psychiatric/Behavioral:  Negative for depression.       Past Medical History:   Diagnosis Date    Colon cancer Oc t 2020    rectal     Depression     Diabetes mellitus     pre diabetic    Hypertension     Postoperative hypothyroidism 10/06/2020    Rectal cancer 10/06/2020    Rectal cancer     Renal disorder     kidney stone    Sleep apnea     c pap machine in use    Thyroid disease     Tumor of lung        Past Surgical History:   Procedure Laterality Date    ABDOMINOPERINEAL RESECTION OF RECTUM N/A 8/16/2021    Procedure: PROCTECTOMY,  ABDOMINOPERINEAL;  Surgeon: RAHUL Jacobson MD;  [facility];  Service: Colon and Rectal;  Laterality: N/A;  ERAS high    COLONOSCOPY N/A 9/9/2022    Procedure: COLONOSCOPY through stoma;  Surgeon: RAHUL Jacobson MD;  Location: [facility] ENDO (4TH FLR);  Service: Endoscopy;  Laterality: N/A;  fully vacc to bring card  inst emailed and via portal  clear liquids 4hr prior-AM Prep-LW    CREATION OF MUSCLE ROTATIONAL FLAP  8/16/2021    Procedure: CREATION, FLAP, MUSCLE ROTATION;  Surgeon: RAHUL Jacobson MD;  Location: [facility] OR 2ND FLR;  Service: Colon and Rectal;;    CREATION OF MUSCLE ROTATIONAL FLAP N/A 8/16/2021    Procedure: CREATION, FLAP, MUSCLE ROTATION;  Surgeon: Nicholas Saez MD;  [facility];  Service: Plastics;  Laterality: N/A;  Vertical rectus abdominis flap    ESOPHAGOGASTRODUODENOSCOPY      EXAMINATION UNDER ANESTHESIA N/A 10/12/2020    Procedure: Exam under anesthesia, flex sig;  Surgeon: Blake Jacobson MD;  [facility];  Service: Endoscopy;  Laterality: N/A;  Rectal and vaginal areas    FISTULA REPAIR      anal fistula    FLAP PROCEDURE N/A 8/16/2021    Procedure: Fascia flap creation;  Surgeon: Nicholas Saez MD;  [facility];  Service: Plastics;  Laterality: N/A;    FLEXIBLE SIGMOIDOSCOPY  10/12/2020    Procedure: SIGMOIDOSCOPY, FLEXIBLE;  Surgeon: Blake Jacobson MD;  Location: [address];  Service: Endoscopy;;    FUSION OF CERVICAL SPINE BY POSTERIOR APPROACH      anterior and posterior    RADIOFREQUENCY ABLATION OF LIVER LESION  4/6/2021    Procedure: RADIOFREQUENCY ABLATION, LESION, LIVER Robotic of segment 8 ;  Surgeon: Luciano Castaneda MD;  [facility];  Service: General;;    REVISION OF COLOSTOMY WITH REPAIR OF PARACOLOSTOMY HERNIA N/A 8/16/2021    Procedure: REVISION, COLOSTOMY, WITH PARACOLOSTOMY HERNIA REPAIR;  Surgeon: RAHUL Jacobson MD;  [facility];   Service: Colon and Rectal;  Laterality: N/A;    ROBOT-ASSISTED LAPAROSCOPIC PARTIAL SURGICAL REMOVAL OF LIVER USING DA MARBELLA XI N/A 4/6/2021    Procedure: XI ROBOTIC RESECTION, LIVER - RIGHT POSTERIOR - NEED DROP IN BK PROBE;  Surgeon: Luciano Castaneda MD;  Location: [facility] OR [address] [place] [address];  Service: General;  Laterality: N/A;    TOTAL ABDOMINAL HYSTERECTOMY W/ BILATERAL SALPINGOOPHORECTOMY N/A 8/16/2021    Procedure: HYSTERECTOMY, TOTAL, ABDOMINAL, WITH BILATERAL SALPINGO-OOPHORECTOMY;  Surgeon: Alejo Mccullough MD;  [facility];  Service: OB/GYN;  Laterality: N/A;    VAGINA RECONSTRUCTION SURGERY N/A 8/16/2021    Procedure: RECONSTRUCTION, VAGINA;  Surgeon: Nicholas Saez MD;  Location: [facility] OR [address] [place] [address];  Service: Plastics;  Laterality: N/A;    VAGINECTOMY N/A 8/16/2021    Procedure: VAGINECTOMY;  Surgeon: RAHUL Jacobson MD;  Location: [facility] OR [address] [place] [address];  Service: Colon and Rectal;  Laterality: N/A;  Posterior       Family History   Problem Relation Name Age of Onset    Breast cancer Mother  44    Cancer Mother      Heart disease Father      Hyperlipidemia Father      Prostate cancer Father      Breast cancer Maternal Grandmother      Cancer Paternal Grandmother          ? female origin    Ovarian cancer Neg Hx      Uterine cancer Neg Hx      Colon cancer Neg Hx         Social History     Socioeconomic History    Marital status: Single   Tobacco Use    Smoking status: Never    Smokeless tobacco: Never   Substance and Sexual Activity    Alcohol use: Never    Drug use: Never    Sexual activity: Not Currently     Social Drivers of Health     Financial Resource Strain: Low Risk  (3/17/2025)    Overall Financial Resource Strain (CARDIA)     Difficulty of Paying Living Expenses: Not very hard   Food Insecurity: No Food Insecurity (3/17/2025)    Hunger Vital Sign     Worried About Running Out of Food in the Last Year: Never true     Ran Out of Food in the Last Year: Never true   Transportation Needs:  "No Transportation Needs (3/17/2025)    PRAPARE - Transportation     Lack of Transportation (Medical): No     Lack of Transportation (Non-Medical): No   Physical Activity: Inactive (3/17/2025)    Exercise Vital Sign     Days of Exercise per Week: 0 days     Minutes of Exercise per Session: 0 min   Stress: Stress Concern Present (3/17/2025)    [employer] - Occupational Stress Questionnaire     Feeling of Stress : Rather much   Housing Stability: Low Risk  (3/17/2025)    Housing Stability Vital Sign     Unable to Pay for Housing in the Last Year: No     Number of Times Moved in the Last Year: 0     Homeless in the Last Year: No       Current Medications[1]    Review of patient's allergies indicates:  No Known Allergies    OBJECTIVE:      Vital Signs (Most Recent)  /64 (BP Location: Left arm, Patient Position: Sitting)   Pulse 82   Ht 5' 4" (1.626 m)   Wt 100 kg (220 lb 7.4 oz)   BMI 37.84 kg/m²      Physical Exam:  General: White female in no distress   Neuro: alert and oriented x 4.  Moves all extremities.     HEENT: no icterus.  Trachea midline  Respiratory: respirations are even and unlabored  Cardiac: tachycardic  Abdomen:  Midline incision well healed.  Ostomy in the left mid abdomen is healthy and pink and protrudes above the level of the skin. Surrounding peristomal hernia is firm and unable to be completely reduced.  No bleeding.  Extremities: Warm dry and intact  Skin: no rashes  Anorectal:  Deferred today.  From prior exam: Perineal flap incision is healing very well.  Small amount of granulation tissue treated with silver nitrate.  Posterior vaginal wall intact and healing well.     Labs:   H&H 8 and 28.  Alb 4.1.  Normal renal function  CEA down to 4.2 from 30+     Imaging:   MRI 1/8/2021: Locally invasive low rectal carcinoma with anal sphincter and vaginal involvement with moderate response to treatment.  There is similar tumor extent to the 09/29/2020 exam, but " significantly decreased in size and improved signal characteristics, as above.  Development of colovaginal fistula noted.  Two suspicious perirectal lymph nodes, significantly improved.  CT C/A/P 01/08/2021:: multiple hypodense lesions in the right hepatic lobe, some of which appear new since the prior outside CT from 09/15/2020 and are potentially concerning for intrahepatic metastasis      CT abd pelvis 10/23/24 reviewed with 5 5 cm x 6.5 cm parastomal hernia.      ASSESSMENT/PLAN:      Meron was seen today for follow-up.     Diagnoses and all orders for this visit:     Parastomal hernia without obstruction or gangrene  -     Case Request Operating Room: XI ROBOTIC REPAIR, HERNIA, PARASTOMAL, REDUCIBLE, COLONOSCOPY  -     sodium,potassium,mag sulfates (SUPREP BOWEL PREP KIT) 17.5-3.13-1.6 gram SolR; Take 177 mLs by mouth once daily. for 2 days     History of rectal cancer  -     Case Request Operating Room: XI ROBOTIC REPAIR, HERNIA, PARASTOMAL, REDUCIBLE, COLONOSCOPY  -     sodium,potassium,mag sulfates (SUPREP BOWEL PREP KIT) 17.5-3.13-1.6 gram SolR; Take 177 mLs by mouth once daily. for 2 days            59 y.o. woman with locally advanced rectal cancer with multiple hepatic metastases.  She has received short course radiotherapy followed by FOLFOX chemotherapy.  As response to her treatment, her tumor has decreased in size and she has now developed a rectovaginal fistula.     With the RV fistula, she has stopped eating and is unable to tolerate chemotherapy.   Therefore, urgent fecal diversion was offered with a laparoscopic diverting loop sigmoid colostomy.  This was performed on 01/13/2021.  She then had 3 additional cycles of chemotherapy.     Liver resection was done on 4/6/21.  Positive margins.      Abdominal perineal resection with EN bloc posterior vaginectomy and pedicle flap reconstruction performed on 08/16/2021 for yp T4b N0 M1c rectal cancer.      She had pulmonary metastatic disease treated by  SBRT.  She now has retroperitoneal lymphadenopathy be controlled with FOLFIRI and Avastin.     She presents for evaluation for parastomal hernia causing tearing of the parastomal skin resulting in ongoing bleeding as well as pain and impacting her quality of life.  Hernia repair was therefore offered to her.  We discussed robotic parastomal hernia repair with reduction of the internal contents as well as stripping of the hernia sac and partial closure of the fascia followed by mesh onlay in a sugar Moon fashion.  We discussed the risks of damage to surrounding structures, recurrence, pain, bleeding, need for an open incision.  Prior to her procedure, we will plan for colonoscopy to look for any metachronous lesions.  Consents were signed today and all questions answered.        RAHUL Jacobson MD, FACS, FASCRS  Staff Surgeon  Colon & Rectal Surgery        CC: Cayetano Stearns MD       [1]   Current Facility-Administered Medications   Medication Dose Route Frequency Provider Last Rate Last Admin    0.9% NaCl infusion   Intravenous On Call Procedure Ioana Boston, JIM        acetaminophen oral solution 976.601 mg  976.601 mg Oral On Call Procedure Ioana Boston, JIM        cefTRIAXone injection 2 g  2 g Intravenous On Call Procedure Ioana Boston NP        And    metronidazole IVPB 500 mg  500 mg Intravenous On Call Procedure Ioana Boston, JIM        gabapentin capsule 300 mg  300 mg Oral On Call Procedure Ioana Boston NP        heparin (porcine) injection 5,000 Units  5,000 Units Subcutaneous Once Ioana Boston NP        ibuprofen 20 mg/mL oral liquid 600 mg  600 mg Oral On Call Procedure Ioana Boston, JIM        LIDOcaine (PF) 10 mg/ml (1%) injection 10 mg  1 mL Intradermal On Call Procedure Ioana Boston NP        mupirocin 2 % ointment 1 g  1 g Nasal On Call Procedure Ioana Boston, NP

## 2025-04-30 NOTE — TRANSFER OF CARE
"Anesthesia Transfer of Care Note    Patient: Meron Lamar    Procedure(s) Performed: Procedure(s) (LRB):  XI ROBOTIC REPAIR, HERNIA, PARASTOMAL, REDUCIBLE (N/A)  COLONOSCOPY (N/A)  XI ROBOTIC LYSIS, ADHESIONS (N/A)    Patient location: PACU    Anesthesia Type: general    Transport from OR: Transported from OR on 6-10 L/min O2 by face mask with adequate spontaneous ventilation    Post pain: adequate analgesia    Post assessment: tolerated procedure well and no apparent anesthetic complications    Post vital signs: stable    Level of consciousness: awake, alert and oriented    Nausea/Vomiting: no nausea/vomiting    Complications: none    Transfer of care protocol was followed      Last vitals: Visit Vitals  BP (!) 145/67 (BP Location: Right arm, Patient Position: Lying)   Pulse 71   Temp 36.7 °C (98.1 °F) (Temporal)   Resp 18   Ht 5' 4" (1.626 m)   Wt 97.3 kg (214 lb 7.1 oz)   SpO2 98%   Breastfeeding No   BMI 36.81 kg/m²     "

## 2025-04-30 NOTE — OP NOTE
MERON LAMAR  [id number]  [id number]    OPERATIVE NOTE:    DATE OF OPERATION: 04/30/2025    PREOPERATIVE DIAGNOSIS:  Parastomal hernia    POSTOPERATIVE DIAGNOSIS:  Parastomal hernia with multiple intra-abdominal adhesions    PROCEDURE PERFORMED:    Robotic assisted lysis of adhesions with parastomal hernia repair with 15 x 20 cm mesh underlay in sugar Moon fashion  2.   colonoscopy    ATTENDING SURGEON: RAHUL Jacobson MD    RESIDENT/FELLOW SURGEON: Jordyn Adams MD    ANESTHESIA:  General    ESTIMATED BLOOD LOSS:  20 mL    FINDINGS:  1. Multiple adhesions up to the anterior abdominal wall.  These were carefully taken down.  All adhesiolysis was performed sharply.  Once all of the omentum and small bowel were freed off the anterior abdominal wall, the descending colon could be well visualized going into a large paracolostomy hernia.  The fascial defect was partially closed down with a #1 V lock suture that was absorbable.  A 15 x 20 cm piece of Ventralex mesh was placed as an underlay in sugar Moon technique to lateralize the colostomy.  2. Colonoscopy was performed.  Adequate bowel prep.  No lesions seen throughout the remainder of the colon.  SPECIMENS:  None    COMPLICATIONS:  None    DISPOSITION: PACU    CONDITION: good    INDICATION FOR PROCEDURE:  Meron Lamar is a 59 y.o. female with a history of locally advanced rectal cancer status post abdominoperineal resection and posterior vaginectomy with VRAM flap reconstruction of the pelvis.  She presented with an enlarging parastomal hernia impacting her quality of life.    DESCRIPTION OF PROCEDURE:   After informed consent was reviewed, the patient was taken to the operating room and transferred to the OR table.  she was placed under general anesthesia.  A corral catheter was sterilely inserted.  Ceftriaxone and flagyl were given for preoperative antibiotics.    She is placed into the supine position.  The arms were appropriately padded and tucked.   The anterior abdominal wall was prepped and draped in the usual sterile fashion.  The abdomen was entered through a small incision in the left upper quadrant.  A Veress needle was inserted.  Pneumoperitoneum was achieved.  A 5 mm port and camera were inserted using the Optiview technique in the right upper quadrant.  Multiple adhesions were encountered.  An additional 8 mm trocar for the robot was placed just inferior on the right side.  Lysis of adhesions then ensued for roughly 60 minutes.  All adhesiolysis was performed sharply.  An additional 8 mm trocar was placed further inferiorly on the right side.  We then worked upwards to clear the omentum to allow placement of a 3rd robotic trocar in the right.  Once all of the trocars were placed, we then docked the robot.  Using the robot, all of the remainder adhesions to the anterior abdominal wall were taken down sharply.  The descending colon was then clearly visualized going up into a large parastomal hernia.  The fascial edges were cleaned.  A 12 in #1 absorbable V lock suture was then used to partially close the fascial defect.  The pressure in the abdomen was turned down to 8 mmHg  in order to allow closure.  The defect could not be fully closed.  There was plenty of room for the colostomy.  The colostomy was then evaluated and found to lateralized well.  A 15 x 20 cm piece of ventral light mesh was marked and inserted into the abdomen.  It was unfurled and oriented.  This was secured circumferentially to the posterior peritoneum and fascia using a running 2-0 V lock suture.  A channel was made on either side of the colostomy.  The mesh was circumferentially tacked as well as anteriorly.  The mesh sat in appropriate position and was under no tension.  30 mL of 0.25% Marcaine was injected laterally as a long-acting local anesthetic.  All ports were removed under direct visualization.  All skin incisions were closed with 4-0 Vicryl in subcuticular fashion and  sterile dressings were applied.  The ostomy was then opened and a colonoscopy was performed.  The colonoscope passed through the sugar Moon repair easily.  The colon prep was adequate.  There were no polyps or abnormalities seen on the colonoscopy.  The patient tolerated the procedure well.  There were no apparent complications.  All sponge, needle, and instruments counts were correct x 2.  she was then extubated and taken to PACU in stable condition.        RAHUL Jacobson MD, FACS, FASCRS  Staff Surgeon  Colon & Rectal Surgery

## 2025-04-30 NOTE — ANESTHESIA POSTPROCEDURE EVALUATION
Anesthesia Post Evaluation    Patient: Meron Lamar    Procedure(s) Performed: Procedure(s) (LRB):  XI ROBOTIC REPAIR, HERNIA, PARASTOMAL, REDUCIBLE (N/A)  COLONOSCOPY (N/A)  XI ROBOTIC LYSIS, ADHESIONS (N/A)    Final Anesthesia Type: general      Patient location during evaluation: PACU  Patient participation: Yes- Able to Participate  Level of consciousness: awake and alert and oriented  Post-procedure vital signs: reviewed and stable  Pain management: adequate  Airway patency: patent    PONV status at discharge: No PONV  Anesthetic complications: no      Cardiovascular status: hemodynamically stable  Respiratory status: unassisted  Hydration status: euvolemic  Follow-up not needed.              Vitals Value Taken Time   /65 04/30/25 13:31   Temp 36.3 °C (97.3 °F) 04/30/25 12:53   Pulse 66 04/30/25 13:44   Resp 12 04/30/25 13:44   SpO2 100 % 04/30/25 13:44   Vitals shown include unfiled device data.      No case tracking events are documented in the log.      Pain/Shanti Score: Pain Rating Prior to Med Admin: 0 (4/30/2025  9:21 AM)  Shanti Score: 4 (4/30/2025  1:30 PM)

## 2025-04-30 NOTE — BRIEF OP NOTE
[facility] - Surgery ([facility])  Brief Operative Note    SUMMARY     Surgery Date: 4/30/2025     Surgeons and Role:     * RAHUL Jacobson MD - Primary     * Jordyn Adams MD - Fellow    Assisting Surgeon: None    Pre-op Diagnosis:  Parastomal hernia without obstruction or gangrene [K43.5]  History of rectal cancer [Z85.048]    Post-op Diagnosis:  Post-Op Diagnosis Codes:     * Parastomal hernia without obstruction or gangrene [K43.5]     * History of rectal cancer [Z85.048]    Procedure(s) (LRB):  XI ROBOTIC REPAIR, HERNIA, PARASTOMAL, REDUCIBLE (N/A)  COLONOSCOPY (N/A)  XI ROBOTIC LYSIS, ADHESIONS (N/A)    Anesthesia: General    Implants:  Implant Name Type Inv. Item Serial No.  Lot No. LRB No. Used Action   MESH PATCH AUDIE VENTRALEX 6X8 - [id number]  MESH PATCH AUDIE VENTRALEX 6X8  [employer] [id number] N/A 1 Implanted       Operative Findings: required lysis of adhesions taking omentum from abdominal wall, partially closed fascial defect superior to ostomy, ventralite coated mesh sutured in place (sugarbaker)    Estimated Blood Loss: * No values recorded between 4/30/2025  9:35 AM and 4/30/2025 12:41 PM *    Estimated Blood Loss has not been documented. EBL = 50.         Specimens:   Specimen (24h ago, onward)      None          * No specimens in log *    [id number]

## 2025-04-30 NOTE — NURSING
Patient arrived to room 1006 via bed, family present at bedside, VS taken and documented, admission documentation completed, SCD's applied, instructed on IS use, oriented to room and equipment, allowed time for questions, all questions answered, no further concerns voiced at this time, safety measures in place, call light within reach, instructed to call with any needs, verbalized understanding, continue current plan of care.

## 2025-04-30 NOTE — ANESTHESIA PREPROCEDURE EVALUATION
04/29/2025  Meron Lamar is a 59 y.o., female     Hx of locally advanced rectal ca s/p APR with postoperative complications  Now presenting with recurrent parastomal hernia for repair.  She continues on chemotherapy regimen via R chest Port -- last dose 3 weeks ago    New complaint of bilateral leg swelling and redness.  She is on lasix PO at home. Has not taken in 2 days.   Surgical team deemed findings as lymphedema and OK to proceed    Hx of anesthetics in past without complications  NPO>8 hours  No food or drug allergies  Amenable to proceed with scheduled procedure    Procedure: XI ROBOTIC REPAIR, HERNIA, PARASTOMAL, REDUCIBLE (N/A), COLONOSCOPY (N/A)       Problem List[1]    Past Medical History:   Diagnosis Date    Colon cancer Oc t 2020    rectal     Depression     Diabetes mellitus     pre diabetic    Hypertension     Postoperative hypothyroidism 10/06/2020    Rectal cancer 10/06/2020    Rectal cancer     Renal disorder     kidney stone    Sleep apnea     c pap machine in use    Thyroid disease     Tumor of lung        ECHO: No results found for this or any previous visit.      There is no height or weight on file to calculate BMI.    Tobacco Use: Low Risk  (3/18/2025)    Patient History     Smoking Tobacco Use: Never     Smokeless Tobacco Use: Never     Passive Exposure: Not on file       Social History     Substance and Sexual Activity   Drug Use Never        Alcohol Use: Not At Risk (3/17/2025)    AUDIT-C     Frequency of Alcohol Consumption: Monthly or less     Average Number of Drinks: 1 or 2     Frequency of Binge Drinking: Never       Review of patient's allergies indicates:  No Known Allergies      Airway:  No value filed.         Pre-op Assessment    I have reviewed the Patient Summary Reports.     I have reviewed the Nursing Notes. I have reviewed the NPO Status.   I have reviewed the  Medications.     Review of Systems  Anesthesia Hx:  No problems with previous Anesthesia   History of prior surgery of interest to airway management or planning:          Denies Family Hx of Anesthesia complications.    Denies Personal Hx of Anesthesia complications.                    Hematology/Oncology:       -- Anemia: Anemia of Chronic Kidney Disease    s/p chemotherapy, improved, s/p transfusion and acute on chronic                 Current/Recent Cancer.  --  Cancer in past history:                     Cardiovascular:     Hypertension          PVD hyperlipidemia                               Renal/:  Chronic Renal Disease                Hepatic/GI:         Taking GLP-1 Agonists            Musculoskeletal:  Arthritis          Spine Disorders: cervical Degenerative disease, Disc disease and Chronic Pain           Psych:  Psychiatric History anxiety depression                Physical Exam  General: Cooperative, Well nourished, Alert and Oriented    Airway:  Mallampati: II / I  Mouth Opening: Small, but > 3cm  TM Distance: Normal  Tongue: Normal  Neck ROM: Extension Decreased, Left Lateral Motion Decreased, Right Lateral Motion Decreased, Flexion Decreased    Dental:  Intact, Periodontal disease        Anesthesia Plan  Type of Anesthesia, risks & benefits discussed:    Anesthesia Type: Gen ETT  Intra-op Monitoring Plan: Standard ASA Monitors  Post Op Pain Control Plan: multimodal analgesia and IV/PO Opioids PRN  Induction:  IV  Airway Plan: , Post-Induction  Informed Consent: Informed consent signed with the Patient and all parties understand the risks and agree with anesthesia plan.  All questions answered. Patient consented to blood products? Yes  ASA Score: 3    Ready For Surgery From Anesthesia Perspective.     .           [1]   Patient Active Problem List  Diagnosis    Colon cancer metastasized to liver    Vaginal mass    Type 2 diabetes mellitus without complication, without long-term current use of  insulin    Hypertensive disorder    Postoperative hypothyroidism    Rectovaginal fistula    Attention to colostomy    Rectal cancer    Acute blood loss anemia

## 2025-05-01 VITALS
BODY MASS INDEX: 36.61 KG/M2 | RESPIRATION RATE: 18 BRPM | DIASTOLIC BLOOD PRESSURE: 67 MMHG | HEIGHT: 64 IN | SYSTOLIC BLOOD PRESSURE: 131 MMHG | TEMPERATURE: 98 F | WEIGHT: 214.44 LBS | OXYGEN SATURATION: 95 % | HEART RATE: 69 BPM

## 2025-05-01 PROBLEM — K43.5 PARASTOMAL HERNIA WITHOUT OBSTRUCTION OR GANGRENE: Status: ACTIVE | Noted: 2025-05-01

## 2025-05-01 PROBLEM — Z87.19 S/P HERNIA REPAIR: Status: ACTIVE | Noted: 2025-05-01

## 2025-05-01 PROBLEM — Z98.890 S/P HERNIA REPAIR: Status: ACTIVE | Noted: 2025-05-01

## 2025-05-01 PROBLEM — D64.89 OTHER SPECIFIED ANEMIAS: Status: ACTIVE | Noted: 2025-05-01

## 2025-05-01 LAB
ABSOLUTE EOSINOPHIL (OHS): 0 K/UL
ABSOLUTE MONOCYTE (OHS): 0.69 K/UL (ref 0.3–1)
ABSOLUTE NEUTROPHIL COUNT (OHS): 6.87 K/UL (ref 1.8–7.7)
ANION GAP (OHS): 12 MMOL/L (ref 8–16)
BASOPHILS # BLD AUTO: 0.04 K/UL
BASOPHILS NFR BLD AUTO: 0.5 %
BUN SERPL-MCNC: 15 MG/DL (ref 6–20)
CALCIUM SERPL-MCNC: 8.8 MG/DL (ref 8.7–10.5)
CHLORIDE SERPL-SCNC: 106 MMOL/L (ref 95–110)
CO2 SERPL-SCNC: 23 MMOL/L (ref 23–29)
CREAT SERPL-MCNC: 0.9 MG/DL (ref 0.5–1.4)
ERYTHROCYTE [DISTWIDTH] IN BLOOD BY AUTOMATED COUNT: 21.3 % (ref 11.5–14.5)
GFR SERPLBLD CREATININE-BSD FMLA CKD-EPI: >60 ML/MIN/1.73/M2
GLUCOSE SERPL-MCNC: 112 MG/DL (ref 70–110)
HCT VFR BLD AUTO: 28.2 % (ref 37–48.5)
HGB BLD-MCNC: 7.4 GM/DL (ref 12–16)
IMM GRANULOCYTES # BLD AUTO: 0.02 K/UL (ref 0–0.04)
IMM GRANULOCYTES NFR BLD AUTO: 0.2 % (ref 0–0.5)
LYMPHOCYTES # BLD AUTO: 0.93 K/UL (ref 1–4.8)
MAGNESIUM SERPL-MCNC: 2 MG/DL (ref 1.6–2.6)
MCH RBC QN AUTO: 19.4 PG (ref 27–31)
MCHC RBC AUTO-ENTMCNC: 26.2 G/DL (ref 32–36)
MCV RBC AUTO: 74 FL (ref 82–98)
NUCLEATED RBC (/100WBC) (OHS): 0 /100 WBC
PHOSPHATE SERPL-MCNC: 5.8 MG/DL (ref 2.7–4.5)
PLATELET # BLD AUTO: 162 K/UL (ref 150–450)
PMV BLD AUTO: 9.4 FL (ref 9.2–12.9)
POTASSIUM SERPL-SCNC: 4.5 MMOL/L (ref 3.5–5.1)
RBC # BLD AUTO: 3.81 M/UL (ref 4–5.4)
RELATIVE EOSINOPHIL (OHS): 0 %
RELATIVE LYMPHOCYTE (OHS): 10.9 % (ref 18–48)
RELATIVE MONOCYTE (OHS): 8.1 % (ref 4–15)
RELATIVE NEUTROPHIL (OHS): 80.3 % (ref 38–73)
SODIUM SERPL-SCNC: 141 MMOL/L (ref 136–145)
WBC # BLD AUTO: 8.55 K/UL (ref 3.9–12.7)

## 2025-05-01 PROCEDURE — 25000003 PHARM REV CODE 250: Performed by: STUDENT IN AN ORGANIZED HEALTH CARE EDUCATION/TRAINING PROGRAM

## 2025-05-01 PROCEDURE — 97116 GAIT TRAINING THERAPY: CPT

## 2025-05-01 PROCEDURE — 63600175 PHARM REV CODE 636 W HCPCS: Performed by: STUDENT IN AN ORGANIZED HEALTH CARE EDUCATION/TRAINING PROGRAM

## 2025-05-01 PROCEDURE — 80048 BASIC METABOLIC PNL TOTAL CA: CPT | Performed by: STUDENT IN AN ORGANIZED HEALTH CARE EDUCATION/TRAINING PROGRAM

## 2025-05-01 PROCEDURE — 36415 COLL VENOUS BLD VENIPUNCTURE: CPT | Performed by: STUDENT IN AN ORGANIZED HEALTH CARE EDUCATION/TRAINING PROGRAM

## 2025-05-01 PROCEDURE — 84100 ASSAY OF PHOSPHORUS: CPT | Performed by: STUDENT IN AN ORGANIZED HEALTH CARE EDUCATION/TRAINING PROGRAM

## 2025-05-01 PROCEDURE — 97161 PT EVAL LOW COMPLEX 20 MIN: CPT

## 2025-05-01 PROCEDURE — 83735 ASSAY OF MAGNESIUM: CPT | Performed by: STUDENT IN AN ORGANIZED HEALTH CARE EDUCATION/TRAINING PROGRAM

## 2025-05-01 PROCEDURE — 85025 COMPLETE CBC W/AUTO DIFF WBC: CPT | Performed by: STUDENT IN AN ORGANIZED HEALTH CARE EDUCATION/TRAINING PROGRAM

## 2025-05-01 RX ORDER — ENOXAPARIN SODIUM 100 MG/ML
40 INJECTION SUBCUTANEOUS EVERY 24 HOURS
Status: DISCONTINUED | OUTPATIENT
Start: 2025-05-01 | End: 2025-05-01 | Stop reason: HOSPADM

## 2025-05-01 RX ORDER — ONDANSETRON 4 MG/1
4 TABLET, ORALLY DISINTEGRATING ORAL EVERY 6 HOURS PRN
Qty: 20 TABLET | Refills: 0 | Status: SHIPPED | OUTPATIENT
Start: 2025-05-01

## 2025-05-01 RX ORDER — OXYCODONE HYDROCHLORIDE 5 MG/1
5 TABLET ORAL EVERY 4 HOURS PRN
Qty: 15 TABLET | Refills: 0 | Status: SHIPPED | OUTPATIENT
Start: 2025-05-01 | End: 2025-05-05 | Stop reason: SDUPTHER

## 2025-05-01 RX ADMIN — IBUPROFEN 800 MG: 800 INJECTION INTRAVENOUS at 06:05

## 2025-05-01 RX ADMIN — MUPIROCIN: 20 OINTMENT TOPICAL at 08:05

## 2025-05-01 RX ADMIN — AMLODIPINE BESYLATE 5 MG: 5 TABLET ORAL at 08:05

## 2025-05-01 RX ADMIN — LEVOTHYROXINE SODIUM 50 MCG: 0.05 TABLET ORAL at 05:05

## 2025-05-01 RX ADMIN — ACETAMINOPHEN 1000 MG: 10 INJECTION, SOLUTION INTRAVENOUS at 05:05

## 2025-05-01 RX ADMIN — OXYCODONE HYDROCHLORIDE 10 MG: 10 TABLET ORAL at 05:05

## 2025-05-01 RX ADMIN — OXYCODONE HYDROCHLORIDE 10 MG: 10 TABLET ORAL at 12:05

## 2025-05-01 RX ADMIN — ACYCLOVIR 400 MG: 200 CAPSULE ORAL at 08:05

## 2025-05-01 RX ADMIN — GABAPENTIN 100 MG: 100 CAPSULE ORAL at 08:05

## 2025-05-01 NOTE — PLAN OF CARE
[facility]  Discharge Final Note    Primary Care Provider: Qasim Boston MD  Expected Discharge Date: 5/1/2025    Patient medically ready for discharge to home.     Transportation by family.     Is family/patient aware of discharge: yes     Hospital follow up scheduled: yes       Final Discharge Note (most recent)       Final Note - 05/01/25 1658          Final Note    Assessment Type Final Discharge Note     Anticipated Discharge Disposition Home or Self Care     Hospital Resources/Appts/Education Provided Provided patient/caregiver with written discharge plan information                     Important Message from Medicare         Referral Info (most recent)       Referral Info    No documentation.                 Contact Info       RAHUL Jacobson MD   Specialty: Colon and Rectal Surgery    [address] [id number]   Phone: [phone]       Next Steps: Follow up in 2 week(s)    Yuliana Santiago, CNS   Specialty: Wound Care    [address] [address]   Phone: [phone]       Next Steps: Follow up in 2 week(s)          Future Appointments   Date Time Provider Department Center   5/20/2025  2:20 PM RAHUL Jacobson MD [facility] COLON [facility]   9/17/2025  9:30 AM Alejo Mccullough MD [facility] GYN ON[facility]     Esvin Zhou RN  Case Management  Ext: [phone]  05/01/2025

## 2025-05-01 NOTE — PROGRESS NOTES
[facility]  Colorectal Surgery  Progress Note    Patient Name: Meron Lamar  MRN: [id number]  Admission Date: 4/30/2025  Hospital Length of Stay: 1 days  Attending Physician: RAHUL Jacobson MD    Subjective:     Interval History: NAEO, pt doing well, pain controlled, no n/v, passing flatus via ostomy, corral out this am, needs to ambulate    Post-Op Info:  Procedure(s) (LRB):  XI ROBOTIC REPAIR, HERNIA, PARASTOMAL, REDUCIBLE (N/A)  COLONOSCOPY (N/A)  XI ROBOTIC LYSIS, ADHESIONS (N/A)   1 Day Post-Op      Medications:  Continuous Infusions:  Scheduled Meds:   acetaminophen  1,000 mg Oral Q8H    acyclovir  400 mg Oral BID    amLODIPine  5 mg Oral Daily    enoxparin  40 mg Subcutaneous Q24H (prophylaxis, 1700)    EScitalopram oxalate  10 mg Oral QHS    gabapentin  100 mg Oral BID    ibuprofen  800 mg Oral Q8H    levothyroxine  50 mcg Oral Before breakfast    metoprolol succinate  100 mg Oral Nightly    mupirocin   Nasal BID     PRN Meds:   acetaminophen tablet 1,000 mg    acyclovir capsule 400 mg    amLODIPine tablet 5 mg    enoxaparin injection 40 mg    EScitalopram oxalate tablet 10 mg    gabapentin capsule 100 mg    ibuprofen tablet 800 mg    levothyroxine tablet 50 mcg    metoprolol succinate (TOPROL-XL) 24 hr tablet 100 mg    mupirocin 2 % ointment        Objective:     Vital Signs (Most Recent):  Temp: 97.4 °F (36.3 °C) (05/01/25 0808)  Pulse: 70 (05/01/25 0808)  Resp: 18 (05/01/25 0808)  BP: 123/72 (05/01/25 0808)  SpO2: (!) 93 % (05/01/25 0808) Vital Signs (24h Range):  Temp:  [97.3 °F (36.3 °C)-98.1 °F (36.7 °C)] 97.4 °F (36.3 °C)  Pulse:  [65-87] 70  Resp:  [12-20] 18  SpO2:  [91 %-100 %] 93 %  BP: (104-160)/(51-72) 123/72     Intake/Output - Last 3 Shifts         04/29 0700 04/30 0659 04/30 0700 05/01 0659 05/01 0700 05/02 0659    P.O.  720     I.V. (mL/kg)  0 (0)     IV Piggyback  450     Total Intake(mL/kg)  1170 (12)     Urine (mL/kg/hr)  1125     Total Output  1125     Net  +45                     Gen: WD/WN in NAD  HEENT: MMM, Sclera anicteric   Chest: Normocardic, normotensive, bilateral chest rise  Abd: Soft, aT, nD, stoma pink and productive of flatus, binder in place, incisions c/d/i  Ext: No pitting edema noted       Significant Labs:  BMP (Last 3 Results):   Recent Labs   Lab 05/01/25  0529   *      K 4.5      CO2 23   BUN 15   CREATININE 0.9   CALCIUM 8.8   MG 2.0     CBC (Last 3 Results):   Recent Labs   Lab 04/30/25  1004 05/01/25  0529   WBC 8.73 8.55   RBC 4.36 3.81*   HGB 8.5* 7.4*   HCT 31.6* 28.2*    162   MCV 73* 74*   MCH 19.5* 19.4*   MCHC 26.9* 26.2*         Assessment/Plan:     Active Diagnoses:    Diagnosis Date Noted POA    PRINCIPAL PROBLEM:  Parastomal hernia without obstruction or gangrene [K43.5] 05/01/2025 Yes      Problems Resolved During this Admission:       59F s/p robotic sugarbaker on 4/30, recovering well    -Low res  -Void trial  -OOBAT  -DC this afternoon if tolerates diet     Luis Camejo MD  Colorectal Surgery  [facility]

## 2025-05-01 NOTE — PLAN OF CARE
"[facility] Plan of Care Note    Dx: Parastomal hernia without obstruction or gangrene [K43.5]  History of rectal cancer [Z85.048]  Parastomal hernia [K43.5]    Shift Events: Patient admitted [facility] on AM shift, Pain Management, safety, corral care, ostomy care    Goals of Care: Free from Injury and infection, Safety, Plan of care on-going and progressing     Neuro: AAOx4    Vital Signs: /67 (BP Location: Right arm, Patient Position: Lying)   Pulse 85   Temp 97.5 °F (36.4 °C) (Oral)   Resp 18   Ht 5' 4" (1.626 m)   Wt 97.3 kg (214 lb 7.1 oz)   SpO2 95%   Breastfeeding No   BMI 36.81 kg/m²     Respiratory: AAOx4     Diet: Diet Low Fiber/Residue      Is patient tolerating current diet? yes    GTTS: NS @ 40ml/hr    Urine Output/Bowel Movement:     Intake/Output Summary (Last 24 hours) at 5/1/2025 0624  Last data filed at 5/1/2025 0602  Gross per 24 hour   Intake 1170 ml   Output 1125 ml   Net 45 ml          Drains/Tubes/Tube Feeds (include total output/shift):   Output by Drain (mL) 04/29/25 0701 - 04/29/25 1900 04/29/25 1901 - 04/30/25 0700 04/30/25 0701 - 04/30/25 1900 04/30/25 1901 - 05/01/25 0624   Requested LDAs do not have output data documented.          Lines: 18g R hand, 18g L hand       Accuchecks:n/a    Skin: see LDA flowsheet     Fall Risk Score: 11    Activity level? Standing activities with assist     Any scheduled procedures? N/a    Any safety concerns? Fall precautions     Other:    "

## 2025-05-01 NOTE — CARE UPDATE
Unit DA Care Support Interaction      I have reviewed the chart of Meron Lamar who is hospitalized for Parastomal hernia without obstruction or gangrene. The patient is currently located in the following unit: [facility]             I have seen and examined the patient and provided the following support:     Patient experience rounds - positive experience reported       Sana Boudreaux, MARTÍNEZP-C  Unit Based DA

## 2025-05-01 NOTE — PT/OT/SLP EVAL
Physical Therapy Evaluation    Patient Name:  Meron Lamar   MRN:  [id number]    Recommendations:     Discharge Recommendations: Low Intensity Therapy   Discharge Equipment Recommendations:  (to be determined)   Barriers to discharge: None    Assessment:     Meron Lamar is a 59 y.o. female admitted with a medical diagnosis of Parastomal hernia without obstruction or gangrene.  She presents with the following impairments/functional limitations: impaired endurance, weakness, impaired self care skills, impaired functional mobility, gait instability, impaired balance, decreased coordination, decreased lower extremity function, decreased safety awareness, pain. Pt received up in chair and was agreeable to therapy evaluation this date. Pt endorsing 6/10 abdominal pain following coughing spell right before PT entered room. Pt required SBA for sit to stand with no AD. Pt was able to ambulate ~50' in hallway this date with close SBA and periodic use of handrail on wall but no AD. Pt demonstrated slow gait speed and mild unsteadiness during gait trial. Short step length bilaterally with decreased venice. Some dizziness reported towards end of gait trial. Pt declined to use RW for stability. Upon return to room, pt completed toileting and was returned to chair with all needs met. At this time, pt is functioning below her baseline and would benefit from skilled intervention to address deficits and improve function with a focus on gait stability and normalizing gait pattern and speed.     Rehab Prognosis: Good; patient would benefit from acute skilled PT services to address these deficits and reach maximum level of function.    Recent Surgery: Procedure(s) (LRB):  XI ROBOTIC REPAIR, HERNIA, PARASTOMAL, REDUCIBLE (N/A)  COLONOSCOPY (N/A)  XI ROBOTIC LYSIS, ADHESIONS (N/A) 1 Day Post-Op    Plan:     During this hospitalization, patient to be seen 4 x/week to address the identified rehab impairments via gait training,  therapeutic activities, therapeutic exercises, neuromuscular re-education and progress toward the following goals:    Plan of Care Expires:  05/31/25    Subjective     Chief Complaint: abdominal pain following coughing   Patient/Family Comments/goals: Pt agreeable to therapy session this date.   Pain/Comfort:  Pain Rating 1: 6/10  Location - Orientation 1: generalized  Location 1: abdomen  Pain Addressed 1: Reposition, Distraction    Patients cultural, spiritual, Latter day conflicts given the current situation: no    Living Environment:  Pt lives with  in a [facility] with a threshold entrance. Bathroom set up is a tub/shower. Pt does report that she has 2 flights of stairs to climb at work with no elevator access and bilateral handrails.   Prior to admission, patients level of function was independent. Pt works as an  and drives.  Equipment used at home: [employer] bar.  DME owned (not currently used): none.  Upon discharge, patient will have assistance from .    Objective:     Communicated with nursing prior to session.  Patient found up in chair with  (no active lines)  upon PT entry to room.    General Precautions: Standard, fall  Orthopedic Precautions:N/A   Braces: N/A  Respiratory Status: Room air    Exams:  Cognitive Exam:  Patient is oriented to Person, Place, Time, and Situation  RLE ROM: WNL  RLE Strength: WNL  LLE ROM: WNL  LLE Strength: WNL    Functional Mobility:  Transfers:     Sit to Stand:  stand by assistance with no AD  Toilet Transfer: stand by assistance with  no AD  using  Step Transfer  Gait: ~50' in hallway with close SBA and periodic use of handrail in roegrs. Pt demonstrated slow gait speed and mild unsteadiness during gait trial. Short step length bilaterally with decreased venice. Some dizziness reported towards end of gait trial. Pt declined to use RW for stability.  Balance: fair      AM-PAC 6 CLICK MOBILITY  Total Score:17       Treatment & Education:  Discussed therapy  rationale, POC, and goals. Educated pt on benefits of RW for stability. Discussed walking with nursing later in the day and BLE therex while in the room.    Patient left up in chair with all lines intact and call button in reach.    GOALS:   Multidisciplinary Problems       Physical Therapy Goals          Problem: Physical Therapy    Goal Priority Disciplines Outcome Interventions   Physical Therapy Goal     PT, PT/OT Progressing    Description: Goals to be met by: 5/15/2025     Patient will increase functional independence with mobility by performin. Supine to sit with Set-up [place]  2. Sit to supine with Set-up [place]  3. Rolling to Left and Right with Set-up Assistance.  4. Sit to stand transfer with Supervision  5. Bed to chair transfer with Supervision using No Assistive Device  6. Gait  x 200 feet with Supervision using No Assistive Device.   7. Ascend/descend 2 flights of stairs with bilateral Handrails Supervision using No Assistive Device.   8. Lower extremity exercise program x15 reps per handout, with independence                         DME Justifications:  To be determined    History:     Past Medical History:   Diagnosis Date    Colon cancer Oc t     rectal     Depression     Diabetes mellitus     pre diabetic    Hypertension     Postoperative hypothyroidism 10/06/2020    Rectal cancer 10/06/2020    Rectal cancer     Renal disorder     kidney stone    Sleep apnea     c pap machine in use    Thyroid disease     Tumor of lung        Past Surgical History:   Procedure Laterality Date    ABDOMINOPERINEAL RESECTION OF RECTUM N/A 2021    Procedure: PROCTECTOMY, ABDOMINOPERINEAL;  Surgeon: RAHUL Jacobson MD;  [facility];  Service: Colon and Rectal;  Laterality: N/A;  ERAS high    COLONOSCOPY N/A 2022    Procedure: COLONOSCOPY through stoma;  Surgeon: RAHUL Jaocbson MD;  Location: [facility] (4TH FLR);  Service: Endoscopy;  Laterality: N/A;  fully vacc  to bring card  inst emailed and via portal  clear liquids 4hr prior-AM Prep-LW    COLONOSCOPY N/A 4/30/2025    Procedure: COLONOSCOPY;  Surgeon: RAHUL Jacobson MD;  Location: [facility] OR 2ND FLR;  Service: Colon and Rectal;  Laterality: N/A;    CREATION OF MUSCLE ROTATIONAL FLAP  8/16/2021    Procedure: CREATION, FLAP, MUSCLE ROTATION;  Surgeon: RAHUL Jacobson MD;  Location: [facility] OR 2ND FLR;  Service: Colon and Rectal;;    CREATION OF MUSCLE ROTATIONAL FLAP N/A 8/16/2021    Procedure: CREATION, FLAP, MUSCLE ROTATION;  Surgeon: Nicholas Saez MD;  Location: [facility] OR [facility] FLR;  Service: Plastics;  Laterality: N/A;  Vertical rectus abdominis flap    ESOPHAGOGASTRODUODENOSCOPY      EXAMINATION UNDER ANESTHESIA N/A 10/12/2020    Procedure: Exam under anesthesia, flex sig;  Surgeon: Blake Jacobson MD;  [facility];  Service: Endoscopy;  Laterality: N/A;  Rectal and vaginal areas    FISTULA REPAIR      anal fistula    FLAP PROCEDURE N/A 8/16/2021    Procedure: Fascia flap creation;  Surgeon: Nicholas Saez MD;  [facility];  Service: Plastics;  Laterality: N/A;    FLEXIBLE SIGMOIDOSCOPY  10/12/2020    Procedure: SIGMOIDOSCOPY, FLEXIBLE;  Surgeon: Blake Jacobson MD;  [facility];  Service: Endoscopy;;    FUSION OF CERVICAL SPINE BY POSTERIOR APPROACH      anterior and posterior    RADIOFREQUENCY ABLATION OF LIVER LESION  4/6/2021    Procedure: RADIOFREQUENCY ABLATION, LESION, LIVER Robotic of segment 8 ;  Surgeon: Luciano Castaneda MD;  [facility];  Service: General;;    REVISION OF COLOSTOMY WITH REPAIR OF PARACOLOSTOMY HERNIA N/A 8/16/2021    Procedure: REVISION, COLOSTOMY, WITH PARACOLOSTOMY HERNIA REPAIR;  Surgeon: RAHUL Jacobson MD;  Location: [facility] OR 2ND FLR;  Service: Colon and Rectal;  Laterality: N/A;    ROBOT-ASSISTED LAPAROSCOPIC LYSIS OF ADHESIONS USING DA MARBELLA XI N/A 4/30/2025    Procedure: XI ROBOTIC LYSIS,  ADHESIONS;  Surgeon: RAHUL Jacobson MD;  Location: [facility] OR 2ND FLR;  Service: Colon and Rectal;  Laterality: N/A;    ROBOT-ASSISTED LAPAROSCOPIC PARTIAL SURGICAL REMOVAL OF LIVER USING DA MARBELLA XI N/A 4/6/2021    Procedure: XI ROBOTIC RESECTION, LIVER - RIGHT POSTERIOR - NEED DROP IN BK PROBE;  Surgeon: Luciano Castaneda MD;  Location: [facility] OR 2ND FLR;  Service: General;  Laterality: N/A;    TOTAL ABDOMINAL HYSTERECTOMY W/ BILATERAL SALPINGOOPHORECTOMY N/A 8/16/2021    Procedure: HYSTERECTOMY, TOTAL, ABDOMINAL, WITH BILATERAL SALPINGO-OOPHORECTOMY;  Surgeon: Alejo Mccullough MD;  Location: [facility] OR 2ND FLR;  Service: OB/GYN;  Laterality: N/A;    VAGINA RECONSTRUCTION SURGERY N/A 8/16/2021    Procedure: RECONSTRUCTION, VAGINA;  Surgeon: Nicholas Saez MD;  Location: [facility] OR 2ND FLR;  Service: Plastics;  Laterality: N/A;    VAGINECTOMY N/A 8/16/2021    Procedure: VAGINECTOMY;  Surgeon: RAHUL Jacobson MD;  [facility];  Service: Colon and Rectal;  Laterality: N/A;  Posterior    XI ROBOTIC REPAIR, HERNIA, PARASTOMAL N/A 4/30/2025    Procedure: XI ROBOTIC REPAIR, HERNIA, PARASTOMAL, REDUCIBLE;  Surgeon: RAHUL Jacobson MD;  Location: [facility] OR 2ND FLR;  Service: Colon and Rectal;  Laterality: N/A;       Time Tracking:     PT Received On: 05/01/25  PT Start Time: 0902     PT Stop Time: 0921  PT Total Time (min): 19 min     Billable Minutes: Evaluation 8 and Gait Training 11      05/01/2025

## 2025-05-01 NOTE — CARE UPDATE
I have reviewed the chart of Meron Lamar who is hospitalized for the following:    Active Hospital Problems    Diagnosis    *Parastomal hernia without obstruction or gangrene    BMI 36.0-36.9,adult    S/P hernia repair    Other specified anemias    Type 2 diabetes mellitus without complication, without long-term current use of insulin    Hypertensive disorder        SHANIKA Del Rosario-AZALIA  [facility] Based [facility]

## 2025-05-01 NOTE — PLAN OF CARE
Problem: Physical Therapy  Goal: Physical Therapy Goal  Description: Goals to be met by: 5/15/2025     Patient will increase functional independence with mobility by performin. Supine to sit with Set-up [place]  2. Sit to supine with Set-up [place]  3. Rolling to Left and Right with Set-up Assistance.  4. Sit to stand transfer with Supervision  5. Bed to chair transfer with Supervision using No Assistive Device  6. Gait  x 200 feet with Supervision using No Assistive Device.   7. Ascend/descend 2 flights of stairs with bilateral Handrails Supervision using No Assistive Device.   8. Lower extremity exercise program x15 reps per handout, with independence    Outcome: Progressing

## 2025-05-02 NOTE — DISCHARGE SUMMARY
[facility]  Colorectal Surgery  Discharge Summary      Patient Name: Meron Lamar  MRN: [id number]  Admission Date: 4/30/2025  Hospital Length of Stay: 1 days  Discharge Date and Time: 5/1/2025  2:01 PM  Attending Physician: No att. providers found   Discharging Provider: Luis Camejo MD  Primary Care Provider: Qasim Boston MD     HPI: 59F with a parastomal hernia who presented for elective robotic repair    Procedure(s) (LRB):  XI ROBOTIC REPAIR, HERNIA, PARASTOMAL, REDUCIBLE (N/A)  COLONOSCOPY (N/A)  XI ROBOTIC LYSIS, ADHESIONS (N/A)     Hospital Course: Patient tolerated her procedure well. Her corral was removed, diet advanced, and pain was controlled. She was discharged home on POD1 tolerating a diet, ambulating, voiding, having flatus via stoma, with an outpatient followup plan in place.         Significant Diagnostic Studies: N/A    Pending Diagnostic Studies:       None          Final Active Diagnoses:    Diagnosis Date Noted POA    PRINCIPAL PROBLEM:  Parastomal hernia without obstruction or gangrene [K43.5] 05/01/2025 Yes    BMI 36.0-36.9,adult [Z68.36] 05/01/2025 Not Applicable    S/P hernia repair [Z98.890, Z87.19] 05/01/2025 Not Applicable    Other specified anemias [D64.89] 05/01/2025 Yes    Type 2 diabetes mellitus without complication, without long-term current use of insulin [E11.9] 10/06/2020 Yes    Hypertensive disorder [I10] 02/23/2018 Yes      Problems Resolved During this Admission:      Discharged Condition: good    Disposition: Home or Self Care    Follow Up:   Follow-up Information       RAHUL Jacobson MD Follow up in 2 week(s).    Specialty: Colon and Rectal Surgery  Contact information:  [address] [facility] [id number] [phone]               Yuliana Santiago CNS Follow up in 2 week(s).    Specialty: Wound Care  Contact information:  [address] [facility] [id number] [phone]                           Patient Instructions:      Notify  your health care provider if you experience any of the following:  temperature >100.4     Notify your health care provider if you experience any of the following:  persistent nausea and vomiting or diarrhea     Notify your health care provider if you experience any of the following:  severe uncontrolled pain     Notify your health care provider if you experience any of the following:  redness, tenderness, or signs of infection (pain, swelling, redness, odor or green/yellow discharge around incision site)     Notify your health care provider if you experience any of the following:  difficulty breathing or increased cough     No dressing needed     Weight bearing restrictions (specify):   Order Comments: No lifting over 10 lb for 6 weeks     Medications:  Reconciled Home Medications:      Medication List        START taking these medications      oxyCODONE 5 MG immediate release tablet  Commonly known as: ROXICODONE  Take 1 tablet (5 mg total) by mouth every 4 (four) hours as needed for Pain.            CHANGE how you take these medications      * ondansetron 4 MG Tbdl  Commonly known as: ZOFRAN-ODT  Take 4 mg by mouth.  What changed: Another medication with the same name was added. Make sure you understand how and when to take each.     * ondansetron 4 MG Tbdl  Commonly known as: ZOFRAN-ODT  Dissolve 1 tablet (4 mg total) by mouth every 6 (six) hours as needed (nausea).  What changed: You were already taking a medication with the same name, and this prescription was added. Make sure you understand how and when to take each.           * This list has 2 medication(s) that are the same as other medications prescribed for you. Read the directions carefully, and ask your doctor or other care provider to review them with you.                CONTINUE taking these medications      acyclovir 400 MG tablet  Commonly known as: ZOVIRAX  Take 400 mg by mouth 2 (two) times daily.     amLODIPine 5 MG tablet  Commonly known as:  NORVASC  Take 5 mg by mouth once daily.     EScitalopram oxalate 10 MG tablet  Commonly known as: LEXAPRO  Take 10 mg by mouth every evening.     gabapentin 100 MG capsule  Commonly known as: NEURONTIN  Take 100 mg by mouth 2 (two) times daily.     levothyroxine 25 MCG tablet  Commonly known as: SYNTHROID  Take 25 mcg by mouth before breakfast.     metoprolol succinate 100 MG 24 hr tablet  Commonly known as: TOPROL-XL  Take 100 mg by mouth nightly.     promethazine 12.5 MG Tab  Commonly known as: PHENERGAN  Take 12.5 mg by mouth every 6 (six) hours as needed.     telmisartan 80 MG Tab  Commonly known as: MICARDIS  Take 80 mg by mouth once daily.     VITAMIN D2 1,250 mcg (50,000 unit) capsule  Generic drug: ergocalciferol              Luis Camejo MD  Colorectal Surgery  [facility]

## 2025-05-05 DIAGNOSIS — K43.5 PARASTOMAL HERNIA WITHOUT OBSTRUCTION OR GANGRENE: ICD-10-CM

## 2025-05-05 RX ORDER — OXYCODONE HYDROCHLORIDE 5 MG/1
5 TABLET ORAL EVERY 4 HOURS PRN
Qty: 30 TABLET | Refills: 0 | Status: SHIPPED | OUTPATIENT
Start: 2025-05-05

## 2025-05-09 ENCOUNTER — PATIENT MESSAGE (OUTPATIENT)
Dept: SURGERY | Facility: CLINIC | Age: 60
End: 2025-05-09
Payer: COMMERCIAL

## 2025-05-09 ENCOUNTER — TELEPHONE (OUTPATIENT)
Dept: SURGERY | Facility: CLINIC | Age: 60
End: 2025-05-09
Payer: COMMERCIAL

## 2025-05-09 NOTE — TELEPHONE ENCOUNTER
----- Message from Ramón sent at 5/9/2025  2:04 PM CDT -----  Regarding: Disability  Contact: [phone]  Hi,Who called: The pt Reason:Would like a call  from the nurse to discuss her short term disability. Pls call her at  [phone]'s name:Dr. Jacobson Additional Information: Thank you.

## 2025-05-09 NOTE — TELEPHONE ENCOUNTER
Spoke with patient regarding scheduling appointment on a day with ostomy nurse present. Appointment rescheduled and details confirmed verbally over phone. Message sent to Dr. Jacobson regarding Short term disability.

## 2025-05-13 ENCOUNTER — OFFICE VISIT (OUTPATIENT)
Dept: SURGERY | Facility: CLINIC | Age: 60
End: 2025-05-13
Payer: COMMERCIAL

## 2025-05-13 VITALS
DIASTOLIC BLOOD PRESSURE: 69 MMHG | HEART RATE: 76 BPM | WEIGHT: 208.31 LBS | SYSTOLIC BLOOD PRESSURE: 138 MMHG | HEIGHT: 64 IN | HEART RATE: 76 BPM | DIASTOLIC BLOOD PRESSURE: 69 MMHG | RESPIRATION RATE: 18 BRPM | BODY MASS INDEX: 35.76 KG/M2 | WEIGHT: 208.31 LBS | SYSTOLIC BLOOD PRESSURE: 138 MMHG | BODY MASS INDEX: 35.56 KG/M2

## 2025-05-13 DIAGNOSIS — Z93.3 COLOSTOMY IN PLACE: Primary | ICD-10-CM

## 2025-05-13 DIAGNOSIS — K43.5 PARASTOMAL HERNIA WITHOUT OBSTRUCTION OR GANGRENE: Primary | ICD-10-CM

## 2025-05-13 PROCEDURE — 4010F ACE/ARB THERAPY RXD/TAKEN: CPT | Mod: CPTII,S$GLB,, | Performed by: NURSE PRACTITIONER

## 2025-05-13 PROCEDURE — 3078F DIAST BP <80 MM HG: CPT | Mod: CPTII,S$GLB,, | Performed by: NURSE PRACTITIONER

## 2025-05-13 PROCEDURE — 3008F BODY MASS INDEX DOCD: CPT | Mod: CPTII,S$GLB,, | Performed by: NURSE PRACTITIONER

## 2025-05-13 PROCEDURE — 1159F MED LIST DOCD IN RCRD: CPT | Mod: CPTII,S$GLB,, | Performed by: NURSE PRACTITIONER

## 2025-05-13 PROCEDURE — 3075F SYST BP GE 130 - 139MM HG: CPT | Mod: CPTII,S$GLB,, | Performed by: NURSE PRACTITIONER

## 2025-05-13 PROCEDURE — 99211 OFF/OP EST MAY X REQ PHY/QHP: CPT | Mod: S$GLB,,, | Performed by: NURSE PRACTITIONER

## 2025-05-13 PROCEDURE — 99999 PR PBB SHADOW E&M-EST. PATIENT-LVL III: CPT | Mod: PBBFAC,,, | Performed by: COLON & RECTAL SURGERY

## 2025-05-13 PROCEDURE — 1160F RVW MEDS BY RX/DR IN RCRD: CPT | Mod: CPTII,S$GLB,, | Performed by: NURSE PRACTITIONER

## 2025-05-13 PROCEDURE — 99999 PR PBB SHADOW E&M-EST. PATIENT-LVL III: CPT | Mod: PBBFAC,,, | Performed by: NURSE PRACTITIONER

## 2025-05-13 RX ORDER — HYDROCODONE BITARTRATE AND ACETAMINOPHEN 5; 325 MG/1; MG/1
1 TABLET ORAL EVERY 6 HOURS PRN
Qty: 28 TABLET | Refills: 0 | Status: SHIPPED | OUTPATIENT
Start: 2025-05-13

## 2025-05-13 NOTE — PROGRESS NOTES
"[facility] Office Visit Followup    Referring Md:   No referring provider defined for this encounter.    SUBJECTIVE:     Chief Complaint: rectal cancer    History of Present Illness:    Course is as follows:  Patient is a 59 y.o. female presents with locally advanced rectal cancer.     11/27/19:   8/28/20:  Colonoscopy revealed a single sessile 1.4 cm nonbleeding polyp in the mid rectum at 8 cm from the anus. There was an infiltrative, ulcerated and fungating mass at the distal rectum, measuring 3 cm, causing partial obstruction. This was her first colonoscopy  Pathology: Polyp showed tubular adenoma with focal HG dysplasia. Distal rectal mass biopsy c/w adenocarcinoma with ulceration"   - MMR normal/proficient.     Staging:  - CEA is 31.3  - 9/15/2020 CT C/A/P  4mm R lung nodule and additional region of somewhat nodular atelectasis in the LLL up to 5mm.  2 cm ill-defined region in posterior R lobe of liver present on prior CT imaging 2017. 3.5 cm regional fatty sparing in posteroinferior most aspect of the R lobe of liver. No significant lymph node enlargement in upper abdomen. Few stable lymph nodes in central mesentery unchanged from 2017.  Impression, no CT evidence for metastatic disease.   - 9/29/2020 MRI pelvis  Low stenotic, partially circumferential rectal carcinoma measuring 3.6 cm in length with max thickness 1.7 cm. Distal portion of the tumor from the anal verge measured approx 3.2 cm with partial involvement of the muscularis propria. No evidence of fat plane separation within the posterior wall of the vagina. Largest R presacral node measuring approx 1 x 0.7 cm, another 7 x 5 mm, another 8 x 5 mm.  Uterus 6 x 2.9 x 5.6 cm with 2.1 cm fibroid.  In the posterior wall of the vagina is a mass measuring approx 4.8 x 2.2 x 3.2 cm without connection with the rectal mass. Differential includes vaginal leiomyoma but cannot rule out vaginal carcinoma or LMS.    10/12/20:  EUA with biopsies --> Locally invasive rectal " cancer.  Digital exam demonstrated tumor in the posterior vaginal wall.  Anteriorly was clear.  Lateral sidewalls felt clear on the anterior aspect of the lateral sidewall.  Digital exam of the anus demonstrated large near circumferential rectal mass mostly located anteriorly and extending onto the patient's left side.  Felt tethered to the left levators.  Mass was continuous with the posterior vaginal mass.  Mass was ulcerated in the central aspect anteriorly.  Pat on 10/14/2020 demonstrated hypermetabolic mass in the right lobe of the liver not well visualized on CT scan but appears to measure 3.5 cm.  Additional more inferior mass in right side of the liver also consistent with metastatic disease.  11/06/2020:  Liver biopsy consistent with metastatic colorectal adenocarcinoma.    Plan was for PATRICK.  She completed short course radiotherapy from 10/26/2020 to 10/30/2020.  FOLFOX was started on 11/17/2020.      Medical comorbidities include DM, HTN, hyperthyroidism and sleep apnea (CPAP). BMI 34     No prior abdominal surgeries.  She has had a prior anal fistula versus fissure surgery 20+ years ago.  At that time, she had a flexible sigmoidoscopy that was normal.    Functionally, she is active and performs all of her activities of daily living.  Nonsmoker.  No significant weight loss.  Historically, she had 3 bowel movements per day.  No fecal incontinence.     Family history for father - prostate cancer (dec). Mother with breast cancer (44, dec). MGM and PGM also with unknown cancer.    Tolerated short course XRT from 10/26/20 to 10/20/20.  Neoadjuvant FOLFOX started 11/17/2020. She got 3 cycles and stopped after 12/15/21.  Stopped for vaginal drainage with RV fistula.     01/13/2021:  Underwent laparoscopic loop sigmoid colostomy secondary to rectovaginal fistula    Transitioned for FOLFOXIRI on 2/3/21. She got 3 cycles.      4/6/2021: (Luciano Castaneda)  Robotic assisted segment 6/7 liver resection (right posterior  hepatectomy)  Robotic assisted microwave ablation segment 8 lesion   Pathology:   Multifocal (2 foci) metastatic adenocarcinoma consistent with colonic origin,   2.6 cm and 1.3cm   Resection margin is POSITIVE for one of the lesion     8/16/2021:   1.  Abdominoperineal resection     2.  Posterior vaginectomy  3.  Parastomal hernia repair with revision of colostomy  4.  Unilateral external oblique release with myofascial advancement to allow for abdominal closure  5.  Total abdominal hysterectomy and bilateral salpingo oophorectomy performed by gyn Oncology  6.  Pedicled lap reconstruction for the pelvis performed Plastic surgery  Pathology:  1. Uterus and cervix, bilateral fallopian tubes and bilateral ovaries; total   hysterectomy , bilateral salpingectomies and bilateral oophorectomies   (54.5g):   Cervix:            Serosa:   Positive  for colorectal adenocarcinoma with signet ring cell features   Colorectal adenocarcinoma (6.0 cm):    Macroscopic evaluation of mesorectum:   Complete    Tumor site:  Rectum:   Entirely below anterior peritoneal reflection    Histologic type:   Adenocarcinoma with mucinous (10%) and signet ring cell (5%) features    Histologic type:  Moderately to poorly differentiated (G2-G3)    Tumor size:   6 cm    Tumor extent:   Directly invades or adherence to adjacent structures, vaginal wall and uterine serosa (T4b)   Macroscopic tumor perforation:   Present (rectovaginal fistula)    Lymphovascular invasion:  Present:   Small vessel    Perineural invasion:   Not identified    Treatment effect:   Present, with residual cancer showing evident tumor  regression, but more than single cells or rare small groups (partial  response, score 2)    Margins:     Left vaginal wall  margin-positive  for carcinoma     - Additional Left vaginal wall margin sent.  Tumor in part of the margin but not the entire margin.  Specimen with orientation.  Confirmed with pathologist that tumor only on 1 edge.   Therefore, final margins negative.    Regional lymph node status:   All examined lymph nodes are  negative  for metastatic carcinoma (0/12)    Current status:  9/14/21:  Doing very well.  Intermittent drainage from her perineum.  Currently wearing depends changing than 2-3 times per day.  No fevers or chills.  Pain well controlled.  Ostomy functioning well.  Eating.  Ambulating without assistance.  One drain remains.  Low output.  10/12/21:  Continues to do well.  Active.  No issues with her ostomy.  Decreased vaginal drainage.  Activity is improving.  10/10/23:   Presents for evaluation for parastomal hernia.  She was recently diagnosed with a pulmonary nodule consistent with metastatic disease.  She underwent SBRT.  She remains on adjuvant chemotherapy and is followed by Dr. Cayetano Stearns.   She presents for intermittent pain and swelling around her ostomy as well as increasing difficulty with pouching.  She has gained 20 lb over the past several months.  Regarding the ostomy, she has increased leaks as well as intermittent pain with bending over or movement.  10/22/24: she was recently admitted x 2 for stoma bleeding requiring transfusion.  No bleeding since she was last discharged.  She has a new ostomy appliance in place which is working well.  Regarding the hernia, she has pain with standing from a sitting position.  Otherwise, she is doing well.  She has been off chemotherapy for the past 6 weeks secondary to an upper respiratory tract infection but resumes FOLFIRI with Avastin later this week.  Repeat CT scan plan for this upcoming week.  Last PET scan from 2 months ago with ongoing retroperitoneal adenopathy consistent with known metastatic disease that is stable from prior exam.  3/18/2025:  She presents for evaluation for increasing parastomal hernia causing tearing of the skin around the ostomy.  She had intermittently passes clot from the bleeding around the ostomy.  Denies any blood from the ostomy  itself.  Hernia impacts her quality of life and prevents her activities.  She continues on chemotherapy.    4/30/2025:  Colonoscopy with robotic parastomal hernia repair with sugar Baker mesh underlay  Findings:  1. Multiple adhesions up to the anterior abdominal wall.  These were carefully taken down.  All adhesiolysis was performed sharply.  Once all of the omentum and small bowel were freed off the anterior abdominal wall, the descending colon could be well visualized going into a large paracolostomy hernia.  The fascial defect was partially closed down with a #1 V lock suture that was absorbable.  A 15 x 20 cm piece of Ventralex mesh was placed as an underlay in sugar Moon technique to lateralize the colostomy.  2. Colonoscopy was performed.  Adequate bowel prep.  No lesions seen throughout the remainder of the colon.    05/13/2025:  Presents for follow-up.  Overall doing well.  Pain improving.  Ostomy sized decreasing.  No issues with pouching.    Surveillance:   9/9/2022:  Surveillance colonoscopy performed.  - Impression:            - Widely patent end colostomy with healthy                          appearing mucosa in the descending colon.                          - The examined portion of the ileum was normal.                          - The descending colon, transverse colon and                          ascending colon are normal.                          - No specimens collected.   04/30/2025: No lesions seen.    Review of Systems:  Review of Systems   Constitutional:  Negative for chills, diaphoresis, fever, malaise/fatigue and weight loss.   HENT:  Negative for congestion.    Respiratory:  Negative for shortness of breath.    Cardiovascular:  Negative for chest pain and leg swelling.   Gastrointestinal:  Negative for abdominal pain, blood in stool, constipation, diarrhea, nausea and vomiting.   Genitourinary:  Negative for dysuria.   Musculoskeletal:  Negative for back pain and myalgias.   Skin:   "Negative for rash.   Neurological:  Negative for dizziness and weakness.   Endo/Heme/Allergies:  Does not bruise/bleed easily.   Psychiatric/Behavioral:  Negative for depression.        OBJECTIVE:     Vital Signs (Most Recent)  /69 (BP Location: Left arm, Patient Position: Sitting)   Pulse 76   Resp 18   Ht 5' 4" (1.626 m)   Wt 94.5 kg (208 lb 5.4 oz)   BMI 35.76 kg/m²     Physical Exam:  General: White female in no distress   Neuro: alert and oriented x 4.  Moves all extremities.     HEENT: no icterus.  Trachea midline  Respiratory: respirations are even and unlabored  Cardiac: tachycardic  Abdomen:  Midline incision well healed.  Ostomy in the left mid abdomen is healthy and pink and protrudes above the level of the skin.  Surrounding skin is flat.  No hernia on abdominal straining  Extremities: Warm dry and intact  Skin: no rashes  Anorectal:  Deferred today.  From prior exam: Perineal flap incision is healing very well.  Small amount of granulation tissue treated with silver nitrate.  Posterior vaginal wall intact and healing well.    Labs:   H&H 8 and 28.  Alb 4.1.  Normal renal function  CEA down to 4.2 from 30+    Imaging:   MRI 1/8/2021: Locally invasive low rectal carcinoma with anal sphincter and vaginal involvement with moderate response to treatment.  There is similar tumor extent to the 09/29/2020 exam, but significantly decreased in size and improved signal characteristics, as above.  Development of colovaginal fistula noted.  Two suspicious perirectal lymph nodes, significantly improved.  CT C/A/P 01/08/2021:: multiple hypodense lesions in the right hepatic lobe, some of which appear new since the prior outside CT from 09/15/2020 and are potentially concerning for intrahepatic metastasis     CT abd pelvis 10/23/24 reviewed with 5 5 cm x 6.5 cm parastomal hernia.     ASSESSMENT/PLAN:     Diagnoses and all orders for this visit:    Parastomal hernia without obstruction or gangrene  -     " HYDROcodone-acetaminophen (NORCO) 5-325 mg per tablet; Take 1 tablet by mouth every 6 (six) hours as needed for Pain.              59 y.o. woman with locally advanced rectal cancer with multiple hepatic metastases.  She has received short course radiotherapy followed by FOLFOX chemotherapy.  As response to her treatment, her tumor has decreased in size and she has now developed a rectovaginal fistula.    With the RV fistula, she has stopped eating and is unable to tolerate chemotherapy.   Therefore, urgent fecal diversion was offered with a laparoscopic diverting loop sigmoid colostomy.  This was performed on 01/13/2021.  She then had 3 additional cycles of chemotherapy.    Liver resection was done on 4/6/21.  Positive margins.     Abdominal perineal resection with EN bloc posterior vaginectomy and pedicle flap reconstruction performed on 08/16/2021 for yp T4b N0 M1c rectal cancer.     She had pulmonary metastatic disease treated by SBRT.  She now has retroperitoneal lymphadenopathy be controlled with FOLFIRI and Avastin.    She presents for evaluation for parastomal hernia causing tearing of the parastomal skin resulting in ongoing bleeding as well as pain and impacting her quality of life.  She underwent colonoscopy with robotic assisted parastomal hernia repair with mesh underlay in sugar Moon fashion on 04/30/2025.    She overall is doing very well.  From my standpoint to resume chemotherapy.  Recommend repeat colonoscopy in 3 years.  We will see her back for a virtual visit in 5-6 weeks.      RAHUL Jacobson MD, FACS, FASCRS  Staff Surgeon  Colon & Rectal Surgery      CC: Cayetano Stearns MD

## 2025-05-13 NOTE — PROGRESS NOTES
Mrs. Chance is known to me and comes today after surgery on 4/30/25 with Dr. Jacobson.   Pt is seeing me for post op evaluation of colostomy . The patient reports some problems with  new ostomy.   Pain level today is 4.    ASSESSMENT:  The colostomy is 41 mm flat, pink moist, functioning well.    The pt wearing a 1 piece pouching system by [employer]  Average wear time is 4 days.   Peristomal skin is clean and dry No rashes, no ulcers, no induration    There is no  mucocutaneous separation.  Pt is coping well with new ostomy.  Support being  provided by Family member(s)    Pouching concerns include:    5/13/25    PLAN:  Patient instructed to do the following routine for pouching:  Cleanse skin with water, dry well.  Apply skin barrier film. Allow to dry  Apply thin ring  Apply pouch sized appropriately for stoma.  Wanted to try convex flip today.     Pt counseled on DME suppliers for  ostomy pouches. Pt orders from [place]  Pt also counseled on skin care, nutrition, hydration and ADL.  30 minute visit in face to face counseling.  Return clinic visit recommended prn

## 2025-06-24 ENCOUNTER — OFFICE VISIT (OUTPATIENT)
Dept: SURGERY | Facility: CLINIC | Age: 60
End: 2025-06-24
Payer: COMMERCIAL

## 2025-06-24 DIAGNOSIS — Z85.048 HISTORY OF RECTAL CANCER: ICD-10-CM

## 2025-06-24 DIAGNOSIS — K43.5 PARASTOMAL HERNIA WITHOUT OBSTRUCTION OR GANGRENE: Primary | ICD-10-CM

## 2025-06-24 NOTE — PROGRESS NOTES
"The patient location is: [place]  The chief complaint leading to consultation is: history of rectal cancer    Visit type: audiovisual    Face to Face time with patient: 10 min  15 minutes of total time spent on the encounter, which includes face to face time and non-face to face time preparing to see the patient (eg, review of tests), Obtaining and/or reviewing separately obtained history, Documenting clinical information in the electronic or other health record, Independently interpreting results (not separately reported) and communicating results to the patient/family/caregiver, or Care coordination (not separately reported).         Each patient to whom he or she provides medical services by telemedicine is:  (1) informed of the relationship between the physician and patient and the respective role of any other health care provider with respect to management of the patient; and (2) notified that he or she may decline to receive medical services by telemedicine and may withdraw from such care at any time.    Notes:     CRS Office Visit Followup    Referring Md:   No referring provider defined for this encounter.    SUBJECTIVE:     Chief Complaint: rectal cancer    History of Present Illness:    Course is as follows:  Patient is a 59 y.o. female presents with locally advanced rectal cancer.     11/27/19:   8/28/20:  Colonoscopy revealed a single sessile 1.4 cm nonbleeding polyp in the mid rectum at 8 cm from the anus. There was an infiltrative, ulcerated and fungating mass at the distal rectum, measuring 3 cm, causing partial obstruction. This was her first colonoscopy  Pathology: Polyp showed tubular adenoma with focal HG dysplasia. Distal rectal mass biopsy c/w adenocarcinoma with ulceration"   - MMR normal/proficient.     Staging:  - CEA is 31.3  - 9/15/2020 CT C/A/P  4mm R lung nodule and additional region of somewhat nodular atelectasis in the LLL up to 5mm.  2 cm ill-defined region in posterior R lobe of " liver present on prior CT imaging 2017. 3.5 cm regional fatty sparing in posteroinferior most aspect of the R lobe of liver. No significant lymph node enlargement in upper abdomen. Few stable lymph nodes in central mesentery unchanged from 2017.  Impression, no CT evidence for metastatic disease.   - 9/29/2020 MRI pelvis  Low stenotic, partially circumferential rectal carcinoma measuring 3.6 cm in length with max thickness 1.7 cm. Distal portion of the tumor from the anal verge measured approx 3.2 cm with partial involvement of the muscularis propria. No evidence of fat plane separation within the posterior wall of the vagina. Largest R presacral node measuring approx 1 x 0.7 cm, another 7 x 5 mm, another 8 x 5 mm.  Uterus 6 x 2.9 x 5.6 cm with 2.1 cm fibroid.  In the posterior wall of the vagina is a mass measuring approx 4.8 x 2.2 x 3.2 cm without connection with the rectal mass. Differential includes vaginal leiomyoma but cannot rule out vaginal carcinoma or LMS.    10/12/20:  EUA with biopsies --> Locally invasive rectal cancer.  Digital exam demonstrated tumor in the posterior vaginal wall.  Anteriorly was clear.  Lateral sidewalls felt clear on the anterior aspect of the lateral sidewall.  Digital exam of the anus demonstrated large near circumferential rectal mass mostly located anteriorly and extending onto the patient's left side.  Felt tethered to the left levators.  Mass was continuous with the posterior vaginal mass.  Mass was ulcerated in the central aspect anteriorly.  Pat on 10/14/2020 demonstrated hypermetabolic mass in the right lobe of the liver not well visualized on CT scan but appears to measure 3.5 cm.  Additional more inferior mass in right side of the liver also consistent with metastatic disease.  11/06/2020:  Liver biopsy consistent with metastatic colorectal adenocarcinoma.    Plan was for PATRICK.  She completed short course radiotherapy from 10/26/2020 to 10/30/2020.  FOLFOX was started on  11/17/2020.      Medical comorbidities include DM, HTN, hyperthyroidism and sleep apnea (CPAP). BMI 34     No prior abdominal surgeries.  She has had a prior anal fistula versus fissure surgery 20+ years ago.  At that time, she had a flexible sigmoidoscopy that was normal.    Functionally, she is active and performs all of her activities of daily living.  Nonsmoker.  No significant weight loss.  Historically, she had 3 bowel movements per day.  No fecal incontinence.     Family history for father - prostate cancer (dec). Mother with breast cancer (44, dec). MGM and PGM also with unknown cancer.    Tolerated short course XRT from 10/26/20 to 10/20/20.  Neoadjuvant FOLFOX started 11/17/2020. She got 3 cycles and stopped after 12/15/21.  Stopped for vaginal drainage with RV fistula.     01/13/2021:  Underwent laparoscopic loop sigmoid colostomy secondary to rectovaginal fistula    Transitioned for FOLFOXIRI on 2/3/21. She got 3 cycles.      4/6/2021: (Luciano Castaneda)  Robotic assisted segment 6/7 liver resection (right posterior hepatectomy)  Robotic assisted microwave ablation segment 8 lesion   Pathology:   Multifocal (2 foci) metastatic adenocarcinoma consistent with colonic origin,   2.6 cm and 1.3cm   Resection margin is POSITIVE for one of the lesion     8/16/2021:   1.  Abdominoperineal resection     2.  Posterior vaginectomy  3.  Parastomal hernia repair with revision of colostomy  4.  Unilateral external oblique release with myofascial advancement to allow for abdominal closure  5.  Total abdominal hysterectomy and bilateral salpingo oophorectomy performed by gyn Oncology  6.  Pedicled lap reconstruction for the pelvis performed Plastic surgery  Pathology:  1. Uterus and cervix, bilateral fallopian tubes and bilateral ovaries; total   hysterectomy , bilateral salpingectomies and bilateral oophorectomies   (54.5g):   Cervix:            Serosa:   Positive  for colorectal adenocarcinoma with signet ring cell  features   Colorectal adenocarcinoma (6.0 cm):    Macroscopic evaluation of mesorectum:   Complete    Tumor site:  Rectum:   Entirely below anterior peritoneal reflection    Histologic type:   Adenocarcinoma with mucinous (10%) and signet ring cell (5%) features    Histologic type:  Moderately to poorly differentiated (G2-G3)    Tumor size:   6 cm    Tumor extent:   Directly invades or adherence to adjacent structures, vaginal wall and uterine serosa (T4b)   Macroscopic tumor perforation:   Present (rectovaginal fistula)    Lymphovascular invasion:  Present:   Small vessel    Perineural invasion:   Not identified    Treatment effect:   Present, with residual cancer showing evident tumor  regression, but more than single cells or rare small groups (partial  response, score 2)    Margins:     Left vaginal wall  margin-positive  for carcinoma     - Additional Left vaginal wall margin sent.  Tumor in part of the margin but not the entire margin.  Specimen with orientation.  Confirmed with pathologist that tumor only on 1 edge.  Therefore, final margins negative.    Regional lymph node status:   All examined lymph nodes are  negative  for metastatic carcinoma (0/12)    Current status:  9/14/21:  Doing very well.  Intermittent drainage from her perineum.  Currently wearing depends changing than 2-3 times per day.  No fevers or chills.  Pain well controlled.  Ostomy functioning well.  Eating.  Ambulating without assistance.  One drain remains.  Low output.  10/12/21:  Continues to do well.  Active.  No issues with her ostomy.  Decreased vaginal drainage.  Activity is improving.  10/10/23:   Presents for evaluation for parastomal hernia.  She was recently diagnosed with a pulmonary nodule consistent with metastatic disease.  She underwent SBRT.  She remains on adjuvant chemotherapy and is followed by Dr. Cayetano Stearns.   She presents for intermittent pain and swelling around her ostomy as well as increasing difficulty with  pouching.  She has gained 20 lb over the past several months.  Regarding the ostomy, she has increased leaks as well as intermittent pain with bending over or movement.  10/22/24: she was recently admitted x 2 for stoma bleeding requiring transfusion.  No bleeding since she was last discharged.  She has a new ostomy appliance in place which is working well.  Regarding the hernia, she has pain with standing from a sitting position.  Otherwise, she is doing well.  She has been off chemotherapy for the past 6 weeks secondary to an upper respiratory tract infection but resumes FOLFIRI with Avastin later this week.  Repeat CT scan plan for this upcoming week.  Last PET scan from 2 months ago with ongoing retroperitoneal adenopathy consistent with known metastatic disease that is stable from prior exam.  3/18/2025:  She presents for evaluation for increasing parastomal hernia causing tearing of the skin around the ostomy.  She had intermittently passes clot from the bleeding around the ostomy.  Denies any blood from the ostomy itself.  Hernia impacts her quality of life and prevents her activities.  She continues on chemotherapy.    4/30/2025:  Colonoscopy with robotic parastomal hernia repair with sugar Baker mesh underlay  Findings:  1. Multiple adhesions up to the anterior abdominal wall.  These were carefully taken down.  All adhesiolysis was performed sharply.  Once all of the omentum and small bowel were freed off the anterior abdominal wall, the descending colon could be well visualized going into a large paracolostomy hernia.  The fascial defect was partially closed down with a #1 V lock suture that was absorbable.  A 15 x 20 cm piece of Ventralex mesh was placed as an underlay in sugar Moon technique to lateralize the colostomy.  2. Colonoscopy was performed.  Adequate bowel prep.  No lesions seen throughout the remainder of the colon.    05/13/2025:  Presents for follow-up.  Overall doing well.  Pain improving.   Ostomy size decreasing.  No issues with pouching.    6/24/25: back on chemotherapy and taken 3 cycles of chemo so far.  More fatigue and ill following chemotherapy.  She is having intermittent crampy left-sided abdominal pain that radiates down her leg.  Ostomy functioning well.  No further bleeding.    Surveillance:   9/9/2022:  Surveillance colonoscopy performed.  - Impression:            - Widely patent end colostomy with healthy                          appearing mucosa in the descending colon.                          - The examined portion of the ileum was normal.                          - The descending colon, transverse colon and                          ascending colon are normal.                          - No specimens collected.   04/30/2025: No lesions seen.    Review of Systems:  Review of Systems   Constitutional:  Negative for chills, diaphoresis, fever, malaise/fatigue and weight loss.   HENT:  Negative for congestion.    Respiratory:  Negative for shortness of breath.    Cardiovascular:  Negative for chest pain and leg swelling.   Gastrointestinal:  Negative for abdominal pain, blood in stool, constipation, diarrhea, nausea and vomiting.   Genitourinary:  Negative for dysuria.   Musculoskeletal:  Negative for back pain and myalgias.   Skin:  Negative for rash.   Neurological:  Negative for dizziness and weakness.   Endo/Heme/Allergies:  Does not bruise/bleed easily.   Psychiatric/Behavioral:  Negative for depression.        OBJECTIVE:   Telemedicine visit.  Exam from prior evaluation.    Vital Signs (Most Recent)  There were no vitals taken for this visit.    Physical Exam:  General: White female in no distress   Neuro: alert and oriented x 4.  Moves all extremities.     HEENT: no icterus.  Trachea midline  Respiratory: respirations are even and unlabored  Cardiac: tachycardic  Abdomen:  Midline incision well healed.  Ostomy in the left mid abdomen is healthy and pink and protrudes above the level  of the skin.  Surrounding skin is flat.  No hernia on abdominal straining  Extremities: Warm dry and intact  Skin: no rashes  Anorectal:  Deferred today.  From prior exam: Perineal flap incision is healing very well.  Small amount of granulation tissue treated with silver nitrate.  Posterior vaginal wall intact and healing well.    Labs:   H&H 8 and 28.  Alb 4.1.  Normal renal function  CEA down to 4.2 from 30+    Imaging:   MRI 1/8/2021: Locally invasive low rectal carcinoma with anal sphincter and vaginal involvement with moderate response to treatment.  There is similar tumor extent to the 09/29/2020 exam, but significantly decreased in size and improved signal characteristics, as above.  Development of colovaginal fistula noted.  Two suspicious perirectal lymph nodes, significantly improved.  CT C/A/P 01/08/2021:: multiple hypodense lesions in the right hepatic lobe, some of which appear new since the prior outside CT from 09/15/2020 and are potentially concerning for intrahepatic metastasis     CT abd pelvis 10/23/24 reviewed with 5 5 cm x 6.5 cm parastomal hernia.     ASSESSMENT/PLAN:     Diagnoses and all orders for this visit:    Parastomal hernia without obstruction or gangrene    History of rectal cancer                59 y.o. woman with locally advanced rectal cancer with multiple hepatic metastases.  She has received short course radiotherapy followed by FOLFOX chemotherapy.  As response to her treatment, her tumor has decreased in size and she has now developed a rectovaginal fistula.    With the RV fistula, she has stopped eating and is unable to tolerate chemotherapy.   Therefore, urgent fecal diversion was offered with a laparoscopic diverting loop sigmoid colostomy.  This was performed on 01/13/2021.  She then had 3 additional cycles of chemotherapy.    Liver resection was done on 4/6/21.  Positive margins.     Abdominal perineal resection with EN bloc posterior vaginectomy and pedicle flap  reconstruction performed on 08/16/2021 for yp T4b N0 M1c rectal cancer.     She had pulmonary metastatic disease treated by SBRT.  She now has retroperitoneal lymphadenopathy be controlled with FOLFIRI and Avastin.    She presents for evaluation for parastomal hernia causing tearing of the parastomal skin resulting in ongoing bleeding as well as pain and impacting her quality of life.  She underwent colonoscopy with robotic assisted parastomal hernia repair with mesh underlay in sugar Moon fashion on 04/30/2025.    She continues to progress.  Unclear why she is having left-sided abdominal pain that shoots down her leg.  This may be due to some of the sutures.  Sutures were all absorbable and should absorb over the next several weeks.  She will get cross-sectional imaging with Medical Oncology as part of her cancer surveillance.  I have asked for a copy of the report or the images for review.  She will continue to follow up with medical oncology for her cancer evaluation.  If she has ongoing pain in 2 months, I have asked for her to reach out and we will arrange for in person evaluation.      RAHUL Jacobson MD, FACS, FASCRS  Staff Surgeon  Colon & Rectal Surgery      CC: Cayetano Stearns MD

## 2025-07-23 ENCOUNTER — TELEPHONE (OUTPATIENT)
Dept: SURGERY | Facility: CLINIC | Age: 60
End: 2025-07-23
Payer: COMMERCIAL

## 2025-07-23 NOTE — TELEPHONE ENCOUNTER
Spoke with patient and explained that [facility] paperwork and medical claims go through HIM department. Phone number provided to HIM department to speak with representative.    Patient states that she has had bleeding in her ostomy bag, intermittently. Denies dizziness or weakness. Explained that I will pass along message to Dr. Jacobson to advise on plan.    Patient verbalizes understanding.

## 2025-07-24 ENCOUNTER — PATIENT MESSAGE (OUTPATIENT)
Dept: SURGERY | Facility: CLINIC | Age: 60
End: 2025-07-24
Payer: COMMERCIAL

## (undated) DEVICE — ELECTRODE REM PLYHSV RETURN 9

## (undated) DEVICE — DRAPE INCISE IOBAN 2 23X17IN

## (undated) DEVICE — PAD PINK TRENDELENBURG POS XL

## (undated) DEVICE — SUT 1 48IN PDS II VIO MONO

## (undated) DEVICE — SUT 0 VICRYL / UR6 (J603)

## (undated) DEVICE — Device

## (undated) DEVICE — SEALER LIGASURE IMPACT 18CM

## (undated) DEVICE — HOOK STAY ELAS 5MM 8EA/PK

## (undated) DEVICE — SKINMARKER & RULER REGULAR X-F

## (undated) DEVICE — SUT 3/0 27IN PDS II VIO MO

## (undated) DEVICE — LEGGINGS 48X31 INCH

## (undated) DEVICE — DRESSING ABSRBNT ISLAND 3.6X8

## (undated) DEVICE — SUT CTD VICRYL 3-0 VIL BR

## (undated) DEVICE — ELECTRODE EXTENDED BLADE

## (undated) DEVICE — SYS SEE SHARP SCP ANTIFG LNG

## (undated) DEVICE — COVER TIP CURVED SCISSORS XI

## (undated) DEVICE — OBTURATOR BLADELESS 8MM XI

## (undated) DEVICE — SEE MEDLINE ITEM 152487

## (undated) DEVICE — STAPLER HANDLE XL 26 CM

## (undated) DEVICE — LUBRICANT SURGILUBE 2 OZ

## (undated) DEVICE — SUT VICRYL PLUS 4-0 PS2 27

## (undated) DEVICE — SEE MEDLINE ITEM 154981

## (undated) DEVICE — DRESSING LEUKOPLAST FLEX 1X3IN

## (undated) DEVICE — SEE MEDLINE ITEM 157128

## (undated) DEVICE — TROCAR ENDOPATH XCEL 5MM 7.5CM

## (undated) DEVICE — SOL ELECTROLUBE ANTI-STIC

## (undated) DEVICE — ADHESIVE DERMABOND ADVANCED

## (undated) DEVICE — TRAY MINOR GEN SURG

## (undated) DEVICE — SUT 2/0 30IN SILK BLK BRAI

## (undated) DEVICE — SUT 2-0 VICRYL / CT-1

## (undated) DEVICE — SPONGE GAUZE 16PLY 4X4

## (undated) DEVICE — SOL CLEARIFY VISUALIZATION LAP

## (undated) DEVICE — SUT CTD VICRYL BR CR/SH VIL

## (undated) DEVICE — SUT COATED VICRYL 4/0 27IN

## (undated) DEVICE — BINDER ABDOMINAL 9 46-62

## (undated) DEVICE — SEE MEDLINE ITEM 152622

## (undated) DEVICE — BLADE SURG CARBON STEEL SZ11

## (undated) DEVICE — SCISSOR 5MMX35CM DIRECT DRIVE

## (undated) DEVICE — SUT 0 18IN COATED VICRYL V

## (undated) DEVICE — SEE MEDLINE ITEM 146347

## (undated) DEVICE — TUBING HF INSUFFLATION W/ FLTR

## (undated) DEVICE — RELOAD ECHELON ENDOPATH 45MM

## (undated) DEVICE — STAPLER SKIN ROTATING HEAD

## (undated) DEVICE — TRAY FOLEY 16FR INFECTION CONT

## (undated) DEVICE — DRESSING GAUZE XEROFORM 5X9

## (undated) DEVICE — SEE MEDLINE ITEM 157117

## (undated) DEVICE — DRAPE COLUMN DAVINCI XI

## (undated) DEVICE — LOOP VESSEL YELLOW MAXI

## (undated) DEVICE — POWDER ARISTA AH 3G

## (undated) DEVICE — SOL IRR NACL .9% 3000ML

## (undated) DEVICE — SEE MEDLINE ITEM 157148

## (undated) DEVICE — APPLICATOR CHLORAPREP ORN 26ML

## (undated) DEVICE — POUCH SENSURA MIO 3/8X2 1/8IN

## (undated) DEVICE — NDL HYPO REG 25G X 1 1/2

## (undated) DEVICE — SUT CTD VICRYL VIL BR CR/SH

## (undated) DEVICE — NDL BOX COUNTER

## (undated) DEVICE — TRAY CATH 1-LYR URIMTR 16FR

## (undated) DEVICE — SYR 10CC LUER LOCK

## (undated) DEVICE — BAG INZII TISS RETRV 12/15MM

## (undated) DEVICE — BLADE SURG #15 CARBON STEEL

## (undated) DEVICE — IRRIGATOR ENDOWRIST XI SUCTION

## (undated) DEVICE — SEE MEDLINE ITEM 152512

## (undated) DEVICE — SUT PDS II 0 CT-1 VIL MONO

## (undated) DEVICE — DRAPE ABDOMINAL TIBURON 14X11

## (undated) DEVICE — SUT SILK 2-0 PS 18IN BLACK

## (undated) DEVICE — DRAPE ARM DAVINCI XI

## (undated) DEVICE — SUT PDS II 1 TP-1 VIL

## (undated) DEVICE — SEE MEDLINE ITEM 157144

## (undated) DEVICE — SUT 0 8-18 IN CTD VICRYL

## (undated) DEVICE — SUT 5/0 27IN PDS II VIO MO

## (undated) DEVICE — RELOAD TRI-STAPLE 2.0 CRV 45MM

## (undated) DEVICE — PREP KIT 14

## (undated) DEVICE — GOWN AERO CHROME W/ TOWEL XL

## (undated) DEVICE — TRAY GENERAL SURGERY OMC

## (undated) DEVICE — GOWN SURGICAL X-LARGE

## (undated) DEVICE — SUT MCRYL PLUS 4-0 PS2 27IN

## (undated) DEVICE — SUT 1 27IN CHROMIC GUT CT-1

## (undated) DEVICE — SEALER AQUAMANTYS 8.7 ENDO

## (undated) DEVICE — SUT CHROME GUT 1 CT-2 27

## (undated) DEVICE — IRRIGATOR SUCTION W/TIP

## (undated) DEVICE — SUT VICRYL 3-0 27 SH

## (undated) DEVICE — SOL NS 1000CC

## (undated) DEVICE — CLIPPER BLADE MOD 4406 (CAREF)

## (undated) DEVICE — CANNULA REDUCER 12-8MM

## (undated) DEVICE — TROCAR ENDOPATH XCEL 5X75MM

## (undated) DEVICE — PENCIL ROCKER SWITCH 10FT CORD

## (undated) DEVICE — TIP YANKAUERS BULB NO VENT

## (undated) DEVICE — SUT VICRYL+ 27 UR-6 VIOL

## (undated) DEVICE — NDL INSUF ULTRA VERESS 120MM

## (undated) DEVICE — TAPE SILK 3IN

## (undated) DEVICE — RETRACTOR LONE STAR 14.1X14.1

## (undated) DEVICE — STAPLER ECHELON FLEX 45MM 34CM

## (undated) DEVICE — SUT CTD VICRYL VIL BR SH 27

## (undated) DEVICE — OBTURATOR BLADELESS 8MM XI CLR

## (undated) DEVICE — DRAPE STERI INSTRUMENT 1018

## (undated) DEVICE — DRESSING ABD OPEN GRANUFOAM

## (undated) DEVICE — SEE MEDLINE ITEM 156902

## (undated) DEVICE — KIT GELPORT LAPAROSCOPIC ABD

## (undated) DEVICE — GLOVE PROTEXIS NEOPRENE 7.5

## (undated) DEVICE — SYR ONLY LUER LOCK 20CC

## (undated) DEVICE — GOWN SMART IMP BREATHABLE XXLG

## (undated) DEVICE — SEAL UNIVERSAL 5MM-8MM XI

## (undated) DEVICE — SEE MEDLINE ITEM 146417

## (undated) DEVICE — TUBING SUCTION STERILE

## (undated) DEVICE — SET TRI-LUMEN FILTERED TUBE

## (undated) DEVICE — BLADE SURG CARBON STEEL #10

## (undated) DEVICE — COVER LIGHT HANDLE

## (undated) DEVICE — PORT AIRSEAL 12/120MM LPI

## (undated) DEVICE — PACK PERI/GYN OPTIMA

## (undated) DEVICE — KIT PREVENA PLUS

## (undated) DEVICE — BRIEF MESH LARGE

## (undated) DEVICE — IRRIGATOR ENDOSCOPY DISP.

## (undated) DEVICE — DRAPE SCOPE PILLOW WARMER

## (undated) DEVICE — TOWEL OR DISP STRL BLUE 4/PK

## (undated) DEVICE — SEALER VESSEL EXTEND

## (undated) DEVICE — SUT ENDOLOOP PDSII 18 LIGA

## (undated) DEVICE — ELECTRODE MEGADYNE RETURN DUAL

## (undated) DEVICE — GLOVE PROTEXIS LTX PF SURG 7.5

## (undated) DEVICE — TROCAR ENDOPATH XCEL 12MM 10CM

## (undated) DEVICE — SUT CTD VICRYL 0 VIL BR/CT

## (undated) DEVICE — NDL 20GX1-1/2IN IB

## (undated) DEVICE — BOVIE SUCTION

## (undated) DEVICE — PANTIES FEMININE NAPKIN LG/XLG

## (undated) DEVICE — COVER LIGHT HANDLE 80/CA

## (undated) DEVICE — DRESSING MEPORE ADH 3.5X12

## (undated) DEVICE — SUT 3-0 VICRYL / SH (J416)

## (undated) DEVICE — MARKER SKIN STND TIP BLUE BARR

## (undated) DEVICE — SYR 30CC LUER LOCK

## (undated) DEVICE — PASSER SUTURE DISP

## (undated) DEVICE — CUTTER PROXIMATE BLUE 75MM

## (undated) DEVICE — DEVICE CLOSURE TROCAR SITE

## (undated) DEVICE — KIT ANTIFOG

## (undated) DEVICE — SEALANT EVICEL FIBRIN HUMN 2ML

## (undated) DEVICE — NDL 22GA X1 1/2 REG BEVEL

## (undated) DEVICE — APPLIER CLIP LIAGCLIP 9.375IN

## (undated) DEVICE — SEE MEDLINE ITEM 157181

## (undated) DEVICE — SYR W/VIAL CANNULA 10CC 30

## (undated) DEVICE — APPLICATOR ARISTA FLEX XL

## (undated) DEVICE — DRAPE CORETEMP FLD WRM 56X62IN

## (undated) DEVICE — SUT MONOCRYL 3-0 PS-2 UND

## (undated) DEVICE — SPONGE LAP 18X18 PREWASHED

## (undated) DEVICE — CLIP HEMO-LOK ML

## (undated) DEVICE — PACK UNIVERSAL SPLIT II

## (undated) DEVICE — STAPLE TRI-STAPLE 2.0 SUL 60MM